# Patient Record
Sex: MALE | Race: BLACK OR AFRICAN AMERICAN
[De-identification: names, ages, dates, MRNs, and addresses within clinical notes are randomized per-mention and may not be internally consistent; named-entity substitution may affect disease eponyms.]

---

## 2017-04-24 ENCOUNTER — HOSPITAL ENCOUNTER (EMERGENCY)
Dept: HOSPITAL 62 - ER | Age: 82
Discharge: HOME | End: 2017-04-24
Payer: MEDICARE

## 2017-04-24 VITALS — DIASTOLIC BLOOD PRESSURE: 104 MMHG | SYSTOLIC BLOOD PRESSURE: 174 MMHG

## 2017-04-24 DIAGNOSIS — R03.0: ICD-10-CM

## 2017-04-24 DIAGNOSIS — J43.9: ICD-10-CM

## 2017-04-24 DIAGNOSIS — F41.9: ICD-10-CM

## 2017-04-24 DIAGNOSIS — Z86.711: ICD-10-CM

## 2017-04-24 DIAGNOSIS — Z96.642: ICD-10-CM

## 2017-04-24 DIAGNOSIS — I10: ICD-10-CM

## 2017-04-24 DIAGNOSIS — R07.9: Primary | ICD-10-CM

## 2017-04-24 DIAGNOSIS — Z87.891: ICD-10-CM

## 2017-04-24 LAB
ALBUMIN SERPL-MCNC: 4.6 G/DL (ref 3.5–5)
ALP SERPL-CCNC: 122 U/L (ref 38–126)
ALT SERPL-CCNC: 18 U/L (ref 21–72)
ANION GAP SERPL CALC-SCNC: 14 MMOL/L (ref 5–19)
AST SERPL-CCNC: 59 U/L (ref 17–59)
BASOPHILS # BLD AUTO: 0 10^3/UL (ref 0–0.2)
BASOPHILS NFR BLD AUTO: 0.3 % (ref 0–2)
BILIRUB DIRECT SERPL-MCNC: 0.8 MG/DL (ref 0–0.4)
BILIRUB SERPL-MCNC: 1.3 MG/DL (ref 0.2–1.3)
BUN SERPL-MCNC: 20 MG/DL (ref 7–20)
CALCIUM: 10 MG/DL (ref 8.4–10.2)
CHLORIDE SERPL-SCNC: 107 MMOL/L (ref 98–107)
CK MB SERPL-MCNC: 5.21 NG/ML (ref ?–4.55)
CK SERPL-CCNC: 320 U/L (ref 55–170)
CO2 SERPL-SCNC: 25 MMOL/L (ref 22–30)
CREAT SERPL-MCNC: 1.51 MG/DL (ref 0.52–1.25)
EOSINOPHIL # BLD AUTO: 0.1 10^3/UL (ref 0–0.6)
EOSINOPHIL NFR BLD AUTO: 1.2 % (ref 0–6)
ERYTHROCYTE [DISTWIDTH] IN BLOOD BY AUTOMATED COUNT: 15 % (ref 11.5–14)
GLUCOSE SERPL-MCNC: 103 MG/DL (ref 75–110)
HCT VFR BLD CALC: 43.9 % (ref 37.9–51)
HGB BLD-MCNC: 14.6 G/DL (ref 13.5–17)
HGB HCT DIFFERENCE: -0.1
LYMPHOCYTES # BLD AUTO: 1 10^3/UL (ref 0.5–4.7)
LYMPHOCYTES NFR BLD AUTO: 16.6 % (ref 13–45)
MCH RBC QN AUTO: 28.2 PG (ref 27–33.4)
MCHC RBC AUTO-ENTMCNC: 33.3 G/DL (ref 32–36)
MCV RBC AUTO: 85 FL (ref 80–97)
MONOCYTES # BLD AUTO: 0.4 10^3/UL (ref 0.1–1.4)
MONOCYTES NFR BLD AUTO: 7 % (ref 3–13)
NEUTROPHILS # BLD AUTO: 4.3 10^3/UL (ref 1.7–8.2)
NEUTS SEG NFR BLD AUTO: 74.9 % (ref 42–78)
POTASSIUM SERPL-SCNC: 4.3 MMOL/L (ref 3.6–5)
PROT SERPL-MCNC: 9.5 G/DL (ref 6.3–8.2)
RBC # BLD AUTO: 5.19 10^6/UL (ref 4.35–5.55)
SODIUM SERPL-SCNC: 146.3 MMOL/L (ref 137–145)
TROPONIN I SERPL-MCNC: < 0.012 NG/ML
WBC # BLD AUTO: 5.7 10^3/UL (ref 4–10.5)

## 2017-04-24 PROCEDURE — 99285 EMERGENCY DEPT VISIT HI MDM: CPT

## 2017-04-24 PROCEDURE — 84484 ASSAY OF TROPONIN QUANT: CPT

## 2017-04-24 PROCEDURE — 71010: CPT

## 2017-04-24 PROCEDURE — 82550 ASSAY OF CK (CPK): CPT

## 2017-04-24 PROCEDURE — 36415 COLL VENOUS BLD VENIPUNCTURE: CPT

## 2017-04-24 PROCEDURE — 71275 CT ANGIOGRAPHY CHEST: CPT

## 2017-04-24 PROCEDURE — 80053 COMPREHEN METABOLIC PANEL: CPT

## 2017-04-24 PROCEDURE — 85025 COMPLETE CBC W/AUTO DIFF WBC: CPT

## 2017-04-24 PROCEDURE — 93005 ELECTROCARDIOGRAM TRACING: CPT

## 2017-04-24 PROCEDURE — 85379 FIBRIN DEGRADATION QUANT: CPT

## 2017-04-24 PROCEDURE — 93010 ELECTROCARDIOGRAM REPORT: CPT

## 2017-04-24 PROCEDURE — 82553 CREATINE MB FRACTION: CPT

## 2017-04-24 NOTE — EKG REPORT
SEVERITY:- BORDERLINE ECG -

SINUS RHYTHM

BORDERLINE PROLONGED QT INTERVAL

:

Confirmed by: Caitlyn Sullivan 24-Apr-2017 21:39:11

## 2017-04-24 NOTE — ER DOCUMENT REPORT
ED General





- General


Chief Complaint: Chest Pain


Stated Complaint: CHEST PAIN


Mode of Arrival: Medic


Information source: Patient, Emergency Med Personnel


Notes: 


Patient presents to the emergency department via EMS for complaints of burning 

chest pain and high blood pressure.  Patient reports that around Easter his 

fingertips started going numb.  He also reported he had some chest pain felt 

more like a burn that went from his chest to his arm to his stomach.  He went 

to see his primary care provider Dr. Marian osborn and she put him on some pills. 

(naproxen per [facility] record) He reports today was his recheck.  While he was at 

the clinic today he told them he had a burning in his chest and they called 

911.  Patient reports history of high blood pressure, pulmonary embolism noted 

in the record.  Patient also reports he had a hip replacement, surgery on his 

feet and back surgery 2.  Patient denies symptoms at this time.  He denies 

chest burn. Denies SOB.  He denies fever vomiting diarrhea.  Patient has 

history of cigarette smoking but quit in 1994.  He also reports he  quit 

drinking alcohol a long time ago and has never done recreational drugs.


TRAVEL OUTSIDE OF THE [place] IN LAST 30 DAYS: No





- HPI


Onset: Other - Since Easter


Onset/Duration: Waxing and waning


Quality of pain: No pain, Burning


Associated symptoms: None


Exacerbated by: Denies


Relieved by: Denies


Similar symptoms previously: Yes


Recently seen / treated by doctor: Yes





- Related Data


Allergies/Adverse Reactions: 


 





No Known Allergies Allergy (Verified 04/24/17 13:02)


 











Past Medical History





- General


Information source: Patient





- Social History


Smoking Status: Former Smoker - quit in 94


Cigarette use (# per day): No


Frequency of alcohol use: None


Drug Abuse: None


Lives with: Alone


Family History: Reviewed & Not Pertinent


Patient has suicidal ideation: No


Patient has homicidal ideation: No





- Past Medical History


Cardiac Medical History: Reports: Hx Hypertension, Hx Pulmonary Embolism


   Denies: Hx Coronary Artery Disease, Hx Heart Attack


Pulmonary Medical History: 


   Denies: Hx Asthma, Hx Bronchitis, Hx COPD, Hx Pneumonia, Hx Tuberculosis


Neurological Medical History: Denies: Hx Cerebrovascular Accident, Hx Seizures


Endocrine Medical History: Denies: Hx Diabetes Mellitus Type 1, Hx Diabetes 

Mellitus Type 2, Hx Graves' Disease, Hx Hyperthyroidism, Hx Hypothyroidism


Renal/ Medical History: Denies: Hx Peritoneal Dialysis


GI Medical History: Denies: Hx Hepatitis


Musculoskeltal Medical History: Reports Hx Arthritis, Denies Hx Fibromyalgia, 

Denies Hx Multiple Sclerosis, Denies Hx Muscular Dystrophy, Denies Hx Muscle 

Spasm, Denies Hx Muscle Weakness, Denies Hx Musculoskeletal Deformity, Denies 

Hx Musculoskeletal Trauma


Skin Medical History: Denies Hx Cellulitis, Denies Hx Eczema, Denies Hx MRSA, 

Denies Hx Psoriasis


Psychiatric Medical History: Reports: Hx Anxiety


   Denies: Hx Attention Deficit Hyperactivity Disorder, Hx Bipolar Disorder, Hx 

Borderline Personality Disorder, Hx Depression, Hx Obsessive Compulsive Disorder

, Hx Personality Disorder, Hx Schizoaffective Disorder, Hx Schizophrenia


Traumatic Medical History: Denies: Hx Fractures, Hx Gunshot Wound, Hx Liver 

Laceration, Hx Pneumothorax, Hx Spine Fracture, Hx Spleen Laceration/Rupture, 

Hx Traumatic Brain Injury


Infectious Medical History: Denies: Hx C-Diff, Hx Hepatitis, Hx HIV, Hx MRSA, 

Hx VRE


Past Surgical History: Reports: Hx Appendectomy, Hx Orthopedic Surgery - foot; 

left hip arthroplasty.  Denies: Hx Pacemaker





- Immunizations


Immunizations up to date: Yes


Hx Diphtheria, Pertussis, Tetanus Vaccination: Yes





Review of Systems





- Review of Systems


Notes: 


Review HPI for review of systems., All other systems negative





Physical Exam





- Vital signs


Vitals: 


 











Temp Pulse Resp BP Pulse Ox


 


 95.4 F L  86   14   186/106 H  100 


 


 04/24/17 12:45  04/24/17 12:45  04/24/17 12:45  04/24/17 12:45  04/24/17 12:45














- Notes


Notes: 


PHYSICAL EXAMINATION: 


GENERAL: Well-appearing and in no acute distress nontoxic looking


HEAD: Atraumatic, normocephalic. 


EYES: Pupils round and reactive to light- unequal right smaller than left, 

extraocular movements intact, sclera anicteric, conjunctiva are normal. 


ENT: nares patent, oropharynx clear without exudates. Moist mucous membranes. 


NECK: Normal range of motion, supple without lymphadenopathy 


LUNGS: CTAB and equal. No wheezes rales or rhonchi. no SOB, Speaks in clear 

sentences 


HEART: Regular rate and rhythm without murmurs EKG SR


BACK: Denies pain


ABDOMEN: Soft, no tenderness. No guarding, no rebound 


EXTREMITIES: Normal range of motion, no pitting edema. No cyanosis. no weakness


NEUROLOGICAL: Cranial nerves grossly intact. Normal sensory/motor exams. 


PSYCH: Normal mood, normal affect. alert and oriented, no confusion


SKIN: Warm, Dry, normal turgor, no rashes or lesions noted








- HEENT


Head: Normocephalic


Eyes: Normal


-: right: Pupils uneven - right 2-1 mm, left 3-2





- Neurological


Neuro grossly intact: Yes


Cognition: Normal


Orientation: AAOx4


Maliha Coma Scale Eye Opening: Spontaneous


Linden Coma Scale Verbal: Oriented


Linden Coma Scale Motor: Obeys Commands


Maliha Coma Scale Total: 15


Speech: Normal


Cranial nerves: Normal


Motor strength normal: LUE, RUE


Additional motor exam normals: Equal 





Course





- Re-evaluation


Re-evalutation: 


04/24/17 14:50


consulted dr abercrombie who agree's with cta. pt updated on cta.





04/24/17 17:13


CTA notes emphysema. ,  CKMB 5.21 troponin neg. patient with extremely 

high blood pressure.  Discussed this with the patient and he reports he only 

took one blood pressure medication today.  He couldn't find the other 

medication. I contacted [facility] and pharmacy reports 

patient is out of his Norvasc 10 mg.  Norvasc ordered.


 


04/24/17 17:57


Second troponin negative, pt reports he feels great, he has denied cp his 

entire visit, reports he had a burn in his shoulder, chest and abdomen that 

comes and goes. Reports hes had it since Easter. Alvaro nausea vomiting 

diaphoresis shortness of breath.  Patient's blood pressure still elevated.  He 

was instructed on big value having his Norvasc medication now.  He was 

instructed on the importance of taking those medications.  Patient reports he 

lives alone but he has family in the area.  He mentions of [place] and lots of 

sisters.  He reports they drove him here.  Consulted dr mosley, labs, CTA, 

pt history, he agrees patient may be discharged





- Vital Signs


Vital signs: 


 











Temp Pulse Resp BP Pulse Ox


 


 98.2 F   86   15   174/104 H  100 


 


 04/24/17 18:09  04/24/17 12:45  04/24/17 18:09  04/24/17 18:09  04/24/17 18:09














- Laboratory


Result Diagrams: 


 04/24/17 13:35





 04/24/17 13:35


Laboratory results interpreted by me: 


 











  04/24/17 04/24/17 04/24/17





  13:35 13:35 13:35


 


RDW  15.0 H  


 


D-Dimer   


 


Sodium   146.3 H 


 


Creatinine   1.51 H 


 


Est GFR ( Amer)   54 L 


 


Est GFR (Non-Af Amer)   45 L 


 


Direct Bilirubin   0.8 H 


 


ALT   18 L 


 


Creatine Kinase   320 H 


 


CK-MB (CK-2)    5.21 H


 


Total Protein   9.5 H 














  04/24/17





  13:35


 


RDW 


 


D-Dimer  1.54 H


 


Sodium 


 


Creatinine 


 


Est GFR ( Amer) 


 


Est GFR (Non-Af Amer) 


 


Direct Bilirubin 


 


ALT 


 


Creatine Kinase 


 


CK-MB (CK-2) 


 


Total Protein 














- Diagnostic Test


Radiology reviewed: Image reviewed, Reports reviewed - ORDER #: [id number] RAD/

CHEST SINGLE VIEW  IMPRESSION: NO ACUTE RADIOGRAPHIC FINDING IN THE CHEST      

         Diagnostic report text    EXAM DESCRIPTION: CTA CHEST   COMPLETED DATE/

TIME: 4/24/2017 3:57 pm   REASON FOR STUDY: 17-chest pain, hx pe   COMPARISON: 

None.   TECHNIQUE: CT scan of the chest performed using helical scanning 

technique with dynamic  intravenous contrast injection. Images reviewed with 

lung, soft tissue and bone windows.  Reconstructed coronal and sagittal MPR 

images reviewed.  Additional 3 dimensional post-processing performed to develop 

Maximal Intensity Projection images ( MIP). All images stored on PACS.  All CT 

scanners at this facility use dose modulation, iterative reconstruction, and/or 

weight  based dosing when appropriate to reduce radiation dose to as low as 

reasonably achievable (ALARA).  [facility]: Dose Right [facility]: CareDose [facility]: Dose Right 

[facility]: Teradose 4D [facility]: [employer]   CONTRAST TYPE AND DOSE: 65 mL Isovue 

370- low osmolar.   RENAL FUNCTION: Creatinine 1.5   RADIATION DOSE: 42.56 mGy.

   LIMITATIONS: None.   FINDINGS: LUNGS AND PLEURA: There are moderate 

centrilobular emphysematous changes.  AORTA AND GREAT VESSELS: No aneurysm or 

dissection.  HEART: No pericardial effusion.  PULMONARY ARTERIES: No emboli 

visualized in the main pulmonary arteries or the segmental branches.  HILAR AND 

MEDIASTINAL STRUCTURES: No identified masses or abnormal nodes.  HARDWARE: None 

in the chest.  UPPER ABDOMEN: No significant findings. Limited exam.  THYROID 

AND OTHER SOFT TISSUES: No masses. No adenopathy.  BONES: No acute or 

significant finding.  3D MIPS: Confirm above findings.  OTHER: No other 

significant finding.   TECHNICAL DOCUMENTATION: JOB ID: [id number]  Quality ID # 

436: Final reports with documentation of one or more dose reduction techniques (

e.g.,  Automated exposure control, adjustment of the mA and/or kV according to 

patient size, use of  iterative reconstruction technique)   2011 [employer]- All Rights Reserved     ORDER #: [id number] CT/CTA CHEST  

IMPRESSION: 1. There is no evidence of pulmonary embolus.  2. There is moderate 

pulmonary emphysema





Discharge





- Discharge


Clinical Impression: 


 Burning chest pain, Elevated blood pressure reading





Pulmonary emphysema


Qualifiers:


 Emphysema type: unspecified Qualified Code(s): J43.9 - Emphysema, unspecified





Condition: Stable


Disposition: HOME, SELF-CARE


Instructions:  Aspirin (Cardiac) ([facility])


Additional Instructions: 


*You have been evaluated for high blood pressure, burn in chest, shoulder, 

abdomen.


*Take your  medication as prescribed-  your norvasc tomorrow from the 

[facility] pharmacy


*Follow up with  Marian Osborn within 3 days for recheck


*Return to ED for worsening condition, changes, needs


*Return to ED if not better in 24 hours





Forms:  Elevated Blood Pressure


Referrals: 


MARIAN OSBORN PA-C [Primary Care Provider] - Follow up as needed

## 2017-05-10 ENCOUNTER — HOSPITAL ENCOUNTER (OUTPATIENT)
Dept: HOSPITAL 62 - OD | Age: 82
End: 2017-05-10
Attending: PHYSICIAN ASSISTANT
Payer: MEDICARE

## 2017-05-10 DIAGNOSIS — M54.2: Primary | ICD-10-CM

## 2017-05-10 PROCEDURE — 72050 X-RAY EXAM NECK SPINE 4/5VWS: CPT

## 2017-05-27 ENCOUNTER — HOSPITAL ENCOUNTER (EMERGENCY)
Dept: HOSPITAL 62 - ER | Age: 82
Discharge: HOME | End: 2017-05-27
Payer: MEDICARE

## 2017-05-27 VITALS — SYSTOLIC BLOOD PRESSURE: 164 MMHG | DIASTOLIC BLOOD PRESSURE: 91 MMHG

## 2017-05-27 DIAGNOSIS — R53.1: ICD-10-CM

## 2017-05-27 DIAGNOSIS — I10: ICD-10-CM
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
## 2017-05-27 DIAGNOSIS — M54.2: ICD-10-CM

## 2017-05-27 DIAGNOSIS — R27.0: Primary | ICD-10-CM

## 2017-05-27 LAB
ALBUMIN SERPL-MCNC: 4.1 G/DL (ref 3.5–5)
ALP SERPL-CCNC: 88 U/L (ref 38–126)
ALT SERPL-CCNC: 21 U/L (ref 21–72)
ANION GAP SERPL CALC-SCNC: 10 MMOL/L (ref 5–19)
APPEARANCE UR: CLEAR
AST SERPL-CCNC: 36 U/L (ref 17–59)
BASOPHILS # BLD AUTO: 0 10^3/UL (ref 0–0.2)
BASOPHILS NFR BLD AUTO: 0.4 % (ref 0–2)
BILIRUB DIRECT SERPL-MCNC: 0.7 MG/DL (ref 0–0.4)
BILIRUB SERPL-MCNC: 1.1 MG/DL (ref 0.2–1.3)
BILIRUB UR QL STRIP: NEGATIVE
BUN SERPL-MCNC: 25 MG/DL (ref 7–20)
CALCIUM: 9.7 MG/DL (ref 8.4–10.2)
CHLORIDE SERPL-SCNC: 105 MMOL/L (ref 98–107)
CK MB SERPL-MCNC: 1.74 NG/ML (ref ?–4.55)
CK SERPL-CCNC: 116 U/L (ref 55–170)
CO2 SERPL-SCNC: 26 MMOL/L (ref 22–30)
CREAT SERPL-MCNC: 1.53 MG/DL (ref 0.52–1.25)
EOSINOPHIL # BLD AUTO: 0.1 10^3/UL (ref 0–0.6)
EOSINOPHIL NFR BLD AUTO: 1.9 % (ref 0–6)
ERYTHROCYTE [DISTWIDTH] IN BLOOD BY AUTOMATED COUNT: 14.7 % (ref 11.5–14)
GLUCOSE SERPL-MCNC: 95 MG/DL (ref 75–110)
GLUCOSE UR STRIP-MCNC: NEGATIVE MG/DL
HCT VFR BLD CALC: 42.1 % (ref 37.9–51)
HGB BLD-MCNC: 13.6 G/DL (ref 13.5–17)
HGB HCT DIFFERENCE: -1.3
KETONES UR STRIP-MCNC: NEGATIVE MG/DL
LYMPHOCYTES # BLD AUTO: 1.2 10^3/UL (ref 0.5–4.7)
LYMPHOCYTES NFR BLD AUTO: 20.6 % (ref 13–45)
MCH RBC QN AUTO: 27.9 PG (ref 27–33.4)
MCHC RBC AUTO-ENTMCNC: 32.3 G/DL (ref 32–36)
MCV RBC AUTO: 86 FL (ref 80–97)
MONOCYTES # BLD AUTO: 0.7 10^3/UL (ref 0.1–1.4)
MONOCYTES NFR BLD AUTO: 11.5 % (ref 3–13)
NEUTROPHILS # BLD AUTO: 3.9 10^3/UL (ref 1.7–8.2)
NEUTS SEG NFR BLD AUTO: 65.6 % (ref 42–78)
NITRITE UR QL STRIP: NEGATIVE
PH UR STRIP: 5 [PH] (ref 5–9)
POTASSIUM SERPL-SCNC: 3.9 MMOL/L (ref 3.6–5)
PROT SERPL-MCNC: 8.7 G/DL (ref 6.3–8.2)
PROT UR STRIP-MCNC: NEGATIVE MG/DL
RBC # BLD AUTO: 4.89 10^6/UL (ref 4.35–5.55)
SODIUM SERPL-SCNC: 141.3 MMOL/L (ref 137–145)
SP GR UR STRIP: 1.02
TROPONIN I SERPL-MCNC: < 0.012 NG/ML
UROBILINOGEN UR-MCNC: NEGATIVE MG/DL (ref ?–2)
WBC # BLD AUTO: 5.9 10^3/UL (ref 4–10.5)

## 2017-05-27 PROCEDURE — 82553 CREATINE MB FRACTION: CPT

## 2017-05-27 PROCEDURE — 99285 EMERGENCY DEPT VISIT HI MDM: CPT

## 2017-05-27 PROCEDURE — 71010: CPT

## 2017-05-27 PROCEDURE — 81001 URINALYSIS AUTO W/SCOPE: CPT

## 2017-05-27 PROCEDURE — 36415 COLL VENOUS BLD VENIPUNCTURE: CPT

## 2017-05-27 PROCEDURE — 82550 ASSAY OF CK (CPK): CPT

## 2017-05-27 PROCEDURE — 93005 ELECTROCARDIOGRAM TRACING: CPT

## 2017-05-27 PROCEDURE — 70450 CT HEAD/BRAIN W/O DYE: CPT

## 2017-05-27 PROCEDURE — 80053 COMPREHEN METABOLIC PANEL: CPT

## 2017-05-27 PROCEDURE — 93010 ELECTROCARDIOGRAM REPORT: CPT

## 2017-05-27 PROCEDURE — 72125 CT NECK SPINE W/O DYE: CPT

## 2017-05-27 PROCEDURE — 85025 COMPLETE CBC W/AUTO DIFF WBC: CPT

## 2017-05-27 PROCEDURE — 84484 ASSAY OF TROPONIN QUANT: CPT

## 2017-05-27 NOTE — RADIOLOGY REPORT (SQ)
EXAM DESCRIPTION:  CT LT LOWER EXTREMITY WITHOUT



COMPLETED DATE/TIME:  5/27/2017 6:25 am



REASON FOR STUDY:  left femur, trauma



COMPARISON:  Plain films dated 6/27/2017



TECHNIQUE:  Axial imaging performed through the left femur with reformatted coronal and sagittal imag
ing windowed for bone and soft tissues.

Images saved to PACS.

3D IMAGING: Were 3D images as MIP, SSD, or volume rendering performed at the work station? Yes

All CT scanners at this facility use dose modulation, iterative reconstruction, and/or weight based d
osing when appropriate to reduce radiation dose to as low as reasonably achievable (ALARA).

[facility]: Dose Right  [facility]: CareDose    [facility]: Dose Right    [facility]: Teradose 4D    [facility]: [employer]



LIMITATIONS:  Study is limited somewhat due to artifact related to the left hip prosthesis



RADIATION DOSE:  4.12 mGy.



FINDINGS:  SOFT TISSUES: No obvious swelling or foreign body.

BONES: No acute fracture.  No dislocation.

MINERALIZATION: Normal.

OTHER: Left hip prosthesis is identified well seated in the acetabulum and proximal femoral medullary
 canal.



IMPRESSION:  NO ACUTE OR SIGNIFICANT FINDING.



TECHNICAL DOCUMENTATION:  JOB ID:  [id number]

Quality ID # 436: Final reports with documentation of one or more dose reduction techniques (e.g., Au
tomated exposure control, adjustment of the mA and/or kV according to patient size, use of iterative 
reconstruction technique)

 2011 [employer]- All Rights Reserved

## 2017-05-27 NOTE — RADIOLOGY REPORT (SQ)
EXAM DESCRIPTION:  CHEST SINGLE VIEW



COMPLETED DATE/TIME:  5/27/2017 4:08 am



REASON FOR STUDY:  trauma, weakness



COMPARISON:  9/26/2015



EXAM PARAMETERS:  NUMBER OF VIEWS: One view.

TECHNIQUE: Single frontal radiographic view of the chest acquired.

RADIATION DOSE: NA

LIMITATIONS: None.



FINDINGS:  LUNGS AND PLEURA: No opacities, masses or pneumothorax. No pleural effusion.

MEDIASTINUM AND HILAR STRUCTURES: No masses.  Contour normal.

HEART AND VASCULAR STRUCTURES: Heart normal in size.  Normal vasculature.

BONES: No acute findings.

HARDWARE: None in the chest.

OTHER: No other significant finding.



IMPRESSION:  NO ACUTE RADIOGRAPHIC FINDING IN THE CHEST.



TECHNICAL DOCUMENTATION:  JOB ID:  [id number]

## 2017-05-27 NOTE — RADIOLOGY REPORT (SQ)
EXAM DESCRIPTION:  HIP LEFT AP/LATERAL



COMPLETED DATE/TIME:  5/27/2017 4:08 am



REASON FOR STUDY:  trauma



COMPARISON:  1/19/2015



NUMBER OF VIEWS:  Two views.



TECHNIQUE:  AP pelvis and additional frog-leg view of the left hip.



LIMITATIONS:  None.



FINDINGS:  MINERALIZATION: Normal.

LEFT HIP: Status post left total hip arthroplasty.  There is no hardware fracture, perihardware lucen
cy or migration evident.

RIGHT HIP: No fracture or dislocation.  No worrisome bone lesions.  Femoral acetabular degenerative c
hanges

PUBIS AND ISCHIUM: No fracture.

PELVIS: No fracture.

SACRUM: No fracture or dislocation. No worrisome bone lesions.

LOWER LUMBAR SPINE: No fracture or dislocation. No worrisome bone lesions.  No significant disc disea
se.

SOFT TISSUES: No findings.

OTHER: On the frog-leg projection only, there is a linear opacity along the medial femoral cortex ; t
his is of uncertain etiology or significance.



IMPRESSION:  Status post left total hip arthroplasty without evidence of hardware complication.  On t
he frog-leg projection only, there is of uncertain linear opacity.  Given history of trauma cannot ex
clude osseous injury.  Recommend correlation with physical exam and mechanism of injury.  Consider re
peat left femoral radiographs.



TECHNICAL DOCUMENTATION:  JOB ID:  [id number]

 2011 [employer]- All Rights Reserved

## 2017-05-27 NOTE — RADIOLOGY REPORT (SQ)
EXAM DESCRIPTION:  CT CERVICAL SPINE WITHOUT



COMPLETED DATE/TIME:  5/27/2017 7:06 am



REASON FOR STUDY:  neck pain



COMPARISON:  None.



TECHNIQUE:  Axial images acquired through the cervical spine without intravenous contrast.  Images re
viewed with lung, soft tissue and bone windows.  Reconstructed coronal and sagittal MPR images review
ed.  Images stored on PACS.

All CT scanners at this facility use dose modulation, iterative reconstruction, and/or weight based d
osing when appropriate to reduce radiation dose to as low as reasonably achievable (ALARA).

[facility]: Dose Right  [facility]: CareDose    [facility]: Dose Right    [facility]: Teradose 4D    [facility]: [employer]



RADIATION DOSE:  17.07 mGy.



LIMITATIONS:  None.



FINDINGS:  ALIGNMENT: Anatomic.

MINERALIZATION: Normal.

VERTEBRAL BODIES: No fractures or dislocation.

DISCS: Multilevel disc space narrowing with osteophytes.

FACETS, LATERAL MASSES, POSTERIOR ELEMENTS: Facet arthropathy.  No fractures.  No dislocation.  No ac
[place] findings.

HARDWARE: None in the spine.

VISUALIZED RIBS: No fractures.

LUNG APICES AND SOFT TISSUES: No significant or acute findings.

OTHER: No other significant finding.



IMPRESSION:  CHRONIC DEGENERATIVE CHANGES.  NO ACUTE FINDINGS.



TECHNICAL DOCUMENTATION:  JOB ID:  [id number]

Quality ID # 436: Final reports with documentation of one or more dose reduction techniques (e.g., Au
tomated exposure control, adjustment of the mA and/or kV according to patient size, use of iterative 
reconstruction technique)

 2011 [employer]- All Rights Reserved

## 2017-05-27 NOTE — RADIOLOGY REPORT (SQ)
EXAM DESCRIPTION:  CT HEAD WITHOUT



COMPLETED DATE/TIME:  5/27/2017 3:52 am



REASON FOR STUDY:  trauma, weakness



COMPARISON:  None.



TECHNIQUE:  Axial images acquired through the brain without intravenous contrast.  Images reviewed wi
th bone, brain and subdural windows.  Images stored on PACS.

All CT scanners at this facility use dose modulation, iterative reconstruction, and/or weight based d
osing when appropriate to reduce radiation dose to as low as reasonably achievable (ALARA).

[facility]: Dose Right  [facility]: CareDose    [facility]: Dose Right    [facility]: Teradose 4D    [facility]: [employer]



RADIATION DOSE:  64.61 mGy.



LIMITATIONS:  None.



FINDINGS:  VENTRICLES: Mildly prominent.

CEREBRUM: No masses.  No hemorrhage.  No midline shift.  Areas of low density in the white matter mos
t likely due to chronic micro-vascular ischemic change.  No evidence for acute infarction.

CEREBELLUM: No masses.  No hemorrhage.  No alteration of density.  No evidence for acute infarction.

EXTRAAXIAL SPACES: Mild age-related involutional change.  No fluid collections.  No masses.

ORBITS AND GLOBE: No intra- or extraconal masses.  Normal contour of globe without masses.

CALVARIUM: No fracture.

PARANASAL SINUSES: No fluid or mucosal thickening.

SOFT TISSUES: No mass or hematoma.  Incidental note is made of atherosclerotic vascular calcification
s within the cavernous segments of the internal carotid arteries.

OTHER: No other significant finding.



IMPRESSION:  MILD CHRONIC CHANGES OF ATROPHY AND MICROVASCULAR ISCHEMIA.  NO ACUTE PROCESS.



TECHNICAL DOCUMENTATION:  JOB ID:  [id number]

Quality ID # 436: Final reports with documentation of one or more dose reduction techniques (e.g., Au
tomated exposure control, adjustment of the mA and/or kV according to patient size, use of iterative 
reconstruction technique)

 2011 [employer]- All Rights Reserved

## 2017-05-27 NOTE — EKG REPORT
SEVERITY:- ABNORMAL ECG -

SINUS RHYTHM

NONSPECIFIC LATERAL ST-T CHANGES

:

Confirmed by: Collins Morales MD 27-May-2017 08:23:01

## 2017-06-04 ENCOUNTER — HOSPITAL ENCOUNTER (EMERGENCY)
Dept: HOSPITAL 62 - ER | Age: 82
Discharge: HOME | End: 2017-06-04
Payer: MEDICARE

## 2017-06-04 VITALS — DIASTOLIC BLOOD PRESSURE: 73 MMHG | SYSTOLIC BLOOD PRESSURE: 128 MMHG

## 2017-06-04 DIAGNOSIS — R20.0: ICD-10-CM

## 2017-06-04 DIAGNOSIS — Z86.711: ICD-10-CM

## 2017-06-04 DIAGNOSIS — I10: ICD-10-CM

## 2017-06-04 DIAGNOSIS — R53.1: Primary | ICD-10-CM

## 2017-06-04 DIAGNOSIS — Z87.891: ICD-10-CM

## 2017-06-04 LAB
ANION GAP SERPL CALC-SCNC: 10 MMOL/L (ref 5–19)
APPEARANCE UR: CLEAR
BASOPHILS # BLD AUTO: 0 10^3/UL (ref 0–0.2)
BASOPHILS NFR BLD AUTO: 0.6 % (ref 0–2)
BILIRUB UR QL STRIP: NEGATIVE
BUN SERPL-MCNC: 20 MG/DL (ref 7–20)
CALCIUM: 9.6 MG/DL (ref 8.4–10.2)
CHLORIDE SERPL-SCNC: 107 MMOL/L (ref 98–107)
CO2 SERPL-SCNC: 26 MMOL/L (ref 22–30)
CREAT SERPL-MCNC: 1.42 MG/DL (ref 0.52–1.25)
EOSINOPHIL # BLD AUTO: 0.1 10^3/UL (ref 0–0.6)
EOSINOPHIL NFR BLD AUTO: 1.4 % (ref 0–6)
ERYTHROCYTE [DISTWIDTH] IN BLOOD BY AUTOMATED COUNT: 14.4 % (ref 11.5–14)
GLUCOSE SERPL-MCNC: 97 MG/DL (ref 75–110)
GLUCOSE UR STRIP-MCNC: NEGATIVE MG/DL
HCT VFR BLD CALC: 38.8 % (ref 37.9–51)
HGB BLD-MCNC: 12.7 G/DL (ref 13.5–17)
HGB HCT DIFFERENCE: -0.7
KETONES UR STRIP-MCNC: NEGATIVE MG/DL
LIPASE SERPL-CCNC: 172.8 U/L (ref 23–300)
LYMPHOCYTES # BLD AUTO: 0.7 10^3/UL (ref 0.5–4.7)
LYMPHOCYTES NFR BLD AUTO: 10.5 % (ref 13–45)
MAGNESIUM SERPL-MCNC: 1.9 MG/DL (ref 1.6–2.3)
MCH RBC QN AUTO: 28.2 PG (ref 27–33.4)
MCHC RBC AUTO-ENTMCNC: 32.8 G/DL (ref 32–36)
MCV RBC AUTO: 86 FL (ref 80–97)
MONOCYTES # BLD AUTO: 0.4 10^3/UL (ref 0.1–1.4)
MONOCYTES NFR BLD AUTO: 6.3 % (ref 3–13)
NEUTROPHILS # BLD AUTO: 5.1 10^3/UL (ref 1.7–8.2)
NEUTS SEG NFR BLD AUTO: 81.2 % (ref 42–78)
NITRITE UR QL STRIP: NEGATIVE
PH UR STRIP: 6 [PH] (ref 5–9)
POTASSIUM SERPL-SCNC: 4 MMOL/L (ref 3.6–5)
PROT UR STRIP-MCNC: 30 MG/DL
RBC # BLD AUTO: 4.51 10^6/UL (ref 4.35–5.55)
SODIUM SERPL-SCNC: 143.2 MMOL/L (ref 137–145)
SP GR UR STRIP: 1.01
UROBILINOGEN UR-MCNC: NEGATIVE MG/DL (ref ?–2)
WBC # BLD AUTO: 6.3 10^3/UL (ref 4–10.5)

## 2017-06-04 PROCEDURE — 99285 EMERGENCY DEPT VISIT HI MDM: CPT

## 2017-06-04 PROCEDURE — 85025 COMPLETE CBC W/AUTO DIFF WBC: CPT

## 2017-06-04 PROCEDURE — 84484 ASSAY OF TROPONIN QUANT: CPT

## 2017-06-04 PROCEDURE — 83690 ASSAY OF LIPASE: CPT

## 2017-06-04 PROCEDURE — 81001 URINALYSIS AUTO W/SCOPE: CPT

## 2017-06-04 PROCEDURE — 80048 BASIC METABOLIC PNL TOTAL CA: CPT

## 2017-06-04 PROCEDURE — 70450 CT HEAD/BRAIN W/O DYE: CPT

## 2017-06-04 PROCEDURE — 93010 ELECTROCARDIOGRAM REPORT: CPT

## 2017-06-04 PROCEDURE — 71020: CPT

## 2017-06-04 PROCEDURE — 93005 ELECTROCARDIOGRAM TRACING: CPT

## 2017-06-04 PROCEDURE — 36415 COLL VENOUS BLD VENIPUNCTURE: CPT

## 2017-06-04 PROCEDURE — 83735 ASSAY OF MAGNESIUM: CPT

## 2017-06-04 NOTE — RADIOLOGY REPORT (SQ)
EXAM DESCRIPTION:  CHEST PA/LAT



COMPLETED DATE/TIME:  6/4/2017 8:52 am



REASON FOR STUDY:  WEAKNESS



COMPARISON:  CT angio chest 4/24/2017

AP chest 4/24/2017, 5/27/2017



EXAM PARAMETERS:  NUMBER OF VIEWS: two views

TECHNIQUE: Digital Frontal and Lateral radiographic views of the chest acquired.

RADIATION DOSE: NA

LIMITATIONS: none



FINDINGS:  LUNGS AND PLEURA: No opacities, masses or pneumothorax. No pleural effusion.

MEDIASTINUM AND HILAR STRUCTURES: No masses or contour abnormalities.

HEART AND VASCULAR STRUCTURES: Heart normal size.  No evidence for failure.

BONES: No acute findings.

HARDWARE: None in the chest.

OTHER: No other significant finding.



IMPRESSION:  NO SIGNIFICANT RADIOGRAPHIC FINDING IN THE CHEST.



TECHNICAL DOCUMENTATION:  JOB ID:  [id number]

 2011 [employer]- All Rights Reserved

## 2017-06-04 NOTE — ER DOCUMENT REPORT
ED General





- General


Chief Complaint: General Weakness


Stated Complaint: FALL/WEAKNESS


Time Seen by Provider: 06/04/17 07:43


TRAVEL OUTSIDE OF THE [place] IN LAST 30 DAYS: No





- HPI


Patient complains to provider of: Generalized weakness


Notes: 


Patient is coming in for generalized weakness.  Patient states is an ongoing 

for months and progressively getting worse.  Patient states diffuse numbness in 

hands and extremities is intermittent.  Patient denies fever chills nausea 

vomiting denies any chest pain or abdominal pain.  Patient has generalized 

weakness or unilateral weakness.  Patient has not followed up with his primary 

care physician





- Related Data


Allergies/Adverse Reactions: 


 





No Known Allergies Allergy (Verified 05/27/17 03:31)


 











Past Medical History





- Social History


Smoking Status: Former Smoker


Chew tobacco use (# tins/day): No


Frequency of alcohol use: None


Drug Abuse: None


Family History: Reviewed & Not Pertinent


Patient has suicidal ideation: No


Patient has homicidal ideation: No





- Past Medical History


Cardiac Medical History: Reports: Hx Hypertension, Hx Pulmonary Embolism


   Denies: Hx Coronary Artery Disease, Hx Heart Attack


Pulmonary Medical History: 


   Denies: Hx Asthma, Hx Bronchitis, Hx COPD, Hx Pneumonia, Hx Tuberculosis


Neurological Medical History: Denies: Hx Cerebrovascular Accident, Hx Seizures


Endocrine Medical History: Denies: Hx Diabetes Mellitus Type 1, Hx Diabetes 

Mellitus Type 2, Hx Graves' Disease, Hx Hyperthyroidism, Hx Hypothyroidism


Renal/ Medical History: Denies: Hx Peritoneal Dialysis


GI Medical History: Denies: Hx Hepatitis


Musculoskeltal Medical History: Reports Hx Arthritis, Denies Hx Fibromyalgia, 

Denies Hx Multiple Sclerosis, Denies Hx Muscular Dystrophy, Denies Hx Muscle 

Spasm, Denies Hx Muscle Weakness, Denies Hx Musculoskeletal Deformity, Denies 

Hx Musculoskeletal Trauma


Skin Medical History: Denies Hx Cellulitis, Denies Hx Eczema, Denies Hx MRSA, 

Denies Hx Psoriasis


Psychiatric Medical History: Reports: Hx Anxiety


   Denies: Hx Attention Deficit Hyperactivity Disorder, Hx Bipolar Disorder, Hx 

Borderline Personality Disorder, Hx Depression, Hx Obsessive Compulsive Disorder

, Hx Personality Disorder, Hx Schizoaffective Disorder, Hx Schizophrenia


Traumatic Medical History: Denies: Hx Fractures, Hx Gunshot Wound, Hx Liver 

Laceration, Hx Pneumothorax, Hx Spine Fracture, Hx Spleen Laceration/Rupture, 

Hx Traumatic Brain Injury


Infectious Medical History: Denies: Hx C-Diff, Hx Hepatitis, Hx HIV, Hx MRSA, 

Hx VRE


Past Surgical History: Reports: Hx Appendectomy, Hx Orthopedic Surgery - foot; 

left hip arthroplasty.  Denies: Hx Pacemaker





- Immunizations


Immunizations up to date: Yes


Hx Diphtheria, Pertussis, Tetanus Vaccination: Yes





Review of Systems





- Review of Systems


Constitutional: Weakness


EENT: No symptoms reported


Cardiovascular: No symptoms reported


Respiratory: No symptoms reported


Gastrointestinal: No symptoms reported


Genitourinary: No symptoms reported


Male Genitourinary: No symptoms reported


Musculoskeletal: No symptoms reported


Skin: No symptoms reported


Hematologic/Lymphatic: No symptoms reported


Neurological/Psychological: No symptoms reported


-: Yes All other systems reviewed and negative





Physical Exam





- Vital signs


Vitals: 


 











Temp Pulse Resp BP Pulse Ox


 


 97.4 F   89   20   148/78 H  98 


 


 06/04/17 08:01  06/04/17 08:01  06/04/17 08:01  06/04/17 08:01  06/04/17 08:01











Interpretation: Normal





- General


General appearance: Appears well, Alert





- HEENT


Head: Normocephalic, Atraumatic


Eyes: Normal


Pupils: PERRL





- Respiratory


Respiratory status: No respiratory distress


Chest status: Nontender


Breath sounds: Normal


Chest palpation: Normal





- Cardiovascular


Rhythm: Regular


Heart sounds: Normal auscultation


Murmur: No





- Abdominal


Inspection: Normal


Distension: No distension


Bowel sounds: Normal


Tenderness: Nontender


Organomegaly: No organomegaly





- Back


Back: Normal, Nontender





- Extremities


General upper extremity: Normal inspection, Nontender, Normal color, Normal ROM

, Normal temperature


General lower extremity: Normal inspection, Nontender, Normal color, Normal ROM

, Normal temperature, Normal weight bearing.  No: Madison's sign





- Neurological


Neuro grossly intact: Yes


Cognition: Normal


Orientation: AAOx4


[place] Coma Scale Eye Opening: Spontaneous


[place] Coma Scale Verbal: Oriented


[place] Coma Scale Motor: Obeys Commands


[place] Coma Scale Total: 15


Speech: Normal


Motor strength normal: LUE, RUE, LLE, RLE


Sensory: Normal





- Psychological


Associated symptoms: Normal affect, Normal mood





- Skin


Skin Temperature: Warm


Skin Moisture: Dry


Skin Color: Normal





Course





- Re-evaluation


Re-evalutation: 





06/04/17 14:53


Critical radiology and laboratory studies.  Patient was encouraged follow-up 

primary care physician.  Patient will have a consult for  to 

evaluate the patient to assess for needs





- Vital Signs


Vital signs: 


 











Temp Pulse Resp BP Pulse Ox


 


 97.7 F   83   16   128/73 H  100 


 


 06/04/17 10:46  06/04/17 10:46  06/04/17 10:46  06/04/17 10:46  06/04/17 10:46














- Laboratory


Result Diagrams: 


 06/04/17 09:45





 06/04/17 09:45


Laboratory results interpreted by me: 


 











  06/04/17 06/04/17 06/04/17





  09:45 09:45 10:03


 


Hgb  12.7 L  


 


RDW  14.4 H  


 


Seg Neutrophils %  81.2 H  


 


Lymphocytes %  10.5 L  


 


Creatinine   1.42 H 


 


Est GFR ( Amer)   58 L 


 


Est GFR (Non-Af Amer)   48 L 


 


Urine Protein    30 H














Discharge





- Discharge


Clinical Impression: 


 Generalized weakness





Condition: Good


Disposition: HOME, SELF-CARE


Instructions:  Weakness ([facility])


Additional Instructions: 


Your evaluation today reveals no critical pathology and there is no signs of 

stroke heart attack infection.  We will give her information to our social 

worker who will call you tomorrow to assess your living situation please make 

sure the contact information is up-to-date.


Referrals: 


MARIAN PORRAS PA-C [Primary Care Provider] - Follow up in 3-5 days

## 2017-06-04 NOTE — RADIOLOGY REPORT (SQ)
EXAM DESCRIPTION:  CT HEAD WITHOUT



COMPLETED DATE/TIME:  6/4/2017 8:40 am



REASON FOR STUDY:  WEAKNESS



COMPARISON:  CT brain 5/27/2017, 10/24/2012, 8/19/2012



TECHNIQUE:  Axial images acquired through the brain without intravenous contrast.  Images reviewed wi
th bone, brain and subdural windows.  Images stored on PACS.

All CT scanners at this facility use dose modulation, iterative reconstruction, and/or weight based d
osing when appropriate to reduce radiation dose to as low as reasonably achievable (ALARA).

[facility]: Dose Right  [facility]: CareDose    [facility]: Dose Right    [facility]: Teradose 4D    [facility]: Smart [employer]



RADIATION DOSE:  64.61 mGy.



LIMITATIONS:  None.



FINDINGS:  VENTRICLES: Normal size and contour.

CEREBRUM: No CT evidence of acute intracranial hemorrhage, mass effect or midline shift.  Chronic rica
earing lacunar infarct left thalamus.  Minimal spotty bifrontal and biparietal chronic white matter d
isease.

CEREBELLUM: Suspect a small lacunar infarct in the left angus on axial image 15.  Tiny lacunar infarct
s in the inferior right and left cerebellar hemispheres axial image 12.  No acute hemorrhage, mass ef
fect, or posterior fossa shift.

EXTRAAXIAL SPACES: No fluid collections.  No masses.

ORBITS AND GLOBE: No intra- or extraconal masses.  Normal contour of globe without masses.

CALVARIUM: No fracture.

PARANASAL SINUSES: No fluid or mucosal thickening.

SOFT TISSUES: No mass or hematoma.

OTHER: No other significant finding.



IMPRESSION:  No acute findings.  Small vessel disease as above.



TECHNICAL DOCUMENTATION:  JOB ID:  [id number]

Quality ID # 436: Final reports with documentation of one or more dose reduction techniques (e.g., Au
tomated exposure control, adjustment of the mA and/or kV according to patient size, use of iterative 
reconstruction technique)

 2011 [employer]- All Rights Reserved

## 2017-06-04 NOTE — EKG REPORT
SEVERITY:- BORDERLINE ECG -

SINUS RHYTHM

ATRIAL PREMATURE COMPLEX

LOW VOLTAGE IN FRONTAL LEADS

BORDERLINE T ABNORMALITIES, ANT-LAT LEADS

:

Confirmed by: Caitlyn Sullivan 04-Jun-2017 09:44:01

## 2017-06-07 ENCOUNTER — HOSPITAL ENCOUNTER (OUTPATIENT)
Dept: HOSPITAL 62 - RAD | Age: 82
End: 2017-06-07
Attending: PHYSICIAN ASSISTANT
Payer: MEDICARE

## 2017-06-07 DIAGNOSIS — M50.30: Primary | ICD-10-CM

## 2017-06-07 PROCEDURE — 72141 MRI NECK SPINE W/O DYE: CPT

## 2017-06-07 NOTE — RADIOLOGY REPORT (SQ)
EXAM DESCRIPTION:  MRI CERVICAL SPINE WITHOUT



COMPLETED DATE/TIME:  6/7/2017 10:44 am



REASON FOR STUDY:  DDD, CERVICAL (M50.30) M50.30  OTHER CERVICAL DISC DEGENERATION, UNSP CERVICAL REG
IO



COMPARISON:  None.



TECHNIQUE:  Sagittal and Axial imaging includes T1, T2, STIR and gradient echo sequences.



LIMITATIONS:  Excessive patient motion.



FINDINGS:  ALIGNMENT: Reversal of lordotic curve.

VERTEBRAE: Intact.

BONE MARROW: Normal. No marrow replacement or reactive changes.

DISCS: Desiccation multiple levels.

HARDWARE: None in the spine.

CORD AND BASE OF BRAIN: Increased signal in the lateral margins of the cord at the level of C4.

SOFT TISSUES: No soft tissue masses.

C1-C2: No significant spinal stenosis.

C2-C3: No significant spinal stenosis or exit foraminal stenosis.

C3-C4: Moderate spinal stenosis due to disc osteophyte complex.  Severe neural foraminal narrowing.

C4-C5: Mild spinal stenosis due to disc osteophyte complex.  Severe neural foraminal narrowing bilate
rally.

C5-C6: Similar findings as at C4-5.

C6-C7: Similar findings as at C4- 5 and C5- 6.

C7-T1: Similar findings as at C4- 5, C5- 6 and C6-7.

UPPER THORACIC: Incompletely imaged. No significant spinal stenosis or exit foraminal stenosis.

OTHER: No other significant finding.



IMPRESSION:  Technical limitations due to motion.  Chronic spinal stenosis with mild encephalomalacia
 in the cord at C4.



TECHNICAL DOCUMENTATION:  JOB ID:  [id number]

 2011 [employer]- All Rights Reserved

## 2017-06-27 ENCOUNTER — HOSPITAL ENCOUNTER (EMERGENCY)
Dept: HOSPITAL 62 - ER | Age: 82
LOS: 1 days | Discharge: HOME | End: 2017-06-28
Payer: MEDICARE

## 2017-06-27 DIAGNOSIS — R20.0: Primary | ICD-10-CM

## 2017-06-27 DIAGNOSIS — M54.10: ICD-10-CM

## 2017-06-27 DIAGNOSIS — Z86.711: ICD-10-CM

## 2017-06-27 DIAGNOSIS — I10: ICD-10-CM

## 2017-06-27 PROCEDURE — 99284 EMERGENCY DEPT VISIT MOD MDM: CPT

## 2017-06-27 PROCEDURE — 70450 CT HEAD/BRAIN W/O DYE: CPT

## 2017-06-28 VITALS — DIASTOLIC BLOOD PRESSURE: 83 MMHG | SYSTOLIC BLOOD PRESSURE: 140 MMHG

## 2017-06-28 NOTE — RADIOLOGY REPORT (SQ)
EXAM DESCRIPTION:  CT HEAD WITHOUT



COMPLETED DATE/TIME:  6/28/2017 12:25 am



REASON FOR STUDY:  arm weakness and numbness



COMPARISON:  6.4.17



TECHNIQUE:  Axial images acquired through the brain without intravenous contrast.  Images reviewed wi
th bone, brain and subdural windows.  Images stored on PACS.

All CT scanners at this facility use dose modulation, iterative reconstruction, and/or weight based d
osing when appropriate to reduce radiation dose to as low as reasonably achievable (ALARA).

[facility]: Dose Right  [facility]: CareDose    [facility]: Dose Right    [facility]: Teradose 4D    [facility]: Smart [employer]



RADIATION DOSE:  Up-to-date CT equipment and radiation dose reduction techniques were employed. CTDIv
ol: 64.6 mGy. DLP: 1163 mGy-cm. mGy.



LIMITATIONS:  None.



FINDINGS:  VENTRICLES: Normal size and contour.

CEREBRUM: No masses.  No hemorrhage.  No midline shift.  Normal gray/white matter differentiation.  N
o evidence for acute infarction.

CEREBELLUM: No masses.  No hemorrhage.  No alteration of density.  No evidence for acute infarction.

EXTRAAXIAL SPACES: No fluid collections.  No masses.

ORBITS AND GLOBE: No intra- or extraconal masses.  Normal contour of globe without masses.

CALVARIUM: No fracture.

PARANASAL SINUSES: No fluid or mucosal thickening.

SOFT TISSUES: No mass or hematoma.

OTHER: Moderate diffuse reversed lordotic curvature of the cervical spine, chronic.  Moderate multile
jhony mid and lower cervical disc desiccation.



IMPRESSION:  No acute intracranial findings.



TECHNICAL DOCUMENTATION:  JOB ID:  [id number]

Quality ID # 436: Final reports with documentation of one or more dose reduction techniques (e.g., Au
tomated exposure control, adjustment of the mA and/or kV according to patient size, use of iterative 
reconstruction technique)

 2011 [employer]- All Rights Reserved

## 2017-06-28 NOTE — ER DOCUMENT REPORT
ED General





- General


Chief Complaint: Numbness of Arm


Stated Complaint: NUMBNESS


Time Seen by Provider: 06/28/17 00:01


Notes: 


Patient is a 82-year-old male who presents with complaints of pain in his right 

arm and numbness into his right hand.  Since been ongoing for several months.  

Patient says it has gotten worse in the last several days.  He has seen his 

doctor.  He has been referred to a specialist that he sees on 5 July.  He also 

complains of some difficulty walking.  He does use a walker.  He says this is 

been ongoing for several months as well.  No recent falls or injuries.  No 

recent fevers or infections.  No other complaints at this time.  No other focal 

weakness or numbness.


TRAVEL OUTSIDE OF THE [place] IN LAST 30 DAYS: No





- Related Data


Allergies/Adverse Reactions: 


 





No Known Allergies Allergy (Verified 05/27/17 03:31)


 











Past Medical History





- Social History


Smoking Status: Never Smoker


Frequency of alcohol use: None


Drug Abuse: None


Family History: Reviewed & Not Pertinent





- Past Medical History


Cardiac Medical History: Reports: Hx Hypertension, Hx Pulmonary Embolism


   Denies: Hx Coronary Artery Disease, Hx Heart Attack


Pulmonary Medical History: 


   Denies: Hx Asthma, Hx Bronchitis, Hx COPD, Hx Pneumonia, Hx Tuberculosis


Neurological Medical History: Denies: Hx Cerebrovascular Accident, Hx Seizures


Endocrine Medical History: Denies: Hx Diabetes Mellitus Type 1, Hx Diabetes 

Mellitus Type 2, Hx Graves' Disease, Hx Hyperthyroidism, Hx Hypothyroidism


Renal/ Medical History: Denies: Hx Peritoneal Dialysis


GI Medical History: Denies: Hx Hepatitis


Musculoskeltal Medical History: Reports Hx Arthritis, Denies Hx Fibromyalgia, 

Denies Hx Multiple Sclerosis, Denies Hx Muscular Dystrophy, Denies Hx Muscle 

Spasm, Denies Hx Muscle Weakness, Denies Hx Musculoskeletal Deformity, Denies 

Hx Musculoskeletal Trauma


Skin Medical History: Denies Hx Cellulitis, Denies Hx Eczema, Denies Hx MRSA, 

Denies Hx Psoriasis


Psychiatric Medical History: Reports: Hx Anxiety


   Denies: Hx Attention Deficit Hyperactivity Disorder, Hx Bipolar Disorder, Hx 

Borderline Personality Disorder, Hx Depression, Hx Obsessive Compulsive Disorder

, Hx Personality Disorder, Hx Schizoaffective Disorder, Hx Schizophrenia


Traumatic Medical History: Denies: Hx Fractures, Hx Gunshot Wound, Hx Liver 

Laceration, Hx Pneumothorax, Hx Spine Fracture, Hx Spleen Laceration/Rupture, 

Hx Traumatic Brain Injury


Infectious Medical History: Denies: Hx C-Diff, Hx Hepatitis, Hx HIV, Hx MRSA, 

Hx VRE


Past Surgical History: Reports: Hx Appendectomy, Hx Orthopedic Surgery - foot; 

left hip arthroplasty.  Denies: Hx Pacemaker





- Immunizations


Immunizations up to date: Yes


Hx Diphtheria, Pertussis, Tetanus Vaccination: Yes





Review of Systems





- Review of Systems


Notes: 


My Normal Review Basic





REVIEW OF SYSTEMS:


CONSTITUTIONAL :  Denies fever,  chills, or sweats.  Denies recent illness.


EENT:   Denies eye, ear, throat, or mouth pain or symptoms.  Denies nasal or 

sinus congestion.


RESPIRATORY:  Denies cough, cold, or chest congestion.  Denies shortness of 

breath, difficulty breathing, or wheezing.


GASTROINTESTINAL:  Denies abdominal pain.  Denies nausea, vomiting, or 

diarrhea.  Denies constipation.  Last BM: 


MUSCULOSKELETAL:  Denies neck or back pain or joint pain or swelling.


SKIN:   Denies rash or skin lesions.


NEUROLOGICAL:  Denies altered mental status or loss of consciousness.  Denies 

headache. Weakness and numbness into right arm.  Denies problems with gait or 

speech.  Denies sensory or motor loss.


ALL OTHER SYSTEMS REVIEWED AND NEGATIVE.





Physical Exam





- Vital signs


Vitals: 


 











Temp Pulse Resp BP Pulse Ox


 


 98 F   83   19   141/82 H  100 


 


 06/27/17 23:57  06/27/17 23:57  06/27/17 23:57  06/27/17 23:57  06/27/17 23:57














- Notes


Notes: 


General Appearance: Well nourished, alert, cooperative, no acute distress, mild 

obvious discomfort.


Vitals: reviewed, See vital signs table.


Head: no swelling or tenderness to the head


Eyes: PERRL, EOMI, Conjuctiva clear


Mouth: No decreasd moisture


Neck: Supple, and palpation over the medial trapezius muscle on the right side 

of the neck.


Lungs: No wheezing, No rales, No rhonci, No accessory muscle use, good air 

exchange bilaterally.


Heart: Normal rate, Regular rythm, No murmur, no rub


Extremities: strength 5/5 in all extremities, good pulses in all extremities, 

no swelling or tenderness in the extremities, no edema.


Skin: warm, dry, appropriate color, no rash


Neuro: speech clear, oriented x 3, normal affect, responds appropriately to 

questions.  No nerves II through XII are intact.  Distal sensation intact.  

Some obvious weakness into the right hand.  He can feel me touch all fingers of 

his hand but says that they are tingling.  He has good strength and movement of 

the right shoulder but does have decreased  strength of the right hand 

itself.  Left upper extremity has normal strength.  Patient has very good 

plantar and dorsiflexion of both right and left foot.  Patient is able to 

completely lift his right and left legs off the bed without any difficulty.  

Patient is able to stand and balance himself.  Assistant slightly when he takes 

steps forward me that he is used to using a walker.  Patient is cautious when 

walking but this is to be expected as he typically uses a walker when going 

forward. He is able to get out of bed on his own and get back into the bed on 

his own.





Course





- Vital Signs


Vital signs: 


 











Temp Pulse Resp BP Pulse Ox


 


 98 F   84   18   140/82 H  100 


 


 06/27/17 23:57  06/28/17 00:00  06/28/17 00:00  06/28/17 00:00  06/28/17 00:00














- Transfer of Care


Notes: 





06/28/17 01:15


Patient symptoms seem consistent with his chronic cervical radiculopathy.  I 

did obtain CT scan of just to make sure there is no evidence of any remote 

strokes being that his symptoms have been ongoing for several months.  CT scan 

did not show any evidence of this.   He does walk with a walker.  He says that 

without the walker he does have a hard time walking.  I did encourage him to 

continue use his walker.  At bedside he is able to stand under his own power 

and able to walk forward with little assistance from me.  He denies any recent 

falls.  I encouraged him return to ER if he has worsening of symptoms or has 

any further concerns.  I informed him he must keep his appointment with the 

specialist next week to hopefully get more definitive management his likely 

cervical nerve impingement.





Dictation of this chart was performed using voice recognition software; 

therefore, there may be some unintended grammatical errors.





Discharge





- Discharge


Clinical Impression: 


 Paresthesia, Radiculopathy of arm





Condition: Good


Disposition: HOME, SELF-CARE


Additional Instructions: 


Please follow up with the specialist next week as scheduled. Please return to 

[facility] ER if you have new weakness or numbness affecting your other extremities or 

if you have further concerns. Continue to use your walker at home. Do not 

ambulate without your walker.

## 2017-07-12 ENCOUNTER — HOSPITAL ENCOUNTER (OUTPATIENT)
Dept: HOSPITAL 62 - RAD | Age: 82
End: 2017-07-12
Attending: ORTHOPAEDIC SURGERY
Payer: MEDICARE

## 2017-07-12 DIAGNOSIS — M50.30: Primary | ICD-10-CM

## 2017-07-12 PROCEDURE — 72141 MRI NECK SPINE W/O DYE: CPT

## 2017-07-12 NOTE — RADIOLOGY REPORT (SQ)
EXAM DESCRIPTION:  MRI CERVICAL SPINE WITHOUT



COMPLETED DATE/TIME:  7/12/2017 11:26 am



REASON FOR STUDY:  OTHER CERVICAL DISC DEGENERATION M50.30  OTHER CERVICAL DISC DEGENERATION, UNSP CE
RVICAL REGIO



COMPARISON:  Cervical spine plain films 5/10/2017

CT cervical spine 5/27/2017

MRI cervical spine 6/7/2017



TECHNIQUE:  Sagittal and Axial imaging includes T1, T2, STIR and gradient echo sequences.



LIMITATIONS:  None.



FINDINGS:  ALIGNMENT: Reversal of cervical curvature

VERTEBRAE: Intact.

BONE MARROW: Heterogeneous bone marrow with multilevel fatty degenerative endplate changes from C2 th
rough T1.

DISCS: Diffuse decreased T2 weighted intervertebral disc signal with multilevel disc space loss of he
ight throughout the cervical spine, sparing the C2-3 level

HARDWARE: None in the spine.

CORD AND BASE OF BRAIN: Old lacunar infarct inferior left cerebellar hemisphere.

Within the cervical cord at the level of C4, abnormal intrinsic cord signal is present in the right a
nd left spinal cord gray matter, best shown on axial series 7, image 64.  This is inferior to signifi
cant central stenosis at C3-4.  These changes are likely chronic myelomalacia.

SOFT TISSUES: No soft tissue masses.

C1-C2: No significant spinal stenosis.

C2-C3: Broad diffuse posterior mild disc bulging is present without central stenosis.  High-grade alexandra
ateral foraminal narrowing right greater than left from facet and uncovertebral hypertrophy.

C3-C4: Moderate diffuse posterior disc bulge and bony spurring, bilateral ligamentum flavum thickenin
g and ossification.  There is mild ventral cord flattening, effacement of the CSF around the cervical
 cord and moderate central canal stenosis, best shown on sagittal T2 image 7 and axial series 7, imag
e 55.  High-grade bilateral foraminal stenosis is present.  Just inferior to the C3-4 disc level, the
re is abnormal intrinsic cord signal likely myelomalacia.  This is similar compared to prior MRI 6/7/
2017.

C4-C5: Moderate diffuse posterior disc bulge and bony spurring is present.  Bilateral ligamentum flav
um thickening.  Mild central canal stenosis with effacement of the CSF around the cervical cord but n
o cord flattening.  No abnormal intrinsic cord signal.  High-grade bilateral foraminal stenosis.

C5-C6: Broad diffuse posterior disc bulge and bony spurring, ligamentum flavum thickening/ossificatio
n.  Mild central canal stenosis with effacement of the CSF around the cervical cord but no cord nicholas
ening or abnormal intrinsic cord signal.  High-grade bilateral foraminal narrowing

C6-C7: Minimal central canal stenosis results from broad diffuse posterior disc bulge and bony spurri
ng and mild ligamentum flavum thickening.  Partial effacement of the CSF around the cervical cord wit
hout cord flattening or abnormal intrinsic cord signal.  High-grade bilateral foraminal narrowing.

C7-T1: Mild posterior disc bulging is present.  No central canal stenosis.  High-grade bilateral fora
kana narrowing.

UPPER THORACIC: Moderate bilateral foraminal narrowing at T1 to from facet hypertrophy.  Mild bilater
al foraminal narrowing at T2-3 from facet hypertrophy.

OTHER: No other significant finding.



IMPRESSION:  Significant central canal stenosis at C3-4, C4-5, and C5-6.

Significant multilevel foraminal narrowing.

Abnormal intrinsic cord signal just inferior to the C3-4 disc level, worrisome for myelomalacia



TECHNICAL DOCUMENTATION:  JOB ID:  [id number]

 2011 [employer]- All Rights Reserved

## 2017-09-27 ENCOUNTER — HOSPITAL ENCOUNTER (EMERGENCY)
Dept: HOSPITAL 62 - ER | Age: 82
LOS: 1 days | Discharge: NURSING, REHAB OR LONG-TERM CARE | End: 2017-09-28
Payer: MEDICARE

## 2017-09-27 DIAGNOSIS — M62.838: ICD-10-CM

## 2017-09-27 DIAGNOSIS — J18.1: Primary | ICD-10-CM

## 2017-09-27 DIAGNOSIS — Z87.440: ICD-10-CM

## 2017-09-27 DIAGNOSIS — R07.9: ICD-10-CM

## 2017-09-27 DIAGNOSIS — I26.99: ICD-10-CM

## 2017-09-27 DIAGNOSIS — Z79.01: ICD-10-CM

## 2017-09-27 DIAGNOSIS — R00.0: ICD-10-CM

## 2017-09-27 DIAGNOSIS — R20.8: ICD-10-CM

## 2017-09-27 DIAGNOSIS — Z98.890: ICD-10-CM

## 2017-09-27 DIAGNOSIS — I10: ICD-10-CM

## 2017-09-27 LAB
ALBUMIN SERPL-MCNC: 3.3 G/DL (ref 3.5–5)
ALP SERPL-CCNC: 177 U/L (ref 38–126)
ALT SERPL-CCNC: 83 U/L (ref 21–72)
ANION GAP SERPL CALC-SCNC: 9 MMOL/L (ref 5–19)
APTT BLD: 54.6 SEC (ref 23.5–35.8)
AST SERPL-CCNC: 80 U/L (ref 17–59)
BASOPHILS # BLD AUTO: 0 10^3/UL (ref 0–0.2)
BASOPHILS NFR BLD AUTO: 0.5 % (ref 0–2)
BILIRUB DIRECT SERPL-MCNC: 0.5 MG/DL (ref 0–0.4)
BILIRUB SERPL-MCNC: 0.5 MG/DL (ref 0.2–1.3)
BUN SERPL-MCNC: 20 MG/DL (ref 7–20)
CALCIUM: 9.2 MG/DL (ref 8.4–10.2)
CHLORIDE SERPL-SCNC: 100 MMOL/L (ref 98–107)
CK SERPL-CCNC: 435 U/L (ref 55–170)
CO2 SERPL-SCNC: 24 MMOL/L (ref 22–30)
CREAT SERPL-MCNC: 1 MG/DL (ref 0.52–1.25)
EOSINOPHIL # BLD AUTO: 0.1 10^3/UL (ref 0–0.6)
EOSINOPHIL NFR BLD AUTO: 1.3 % (ref 0–6)
ERYTHROCYTE [DISTWIDTH] IN BLOOD BY AUTOMATED COUNT: 14 % (ref 11.5–14)
GLUCOSE SERPL-MCNC: 101 MG/DL (ref 75–110)
HCT VFR BLD CALC: 30.5 % (ref 37.9–51)
HGB BLD-MCNC: 10.4 G/DL (ref 13.5–17)
HGB HCT DIFFERENCE: 0.7
LYMPHOCYTES # BLD AUTO: 0.6 10^3/UL (ref 0.5–4.7)
LYMPHOCYTES NFR BLD AUTO: 6.2 % (ref 13–45)
MCH RBC QN AUTO: 27.9 PG (ref 27–33.4)
MCHC RBC AUTO-ENTMCNC: 34.1 G/DL (ref 32–36)
MCV RBC AUTO: 82 FL (ref 80–97)
MONOCYTES # BLD AUTO: 0.9 10^3/UL (ref 0.1–1.4)
MONOCYTES NFR BLD AUTO: 8.8 % (ref 3–13)
NEUTROPHILS # BLD AUTO: 8.2 10^3/UL (ref 1.7–8.2)
NEUTS SEG NFR BLD AUTO: 83.2 % (ref 42–78)
POTASSIUM SERPL-SCNC: 4.2 MMOL/L (ref 3.6–5)
PROT SERPL-MCNC: 7 G/DL (ref 6.3–8.2)
PROTHROMBIN TIME: 21 SEC (ref 11.4–15.4)
RBC # BLD AUTO: 3.73 10^6/UL (ref 4.35–5.55)
SODIUM SERPL-SCNC: 132.9 MMOL/L (ref 137–145)
WBC # BLD AUTO: 9.9 10^3/UL (ref 4–10.5)

## 2017-09-27 PROCEDURE — 85025 COMPLETE CBC W/AUTO DIFF WBC: CPT

## 2017-09-27 PROCEDURE — 93010 ELECTROCARDIOGRAM REPORT: CPT

## 2017-09-27 PROCEDURE — 84484 ASSAY OF TROPONIN QUANT: CPT

## 2017-09-27 PROCEDURE — 85610 PROTHROMBIN TIME: CPT

## 2017-09-27 PROCEDURE — 36415 COLL VENOUS BLD VENIPUNCTURE: CPT

## 2017-09-27 PROCEDURE — 82550 ASSAY OF CK (CPK): CPT

## 2017-09-27 PROCEDURE — 71010: CPT

## 2017-09-27 PROCEDURE — 93005 ELECTROCARDIOGRAM TRACING: CPT

## 2017-09-27 PROCEDURE — 85730 THROMBOPLASTIN TIME PARTIAL: CPT

## 2017-09-27 PROCEDURE — 80053 COMPREHEN METABOLIC PANEL: CPT

## 2017-09-27 PROCEDURE — 99285 EMERGENCY DEPT VISIT HI MDM: CPT

## 2017-09-27 NOTE — RADIOLOGY REPORT (SQ)
EXAM DESCRIPTION:  CHEST SINGLE VIEW



COMPLETED DATE/TIME:  9/27/2017 7:11 pm



REASON FOR STUDY:  bed 15 cp



COMPARISON:  6/4/2017



EXAM PARAMETERS:  NUMBER OF VIEWS: One view.

TECHNIQUE: Single frontal radiographic view of the chest acquired.

RADIATION DOSE: NA

LIMITATIONS: None.



FINDINGS:  LUNGS AND PLEURA: Minimal parenchymal opacity at the left base.  Right lung is clear.

MEDIASTINUM AND HILAR STRUCTURES: No masses.  Contour normal.

HEART AND VASCULAR STRUCTURES: Heart normal in size.  Normal vasculature.

BONES: No acute findings.

HARDWARE: None in the chest.

OTHER: No other significant finding.



IMPRESSION:  Minimal parenchymal opacity at the left lung base.



TECHNICAL DOCUMENTATION:  JOB ID:  [id number]

## 2017-09-27 NOTE — ER DOCUMENT REPORT
ED General





- General


Chief Complaint: Chest Pain


Stated Complaint: BURNING ALL OVER


Time Seen by Provider: 09/27/17 18:47


Notes: 


The patient is an 82-year-old male, past medical history B/L PEs (on Xarelto), 

recent anterior cervical spine surgery, chronic back pain and muscle spasms, 

presents from [facility] after he was having diffuse burning sensation and 

leg cramps.  He has had this in the past, but it worsened after he was 

receiving physical and occupational therapy earlier today.  He also mentioned 

that he had burning over his left chest.  He did not receive his Robaxin or 

oxycodone today.  He was just discharged from [facility] after a spinal 

surgery, UTI and treatment for bilateral PEs.  Patient denies shortness of 

breath, leg swelling, fevers, nausea, vomiting, cough, headache, rash or new 

back pain.


TRAVEL OUTSIDE OF THE [place] IN LAST 30 DAYS: No





- Related Data


Allergies/Adverse Reactions: 


 





No Known Allergies Allergy (Verified 05/27/17 03:31)


 











Past Medical History





- General


Information source: Patient





- Social History


Smoking Status: Unknown if Ever Smoked


Family History: Reviewed & Not Pertinent


Patient has suicidal ideation: No


Patient has homicidal ideation: No





- Past Medical History


Cardiac Medical History: Reports: Hx Hypertension, Hx Pulmonary Embolism


   Denies: Hx Coronary Artery Disease, Hx Heart Attack


Pulmonary Medical History: 


   Denies: Hx Asthma, Hx Bronchitis, Hx COPD, Hx Pneumonia, Hx Tuberculosis


Neurological Medical History: Denies: Hx Cerebrovascular Accident, Hx Seizures


Endocrine Medical History: Denies: Hx Diabetes Mellitus Type 1, Hx Diabetes 

Mellitus Type 2, Hx Graves' Disease, Hx Hyperthyroidism, Hx Hypothyroidism


Renal/ Medical History: Denies: Hx Peritoneal Dialysis


GI Medical History: Denies: Hx Hepatitis


Musculoskeltal Medical History: Reports Hx Arthritis, Denies Hx Fibromyalgia, 

Denies Hx Multiple Sclerosis, Denies Hx Muscular Dystrophy, Denies Hx Muscle 

Spasm, Denies Hx Muscle Weakness, Denies Hx Musculoskeletal Deformity, Denies 

Hx Musculoskeletal Trauma


Skin Medical History: Denies Hx Cellulitis, Denies Hx Eczema, Denies Hx MRSA, 

Denies Hx Psoriasis


Psychiatric Medical History: Reports: Hx Anxiety


   Denies: Hx Attention Deficit Hyperactivity Disorder, Hx Bipolar Disorder, Hx 

Borderline Personality Disorder, Hx Depression, Hx Obsessive Compulsive Disorder

, Hx Personality Disorder, Hx Schizoaffective Disorder, Hx Schizophrenia


Traumatic Medical History: Denies: Hx Fractures, Hx Gunshot Wound, Hx Liver 

Laceration, Hx Pneumothorax, Hx Spine Fracture, Hx Spleen Laceration/Rupture, 

Hx Traumatic Brain Injury


Infectious Medical History: Denies: Hx C-Diff, Hx Hepatitis, Hx HIV, Hx MRSA, 

Hx VRE


Past Surgical History: Reports: Hx Appendectomy, Hx Orthopedic Surgery - foot; 

left hip arthroplasty.  Denies: Hx Pacemaker





- Immunizations


Immunizations up to date: Yes


Hx Diphtheria, Pertussis, Tetanus Vaccination: Yes





Review of Systems





- Review of Systems


Notes: 


REVIEW OF SYSTEMS:


CONSTITUTIONAL: -fevers, -chills


EENT: -eye pain, -difficulty swallowing, -nasal congestion


CARDIOVASCULAR: +chest pain, -syncope.


RESPIRATORY: -cough, -SOB


GASTROINTESTINAL:  -abdominal pain, -nausea, -vomiting, -diarrhea


GENITOURINARY: -dysuria, -hematuria


MUSCULOSKELETAL: -back pain, -neck pain


SKIN: -rash or skin lesions.


HEMATOLOGIC: -easy bruising or bleeding.


LYMPHATIC: -swollen, enlarged glands.


NEUROLOGICAL: -altered mental status or loss of consciousness, -headache, +

diffuse burning sensation


PSYCHIATRIC: -anxiety, -depression.


ALL OTHER SYSTEMS REVIEWED AND NEGATIVE.





Physical Exam





- Vital signs


Vitals: 


 











Temp Pulse Resp BP Pulse Ox


 


 98.4 F   102 H  15   159/93 H  100 


 


 09/27/17 18:48  09/27/17 18:48  09/27/17 18:48  09/27/17 18:48  09/27/17 18:48














- Notes


Notes: 


PHYSICAL EXAMINATION:





GENERAL: Well-appearing, well-nourished and in no acute distress.





HEAD: Atraumatic, normocephalic.





EYES: Pupils equal round and reactive to light, extraocular movements intact, 

sclera anicteric, conjunctiva are normal.





ENT: nares patent, oropharynx clear without exudates.  Moist mucous membranes.





NECK: Normal range of motion, supple without lymphadenopathy





LUNGS: Breath sounds clear to auscultation bilaterally and equal.  No wheezes 

rales or rhonchi.





HEART: Tachycardia. Regular rhythm





ABDOMEN: Soft, nontender, normoactive bowel sounds.  No guarding, no rebound.  

No masses appreciated.





EXTREMITIES: Normal range of motion, no pitting or edema.  No cyanosis.





NEUROLOGICAL: Cranial nerves grossly intact.  Normal speech, normal gait.  

Normal sensory and motor exams.





PSYCH: Normal mood, normal affect.





SKIN: Warm, Dry, normal turgor, no rashes or lesions noted.





Course





- Re-evaluation


Re-evalutation: 


Patient appears well.  His EKG and blood work did not show any acute 

abnormalities.  His chest x-ray shows a possible left lower lobe opacity.  He 

is mildly tachycardic, but he has known PEs and is taking his Xarelto as 

instructed.  He is in no respiratory distress and his blood pressure is normal.

  Patient has chronic diffuse burning sensation and it resolved after his home 

dose of oxycodone.  His muscle spasms that are also chronic in nature resolved 

after his home dose of Robaxin.  Patient's symptoms are atypical for ACS or 

aortic dissection at this time.  He is safe for outpatient follow-up with his 

primary care physician for further evaluation and treatment.





- Vital Signs


Vital signs: 


 











Temp Pulse Resp BP Pulse Ox


 


 98.4 F   102 H  15   159/93 H  100 


 


 09/27/17 18:48  09/27/17 18:48  09/27/17 18:48  09/27/17 18:48  09/27/17 19:00














- Laboratory


Result Diagrams: 


 09/27/17 20:15





 09/27/17 20:15


Laboratory results interpreted by me: 


 











  09/27/17 09/27/17 09/27/17





  20:15 20:15 20:15


 


RBC  3.73 L  


 


Hgb  10.4 L  


 


Hct  30.5 L  


 


Plt Count  599 H  


 


Seg Neutrophils %  83.2 H  


 


Lymphocytes %  6.2 L  


 


PT    21.0 H


 


APTT    54.6 H


 


Sodium   132.9 L 


 


Direct Bilirubin   0.5 H 


 


AST   80 H 


 


ALT   83 H 


 


Alkaline Phosphatase   177 H 


 


Creatine Kinase   435 H 


 


Albumin   3.3 L 














- Diagnostic Test


Radiology reviewed: Image reviewed, Reports reviewed


Radiology results interpreted by me: 


CXR: Minimal parenchymal opacity at the left lung base.





- EKG Interpretation by Me


EKG shows normal: Sinus rhythm, Axis, Intervals, QRS Complexes, ST-T Waves


Rate: Normal





Discharge





- Discharge


Clinical Impression: 


 Muscle cramps





Chest pain


Qualifiers:


 Chest pain type: unspecified Qualified Code(s): R07.9 - Chest pain, unspecified





Pneumonia


Qualifiers:


 Pneumonia type: due to unspecified organism Laterality: left Lung location: 

lower lobe of lung Qualified Code(s): J18.1 - Lobar pneumonia, unspecified 

organism





Condition: Stable


Disposition: HOME, SELF-CARE


Additional Instructions: 


CHEST PAIN OF UNCLEAR CAUSE:





     The exact cause of your chest pain isn't clear.  Fortunately, there is no 

evidence of a dangerous medical condition.  Further testing may be required to 

find the source of the pain.


     Most often, we find that this pain is coming from the chest wall -- the 

muscles or rib joints in the chest.  But chest pain can come from the lung and 

lung lining, the esophagus, the heart valves or heart lining, and even the 

stomach or gallbladder.


     Rest.  Eat lightly until the pain is gone.  We may prescribe medicine for 

pain and inflammation.


     You should call the physician immediately if the pain radiates to the 

shoulder, jaw or arms; if you start to run a fever or develop a cough; or if 

you develop shortness of breath, or other new or alarming symptoms.








NORMAL EXAM AND WORKUP:


     At this time, your examination and workup show no significant abnormality.

  No significant abnormal physical findings were noted.  All laboratory, EKG, 

and imaging (x-ray, CT scans, ultrasound) studies that were ordered show no 

significant abnormality.


     Although your examination and all studies that were ordered showed no 

significant abnormal finding, there are no examinations and no studies that are 

100% accurate.  There is always the possibility that some abnormality could 

exist and not be detected with physical examination or within the limits and 

capabilities of laboratory and other studies.


     You should return or follow up as you were instructed on your visit today 

for further evaluation if your symptoms do not resolve.








CHEST WALL PAIN:


     Your chest pain may be coming from the chest wall. This is often caused by 

straining the muscles or joints in the chest during physical activity, direct 

trauma, coughing, or vigorous vomiting.  Persons with arthritis are especially 

prone to this type of pain, due to inflammation of the cartilage joints near 

the breast bone. Occasionally, no cause can be found.


     Rest from strenuous physical activity.  This kind of chest pain is usually 

made worse by movement of the chest.  Depending on the symptoms, we may 

prescribe medicine for pain, muscle relaxation, and antiinflammatory effects.


     If the pain is new, and seems to be due to muscle strain, cold packs can 

help.  Otherwise, apply gentle warmth to the painful area for 15 minutes every 

hour or two.


     You should call contact the doctor immediately if things change. Further 

evaluation is needed if you develop a fever or cough, if the nature of the pain 

changes, or if you become short of breath.








FOLLOW-UP CARE:


If you have been referred to a physician for follow-up care, call the physician

s office for an appointment as you were instructed or within the next two days.

  If you experience worsening or a significant change in your symptoms, notify 

the physician immediately or return to the Emergency Department at any time for 

re-evaluation.





Myalagia (Muscle Pain)





     Myalgia is pain in the muscles. We use the word myalgia to describe muscle 

pain where there's no history of injury, no known muscle disease, and the 

muscles are normal to examination. Myalgias can be a symptom of an acute illness

, such as influenza, hepatitis, or any viral illness, especially with fever.  

Sometimes the muscle pain comes before any other symptoms. Myalgia can also be 

an early symptom of inflammatory muscle disease, such as lupus.


     If myalgia is accompanied by an acute illness that explains the muscle pain

, then no further testing needs to be done. When there's no clear reason for 

the pain, tests may be done to see if there's an inflammatory or other disease 

of the muscles.


     The usual treatment for myalgias is anti-inflammatory medication, such as 

ibuprofen. Muscle aches may be soothed with a heating pad or hot compress.


     If muscles remain painful for more than a few days, you'll need testing 

and followup. Return if a muscle becomes swollen, red, or severely painful.


Prescriptions: 


Levofloxacin [Levaquin 750 mg Tablet] 750 mg PO DAILY #10 tablet


Forms:  Elevated Blood Pressure


Referrals: 


FABIANA GARDNER MD [ACTIVE STAFF] - Follow up as needed

## 2017-09-27 NOTE — EKG REPORT
SEVERITY:- ABNORMAL ECG -

SINUS TACHYCARDIA

MULTIPLE ATRIAL PREMATURE COMPLEXES

PROBABLE LEFT ATRIAL ABNORMALITY

:

Confirmed by: Jessica Guajardo MD 27-Sep-2017 21:20:12

## 2017-09-28 VITALS — DIASTOLIC BLOOD PRESSURE: 89 MMHG | SYSTOLIC BLOOD PRESSURE: 155 MMHG

## 2017-10-02 ENCOUNTER — HOSPITAL ENCOUNTER (EMERGENCY)
Dept: HOSPITAL 62 - ER | Age: 82
LOS: 1 days | Discharge: HOME | End: 2017-10-03
Payer: MEDICARE

## 2017-10-02 DIAGNOSIS — I10: ICD-10-CM

## 2017-10-02 DIAGNOSIS — Z86.711: ICD-10-CM

## 2017-10-02 DIAGNOSIS — M54.12: Primary | ICD-10-CM

## 2017-10-02 DIAGNOSIS — R07.89: ICD-10-CM

## 2017-10-02 DIAGNOSIS — L98.429: ICD-10-CM

## 2017-10-02 LAB
ALBUMIN SERPL-MCNC: 3.6 G/DL (ref 3.5–5)
ALP SERPL-CCNC: 163 U/L (ref 38–126)
ALT SERPL-CCNC: 66 U/L (ref 21–72)
ANION GAP SERPL CALC-SCNC: 11 MMOL/L (ref 5–19)
AST SERPL-CCNC: 48 U/L (ref 17–59)
BASOPHILS # BLD AUTO: 0 10^3/UL (ref 0–0.2)
BASOPHILS NFR BLD AUTO: 0.3 % (ref 0–2)
BILIRUB DIRECT SERPL-MCNC: 0.6 MG/DL (ref 0–0.4)
BILIRUB SERPL-MCNC: 0.6 MG/DL (ref 0.2–1.3)
BUN SERPL-MCNC: 25 MG/DL (ref 7–20)
CALCIUM: 9.8 MG/DL (ref 8.4–10.2)
CHLORIDE SERPL-SCNC: 100 MMOL/L (ref 98–107)
CK MB SERPL-MCNC: 2.1 NG/ML (ref ?–4.55)
CK SERPL-CCNC: 224 U/L (ref 55–170)
CO2 SERPL-SCNC: 22 MMOL/L (ref 22–30)
CREAT SERPL-MCNC: 1.06 MG/DL (ref 0.52–1.25)
EOSINOPHIL # BLD AUTO: 0.1 10^3/UL (ref 0–0.6)
EOSINOPHIL NFR BLD AUTO: 0.5 % (ref 0–6)
ERYTHROCYTE [DISTWIDTH] IN BLOOD BY AUTOMATED COUNT: 14.4 % (ref 11.5–14)
GLUCOSE SERPL-MCNC: 110 MG/DL (ref 75–110)
HCT VFR BLD CALC: 31.6 % (ref 37.9–51)
HGB BLD-MCNC: 11.1 G/DL (ref 13.5–17)
HGB HCT DIFFERENCE: 1.7
LYMPHOCYTES # BLD AUTO: 1.1 10^3/UL (ref 0.5–4.7)
LYMPHOCYTES NFR BLD AUTO: 10.5 % (ref 13–45)
MCH RBC QN AUTO: 28.7 PG (ref 27–33.4)
MCHC RBC AUTO-ENTMCNC: 35.1 G/DL (ref 32–36)
MCV RBC AUTO: 82 FL (ref 80–97)
MONOCYTES # BLD AUTO: 0.7 10^3/UL (ref 0.1–1.4)
MONOCYTES NFR BLD AUTO: 6.9 % (ref 3–13)
NEUTROPHILS # BLD AUTO: 8.4 10^3/UL (ref 1.7–8.2)
NEUTS SEG NFR BLD AUTO: 81.8 % (ref 42–78)
POTASSIUM SERPL-SCNC: 4.1 MMOL/L (ref 3.6–5)
PROT SERPL-MCNC: 7.7 G/DL (ref 6.3–8.2)
RBC # BLD AUTO: 3.86 10^6/UL (ref 4.35–5.55)
SODIUM SERPL-SCNC: 133.1 MMOL/L (ref 137–145)
TROPONIN I SERPL-MCNC: < 0.012 NG/ML
WBC # BLD AUTO: 10.3 10^3/UL (ref 4–10.5)

## 2017-10-02 PROCEDURE — 80053 COMPREHEN METABOLIC PANEL: CPT

## 2017-10-02 PROCEDURE — 96374 THER/PROPH/DIAG INJ IV PUSH: CPT

## 2017-10-02 PROCEDURE — 84484 ASSAY OF TROPONIN QUANT: CPT

## 2017-10-02 PROCEDURE — 85025 COMPLETE CBC W/AUTO DIFF WBC: CPT

## 2017-10-02 PROCEDURE — 82553 CREATINE MB FRACTION: CPT

## 2017-10-02 PROCEDURE — 36415 COLL VENOUS BLD VENIPUNCTURE: CPT

## 2017-10-02 PROCEDURE — 93005 ELECTROCARDIOGRAM TRACING: CPT

## 2017-10-02 PROCEDURE — 82550 ASSAY OF CK (CPK): CPT

## 2017-10-02 PROCEDURE — 99284 EMERGENCY DEPT VISIT MOD MDM: CPT

## 2017-10-02 PROCEDURE — 71010: CPT

## 2017-10-02 PROCEDURE — 93010 ELECTROCARDIOGRAM REPORT: CPT

## 2017-10-02 NOTE — RADIOLOGY REPORT (SQ)
EXAM DESCRIPTION:  CHEST SINGLE VIEW



COMPLETED DATE/TIME:  10/2/2017 10:42 pm



REASON FOR STUDY:  chest pain



COMPARISON:  9/27/2017



EXAM PARAMETERS:  NUMBER OF VIEWS: One view.

TECHNIQUE: Single frontal radiographic view of the chest acquired.

RADIATION DOSE: NA

LIMITATIONS: None.



FINDINGS:  LUNGS AND PLEURA: Mild medial bibasilar subsegmental atelectasis.  No pneumothorax. No ple
ural effusion.

MEDIASTINUM AND HILAR STRUCTURES: Stable.

HEART AND VASCULAR STRUCTURES: Stable.

BONES: No acute findings.

HARDWARE: None in the chest.

OTHER: No other significant finding.



IMPRESSION:  Mild medial bibasilar subsegmental atelectasis.



TECHNICAL DOCUMENTATION:  JOB ID:  [id number]

## 2017-10-03 VITALS — DIASTOLIC BLOOD PRESSURE: 80 MMHG | SYSTOLIC BLOOD PRESSURE: 139 MMHG

## 2017-10-03 NOTE — EKG REPORT
SEVERITY:- ABNORMAL ECG -

SINUS RHYTHM

PROLONGED QT INTERVAL

:

Confirmed by: Jessica Guajardo MD 03-Oct-2017 04:29:49

## 2017-10-06 ENCOUNTER — HOSPITAL ENCOUNTER (EMERGENCY)
Dept: HOSPITAL 62 - ER | Age: 82
Discharge: HOME | End: 2017-10-06
Payer: MEDICARE

## 2017-10-06 VITALS — SYSTOLIC BLOOD PRESSURE: 138 MMHG | DIASTOLIC BLOOD PRESSURE: 87 MMHG

## 2017-10-06 DIAGNOSIS — Z79.01: ICD-10-CM

## 2017-10-06 DIAGNOSIS — Z79.899: ICD-10-CM

## 2017-10-06 DIAGNOSIS — Z87.891: ICD-10-CM

## 2017-10-06 DIAGNOSIS — R00.0: ICD-10-CM

## 2017-10-06 DIAGNOSIS — R07.89: Primary | ICD-10-CM

## 2017-10-06 LAB
ANION GAP SERPL CALC-SCNC: 10 MMOL/L (ref 5–19)
BASOPHILS # BLD AUTO: 0.1 10^3/UL (ref 0–0.2)
BASOPHILS NFR BLD AUTO: 0.6 % (ref 0–2)
BUN SERPL-MCNC: 24 MG/DL (ref 7–20)
CALCIUM: 9.1 MG/DL (ref 8.4–10.2)
CHLORIDE SERPL-SCNC: 103 MMOL/L (ref 98–107)
CO2 SERPL-SCNC: 22 MMOL/L (ref 22–30)
CREAT SERPL-MCNC: 0.96 MG/DL (ref 0.52–1.25)
EOSINOPHIL # BLD AUTO: 0 10^3/UL (ref 0–0.6)
EOSINOPHIL NFR BLD AUTO: 0.5 % (ref 0–6)
ERYTHROCYTE [DISTWIDTH] IN BLOOD BY AUTOMATED COUNT: 14.4 % (ref 11.5–14)
GLUCOSE SERPL-MCNC: 100 MG/DL (ref 75–110)
HCT VFR BLD CALC: 29.6 % (ref 37.9–51)
HGB BLD-MCNC: 10.2 G/DL (ref 13.5–17)
HGB HCT DIFFERENCE: 1
LYMPHOCYTES # BLD AUTO: 1 10^3/UL (ref 0.5–4.7)
LYMPHOCYTES NFR BLD AUTO: 11.9 % (ref 13–45)
MCH RBC QN AUTO: 28.5 PG (ref 27–33.4)
MCHC RBC AUTO-ENTMCNC: 34.4 G/DL (ref 32–36)
MCV RBC AUTO: 83 FL (ref 80–97)
MONOCYTES # BLD AUTO: 0.7 10^3/UL (ref 0.1–1.4)
MONOCYTES NFR BLD AUTO: 8.3 % (ref 3–13)
NEUTROPHILS # BLD AUTO: 6.8 10^3/UL (ref 1.7–8.2)
NEUTS SEG NFR BLD AUTO: 78.7 % (ref 42–78)
POTASSIUM SERPL-SCNC: 3.9 MMOL/L (ref 3.6–5)
RBC # BLD AUTO: 3.58 10^6/UL (ref 4.35–5.55)
SODIUM SERPL-SCNC: 134.5 MMOL/L (ref 137–145)
WBC # BLD AUTO: 8.7 10^3/UL (ref 4–10.5)

## 2017-10-06 PROCEDURE — 85379 FIBRIN DEGRADATION QUANT: CPT

## 2017-10-06 PROCEDURE — 99285 EMERGENCY DEPT VISIT HI MDM: CPT

## 2017-10-06 PROCEDURE — 93010 ELECTROCARDIOGRAM REPORT: CPT

## 2017-10-06 PROCEDURE — 84484 ASSAY OF TROPONIN QUANT: CPT

## 2017-10-06 PROCEDURE — 36415 COLL VENOUS BLD VENIPUNCTURE: CPT

## 2017-10-06 PROCEDURE — 71010: CPT

## 2017-10-06 PROCEDURE — 96374 THER/PROPH/DIAG INJ IV PUSH: CPT

## 2017-10-06 PROCEDURE — 71275 CT ANGIOGRAPHY CHEST: CPT

## 2017-10-06 PROCEDURE — 96361 HYDRATE IV INFUSION ADD-ON: CPT

## 2017-10-06 PROCEDURE — 80048 BASIC METABOLIC PNL TOTAL CA: CPT

## 2017-10-06 PROCEDURE — 85025 COMPLETE CBC W/AUTO DIFF WBC: CPT

## 2017-10-06 PROCEDURE — 93005 ELECTROCARDIOGRAM TRACING: CPT

## 2017-10-06 NOTE — ER DOCUMENT REPORT
ED General





- General


Chief Complaint: Chest Wall Pain


Stated Complaint: CHEST WALL PAIN


Time Seen by Provider: 10/06/17 00:33


Notes: 





Patient is an 82-year-old male who presents with ongoing left sided chest wall 

pain that has been intermittent for the past 5-6 days.  Patient was seen in the 

emergency department several days ago for the same complaint and had a 

reassuring evaluation at that time.  Patient reports that re-presents today 

stating of the pain is not yet resolved.  Describes as a sharp, stabbing, 

intermittent pain over the left anterior chest wall.  Movement worsens the 

pain.  He has not tried anything to improve the pain.  Denies any associated 

diaphoresis, shortness of breath or vomiting.  He does have a history of DVT 

and pulmonary embolus.  He is currently anticoagulant on rivaroxaban.


TRAVEL OUTSIDE OF THE [place] IN LAST 30 DAYS: No





- Related Data


Allergies/Adverse Reactions: 


 





No Known Allergies Allergy (Verified 10/02/17 23:30)


 











Past Medical History





- General


Information source: Patient





- Social History


Smoking Status: Former Smoker


Frequency of alcohol use: None


Drug Abuse: None


Lives with: Nursing Home


Family History: Reviewed & Not Pertinent


Patient has suicidal ideation: No


Patient has homicidal ideation: No





- Past Medical History


Cardiac Medical History: Reports: Hx Hypertension, Hx Pulmonary Embolism


   Denies: Hx Coronary Artery Disease, Hx Heart Attack


Pulmonary Medical History: 


   Denies: Hx Asthma, Hx Bronchitis, Hx COPD, Hx Pneumonia, Hx Tuberculosis


Neurological Medical History: Denies: Hx Cerebrovascular Accident, Hx Seizures


Endocrine Medical History: Denies: Hx Diabetes Mellitus Type 1, Hx Diabetes 

Mellitus Type 2, Hx Graves' Disease, Hx Hyperthyroidism, Hx Hypothyroidism


Renal/ Medical History: Denies: Hx Peritoneal Dialysis


GI Medical History: Denies: Hx Hepatitis


Musculoskeltal Medical History: Reports Hx Arthritis, Denies Hx Fibromyalgia, 

Denies Hx Multiple Sclerosis, Denies Hx Muscular Dystrophy, Denies Hx Muscle 

Spasm, Denies Hx Muscle Weakness, Denies Hx Musculoskeletal Deformity, Denies 

Hx Musculoskeletal Trauma


Skin Medical History: Denies Hx Cellulitis, Denies Hx Eczema, Denies Hx MRSA, 

Denies Hx Psoriasis


Psychiatric Medical History: Reports: Hx Anxiety


   Denies: Hx Attention Deficit Hyperactivity Disorder, Hx Bipolar Disorder, Hx 

Borderline Personality Disorder, Hx Depression, Hx Obsessive Compulsive Disorder

, Hx Personality Disorder, Hx Schizoaffective Disorder, Hx Schizophrenia


Traumatic Medical History: Denies: Hx Fractures, Hx Gunshot Wound, Hx Liver 

Laceration, Hx Pneumothorax, Hx Spine Fracture, Hx Spleen Laceration/Rupture, 

Hx Traumatic Brain Injury


Infectious Medical History: Denies: Hx C-Diff, Hx Hepatitis, Hx HIV, Hx MRSA, 

Hx VRE


Past Surgical History: Reports: Hx Appendectomy, Hx Orthopedic Surgery - foot; 

left hip arthroplasty.  Denies: Hx Pacemaker





- Immunizations


Immunizations up to date: Yes


Hx Diphtheria, Pertussis, Tetanus Vaccination: Yes





Review of Systems





- Review of Systems


Notes: 





Constitutional: Negative for fever.


HENT: Negative for sore throat.


Eyes: Negative for visual changes.


Cardiovascular: Positive for chest pain.


Respiratory: Negative for shortness of breath.


Gastrointestinal: Negative for abdominal pain, vomiting or diarrhea.


Genitourinary: Negative for dysuria.


Musculoskeletal: Negative for back pain.


Skin: Negative for rash.


Neurological: Negative for headaches, weakness or numbness.





10 point ROS negative except as marked above and in HPI.





Physical Exam





- Vital signs


Vitals: 


 











Temp Pulse Resp BP Pulse Ox


 


 98.5 F   111 H  22 H  161/91 H  100 


 


 10/06/17 00:59  10/06/17 00:59  10/06/17 00:59  10/06/17 00:59  10/06/17 00:59











Interpretation: Hypertensive, Tachycardic


Notes: 





PHYSICAL EXAMINATION:





GENERAL: Well-appearing, well-nourished and in no acute distress.





HEAD: Atraumatic, normocephalic.





EYES: Pupils equal round and reactive to light, extraocular movements intact, 

sclera anicteric, conjunctiva are normal.





ENT: nares patent, oropharynx clear without exudates.  Moist mucous membranes.





NECK: Normal range of motion, supple without lymphadenopathy





LUNGS: Breath sounds clear to auscultation bilaterally and equal.  No wheezes 

rales or rhonchi.





HEART: Regular tachycardia without murmurs





ABDOMEN: Soft, nontender, normoactive bowel sounds.  No guarding, no rebound.  

No masses appreciated.





EXTREMITIES: Normal range of motion, no pitting or edema.  No cyanosis.





Chest wall: Pain on palpation of the left anterior central chest wall just 

lateral to the sternum





NEUROLOGICAL: No focal neurological deficits. Moves all extremities 

spontaneously and on command.





PSYCH: Normal mood, normal affect.





SKIN: Warm, Dry, normal turgor, well-healed incisional line along the left neck 

just above the level of the clavicle.





Course





- Re-evaluation


Re-evalutation: 





10/06/17 03:16


Patient presents with ongoing left-sided chest wall pain reproducible on 

examination I do suspect that this is musculoskeletal in origin.  He was seen 

in the emergency department 2 days ago for the same presentation had a 

reassuring evaluation at that time.  However, today patient does arrive 

tachycardic and he does have a history of both DVT and pulmonary emboli.  He is 

currently intubated on rivaroxaban.  A d-dimer was sent using an age-adjusted 

cutoff of 0.81 is a positive marker and is unfortunately elevated at 1.8.  

Suspect that this is likely from his recent surgery given his tachycardia and 

ongoing chest pain will proceed with CT of the chest to further evaluate for an 

acute pulmonary embolus.  EKG is without any acute ischemic changes.  A 

troponin is normal and given that he has been having this pain for the past 

several days I do not believe additional serial markers are indicated.  If CTA 

is clear will plan for discharge home





- Vital Signs


Vital signs: 


 











Temp Pulse Resp BP Pulse Ox


 


 98.5 F   111 H  22 H  161/91 H  100 


 


 10/06/17 00:59  10/06/17 00:59  10/06/17 00:59  10/06/17 00:59  10/06/17 00:59














- Laboratory


Result Diagrams: 


 10/06/17 01:49





 10/06/17 01:49


Laboratory results interpreted by me: 


 











  10/06/17 10/06/17 10/06/17





  01:49 01:49 01:49


 


RBC  3.58 L  


 


Hgb  10.2 L  


 


Hct  29.6 L  


 


RDW  14.4 H  


 


Plt Count  474 H  


 


Seg Neutrophils %  78.7 H  


 


Lymphocytes %  11.9 L  


 


D-Dimer   1.80 H 


 


Sodium    134.5 L


 


BUN    24 H














- Diagnostic Test


Radiology reviewed: Image reviewed, Reports reviewed


Radiology results interpreted by me: 





10/06/17 03:17


Chest x-ray: No acute infiltrate or pneumothorax





- EKG Interpretation by Me


Additional EKG results interpreted by me: 





10/06/17 03:17


Sinus tachycardia.  Rate 114.  No ST elevations or depressions.  QTC is 480.





Discharge





- Discharge


Referrals: 


MARIAN PORRAS PA-C [Primary Care Provider] - Follow up as needed

## 2017-10-06 NOTE — EKG REPORT
SEVERITY:- BORDERLINE ECG -

SINUS TACHYCARDIA

BORDERLINE PROLONGED QT INTERVAL

:

Confirmed by: Jessica Guajardo MD 06-Oct-2017 11:16:57

## 2017-10-06 NOTE — RADIOLOGY REPORT (SQ)
EXAM: CT ANGIOGRAM OF THE CHEST USING INTRAVENOUS ADMINISTRATION

OF NONIONIC IODINATED CONTRAST MATERIAL.



CLINICAL INDICATION: Atypical chest pain. History of deep venous

thrombosis.



COMPARISON:  CTA of the chest performed April 24, 2017.



TECHNIQUE: Using helical technique, thin section axial images

were performed through the chest after the uncomplicated

intravenous administration of 100 mL Isovue-370.  Axially

acquired data was then transferred to a dedicated CT workstation

to facilitate parasagittal, coronal and volumetric 3-D

reconstructions.  Representative volumetric 3-D reconstructions

for this examination were permanently stored on the PACS system.

Creatinine equals 0.96.



FINDINGS: Focal flat filling defect in the left lower lobe

pulmonary artery which may represent chronic pulmonary embolism.

A subtle mural based filling defect in a right lower lobe

segmental artery also may represent chronic pulmonary embolism.

No other filling defects in the pulmonary arteries. No central

pulmonary embolism or saddle embolism.



Bilateral emphysematous damage. Bibasilar dependent atelectasis.

No pleural effusions, empyema or pneumothorax.



Cardiac size and contour appears normal. No pericardial effusion.

Mild atherosclerotic calcification in the left main coronary

artery. Bones are normal.



IMPRESSION:



1. 2 small mural based filling defects as described above

suspicious for sequela of chronic pulmonary embolism which appear

new since April 24, 2017 comparison chest CTA. Pulmonary arteries

are otherwise normal.

2. Bibasilar dependent atelectasis.

## 2017-10-06 NOTE — RADIOLOGY REPORT (SQ)
EXAM DESCRIPTION: 



CHEST SINGLE VIEW



CLINICAL HISTORY: 



82 years, Male, chest pain



COMPARISON:

Chest radiograph of October 2, 2017.



NUMBER OF VIEWS:

1



TECHNIQUE:

Routine portable AP chest radiograph technique.



LIMITATIONS:

None.



FINDINGS:



Cardiac size and pulmonary vasculature are normal. Right basilar

8 mm nodule possibly representing nipple shadow. Lungs appear

otherwise clear. No pleural effusions or pneumothorax. Bones

appear intact on this single view.



IMPRESSION:

1. No acute cardiopulmonary disease.

2. Possible 8mm nipple shadow at the right lung base not seen on

the comparison chest radiograph.

 



 2011 [employer]- All Rights Reserved

## 2017-10-06 NOTE — ER DOCUMENT REPORT
Doctor's Note


Notes: 





10/06/17 05:06


Patient resting comfortably, reports his chest pain is much better, pain is 

reproducible on palpation of the left anterior chest wall, CT scan does have 

some abnormal findings which appear to be related to chronic pulmonary embolism

, I did discuss these findings over the phone with Dr. Hdz who was the 

initial treating physician, he reports that patient is on anticoagulating 

medication, and is otherwise stable and he advised to continue with discharge, 

patient is still mildly tachycardic so I ordered a small fluid bolus, we will 

continue to monitor patient with anticipation of discharge back to the nursing 

home


10/06/17 05:50


Patient continues to rest comfortably, he does seem to have improved heart rate 

after small IV fluid bolus, is ranged from , but he has had no further 

complaints and is in no acute distress, I did review his previous charts and he 

generally has a heart rate in the mid 90s-100 range, his heart rate is 

significantly improved from his time of arrival, therefore patient will be 

discharged back to the nursing home with instructions for follow-up

## 2017-10-07 ENCOUNTER — HOSPITAL ENCOUNTER (EMERGENCY)
Dept: HOSPITAL 62 - ER | Age: 82
Discharge: HOME | End: 2017-10-07
Payer: MEDICARE

## 2017-10-07 VITALS — DIASTOLIC BLOOD PRESSURE: 79 MMHG | SYSTOLIC BLOOD PRESSURE: 142 MMHG

## 2017-10-07 DIAGNOSIS — R07.89: Primary | ICD-10-CM

## 2017-10-07 DIAGNOSIS — Z79.899: ICD-10-CM

## 2017-10-07 LAB
ANION GAP SERPL CALC-SCNC: 12 MMOL/L (ref 5–19)
APTT BLD: 45.3 SEC (ref 23.5–35.8)
BASOPHILS # BLD AUTO: 0.1 10^3/UL (ref 0–0.2)
BASOPHILS NFR BLD AUTO: 0.9 % (ref 0–2)
BUN SERPL-MCNC: 23 MG/DL (ref 7–20)
CALCIUM: 9.8 MG/DL (ref 8.4–10.2)
CHLORIDE SERPL-SCNC: 105 MMOL/L (ref 98–107)
CO2 SERPL-SCNC: 19 MMOL/L (ref 22–30)
CREAT SERPL-MCNC: 0.86 MG/DL (ref 0.52–1.25)
EOSINOPHIL # BLD AUTO: 0 10^3/UL (ref 0–0.6)
EOSINOPHIL NFR BLD AUTO: 0.3 % (ref 0–6)
ERYTHROCYTE [DISTWIDTH] IN BLOOD BY AUTOMATED COUNT: 14.8 % (ref 11.5–14)
GLUCOSE SERPL-MCNC: 96 MG/DL (ref 75–110)
HCT VFR BLD CALC: 31.9 % (ref 37.9–51)
HGB BLD-MCNC: 10.9 G/DL (ref 13.5–17)
HGB HCT DIFFERENCE: 0.8
LYMPHOCYTES # BLD AUTO: 0.7 10^3/UL (ref 0.5–4.7)
LYMPHOCYTES NFR BLD AUTO: 7.4 % (ref 13–45)
MCH RBC QN AUTO: 28.3 PG (ref 27–33.4)
MCHC RBC AUTO-ENTMCNC: 34.1 G/DL (ref 32–36)
MCV RBC AUTO: 83 FL (ref 80–97)
MONOCYTES # BLD AUTO: 0.5 10^3/UL (ref 0.1–1.4)
MONOCYTES NFR BLD AUTO: 4.7 % (ref 3–13)
NEUTROPHILS # BLD AUTO: 8.7 10^3/UL (ref 1.7–8.2)
NEUTS SEG NFR BLD AUTO: 86.7 % (ref 42–78)
POTASSIUM SERPL-SCNC: 4.3 MMOL/L (ref 3.6–5)
PROTHROMBIN TIME: 17.8 SEC (ref 11.4–15.4)
RBC # BLD AUTO: 3.85 10^6/UL (ref 4.35–5.55)
SODIUM SERPL-SCNC: 136.2 MMOL/L (ref 137–145)
WBC # BLD AUTO: 10.1 10^3/UL (ref 4–10.5)

## 2017-10-07 PROCEDURE — 84484 ASSAY OF TROPONIN QUANT: CPT

## 2017-10-07 PROCEDURE — 80048 BASIC METABOLIC PNL TOTAL CA: CPT

## 2017-10-07 PROCEDURE — 85025 COMPLETE CBC W/AUTO DIFF WBC: CPT

## 2017-10-07 PROCEDURE — 85730 THROMBOPLASTIN TIME PARTIAL: CPT

## 2017-10-07 PROCEDURE — 93010 ELECTROCARDIOGRAM REPORT: CPT

## 2017-10-07 PROCEDURE — 99285 EMERGENCY DEPT VISIT HI MDM: CPT

## 2017-10-07 PROCEDURE — 93005 ELECTROCARDIOGRAM TRACING: CPT

## 2017-10-07 PROCEDURE — 85610 PROTHROMBIN TIME: CPT

## 2017-10-07 PROCEDURE — 36415 COLL VENOUS BLD VENIPUNCTURE: CPT

## 2017-10-07 NOTE — ER DOCUMENT REPORT
ED General





- General


Chief Complaint: Chest Pain


Stated Complaint: CHEST PAIN


Time Seen by Provider: 10/07/17 09:14


TRAVEL OUTSIDE OF THE [place] IN LAST 30 DAYS: No





- HPI


Patient complains to provider of: Left chest wall pain


Notes: 





Patient is coming in for left chest wall pain.  Patient has been seen for 

similar pain twice in the ER ready.  Patient states his pain the same as he has 

been seen earlier before.  Patient recently had surgery to his neck unclear 

what type of surgery he had patient states pain since that time.  Patient has a 

known DVT at last visit had a CT scan showing a small PE however patient is 

currently on Xarelto.  Patient denies any shortness of breath denies any nausea 

vomiting diaphoresis.  Patient states pain does continue with his previous 

visit.  Patient is in no obvious distress upon my evaluation.  Patient is 

asking for pain medication upon my entrance to the examination room.  States no 

relieving factors of his pain painful to touch.





- Related Data


Allergies/Adverse Reactions: 


 





No Known Allergies Allergy (Verified 10/07/17 09:13)


 








Home Medications: 


 Current Home Medications





Acetaminophen [Tylenol Extra Strength] 1,000 mg PO TID 10/07/17 [History]


Amlodipine Besylate [Norvasc 10 mg Tablet] 10 mg PO DAILY 10/07/17 [History]


Cyproheptadine HCl [Periactin 4 Mg Tablet] 4 mg PO DAILY 10/07/17 [History]


Levothyroxine Sodium [Synthroid 0.025 mg Tablet] 25 mcg PO DAILY 10/07/17 [

History]


Losartan Potassium [Cozaar 50 mg Tablet] 50 mg PO DAILY 10/07/17 [History]


Methocarbamol [Robaxin 750 mg Tablet] 1,500 mg PO Q8H 10/07/17 [History]


Metoprolol Tartrate [Lopressor 25 mg Tablet] 25 mg PO DAILY 10/07/17 [History]


Ondansetron [Zofran Odt] 4 mg PO Q6H PRN 10/07/17 [History]


Oxycodone HCl [Oxy-Ir 5 mg Tablet] 10 mg PO Q4HP PRN 10/07/17 [History]


Polyethylene Glycol 3350 [Miralax Powder 17 gm/Packet] 1 packet PO DAILY 10/07/

17 [History]


Rivaroxaban [Xarelto] 20 mg PO DAILY 10/07/17 [History]


Tetrahydrozoline HCl [Visine 0.05% Oph Soln 15 Ml] 1 drop OU Q4H PRN 10/07/17 [

History]











Past Medical History





- Social History


Smoking Status: Unknown if Ever Smoked


Family History: Reviewed & Not Pertinent





- Past Medical History


Cardiac Medical History: Reports: Hx Hypertension, Hx Pulmonary Embolism


   Denies: Hx Coronary Artery Disease, Hx Heart Attack


Pulmonary Medical History: 


   Denies: Hx Asthma, Hx Bronchitis, Hx COPD, Hx Pneumonia, Hx Tuberculosis


Neurological Medical History: Denies: Hx Cerebrovascular Accident, Hx Seizures


Endocrine Medical History: Denies: Hx Diabetes Mellitus Type 1, Hx Diabetes 

Mellitus Type 2, Hx Graves' Disease, Hx Hyperthyroidism, Hx Hypothyroidism


Renal/ Medical History: Denies: Hx Peritoneal Dialysis


GI Medical History: Denies: Hx Hepatitis


Musculoskeltal Medical History: Reports Hx Arthritis, Denies Hx Fibromyalgia, 

Denies Hx Multiple Sclerosis, Denies Hx Muscular Dystrophy, Denies Hx Muscle 

Spasm, Denies Hx Muscle Weakness, Denies Hx Musculoskeletal Deformity, Denies 

Hx Musculoskeletal Trauma


Skin Medical History: Denies Hx Cellulitis, Denies Hx Eczema, Denies Hx MRSA, 

Denies Hx Psoriasis


Psychiatric Medical History: Reports: Hx Anxiety


   Denies: Hx Attention Deficit Hyperactivity Disorder, Hx Bipolar Disorder, Hx 

Borderline Personality Disorder, Hx Depression, Hx Obsessive Compulsive Disorder

, Hx Personality Disorder, Hx Schizoaffective Disorder, Hx Schizophrenia


Traumatic Medical History: Denies: Hx Fractures, Hx Gunshot Wound, Hx Liver 

Laceration, Hx Pneumothorax, Hx Spine Fracture, Hx Spleen Laceration/Rupture, 

Hx Traumatic Brain Injury


Infectious Medical History: Denies: Hx C-Diff, Hx Hepatitis, Hx HIV, Hx MRSA, 

Hx VRE


Past Surgical History: Reports: Hx Appendectomy, Hx Orthopedic Surgery - foot; 

left hip arthroplasty.  Denies: Hx Pacemaker





- Immunizations


Immunizations up to date: Yes


Hx Diphtheria, Pertussis, Tetanus Vaccination: Yes





Review of Systems





- Review of Systems


Constitutional: No symptoms reported


EENT: No symptoms reported


Cardiovascular: Chest pain


Respiratory: No symptoms reported


Gastrointestinal: No symptoms reported


Genitourinary: No symptoms reported


Male Genitourinary: No symptoms reported


Musculoskeletal: No symptoms reported


Skin: No symptoms reported


Hematologic/Lymphatic: No symptoms reported


Neurological/Psychological: No symptoms reported





Physical Exam





- Vital signs


Vitals: 


 











Pulse Ox


 


 100 


 


 10/07/17 09:02











Interpretation: Normal





- General


General appearance: Appears well, Alert





- HEENT


Head: Normocephalic, Atraumatic


Eyes: Normal


Pupils: PERRL


Notes: 





Surgical scar to the left lateral neck well-healed no signs of infection or 

bleeding.





- Respiratory


Respiratory status: No respiratory distress


Chest status: Tender - Tenderness to palpation of the left side of the chest 

reproduces patient's pain.


Breath sounds: Normal


Chest palpation: Normal





- Cardiovascular


Rhythm: Regular


Heart sounds: Normal auscultation


Murmur: No





- Abdominal


Inspection: Normal


Distension: No distension


Bowel sounds: Normal


Tenderness: Nontender


Organomegaly: No organomegaly





- Back


Back: Normal, Nontender





- Extremities


General upper extremity: Normal inspection, Nontender, Normal color, Normal ROM

, Normal temperature


General lower extremity: Normal inspection, Nontender, Normal color, Normal ROM

, Normal temperature, Normal weight bearing.  No: Madison's sign





- Neurological


Neuro grossly intact: Yes


Cognition: Normal


Orientation: AAOx4


Maliha Coma Scale Eye Opening: Spontaneous


[place] Coma Scale Verbal: Oriented


Maliha Coma Scale Motor: Obeys Commands


Maliha Coma Scale Total: 15


Speech: Normal


Motor strength normal: LUE, RUE, LLE, RLE


Sensory: Normal





- Psychological


Associated symptoms: Normal affect, Normal mood





- Skin


Skin Temperature: Warm


Skin Moisture: Dry


Skin Color: Normal





Course





- Re-evaluation


Re-evalutation: 





10/07/17 14:41


The patient has atypical chest pain as the patient's chest pain is not 

suggestive of  cardiac ischemia, aortic dissection, or other serious etiology. 

Given the extremely low risk of these diagnoses further testing and evaluation 

for these possibilities does not appear to be indicated at this time. The 

patient has been instructed to return if the symptoms worsen or change in any 

way.  Patient has known pulmonary embolism however do not think this is causing 

the patient's pain.  Patient is currently being treated.  Patient continues to 

ask for narcotic pain medication such as morphine.  Expect the patient that 

according to his nursing home notes he is currently on pain regimen with 

oxycodone explained to the patient that he would need to follow-up with his 

primary care physician or physician at the nursing home for further narcotic 

pain control recommended nonnarcotic pain control such as lidocaine patches 

patient was provided a patch here however states it did nothing.  No signs of 

any critical pathology and will be discharged home





- Vital Signs


Vital signs: 


 











Temp Pulse Resp BP Pulse Ox


 


 97.3 F      19   142/79 H  99 


 


 10/07/17 13:13     10/07/17 14:00  10/07/17 14:00  10/07/17 14:00














- Laboratory


Result Diagrams: 


 10/07/17 11:30





 10/07/17 12:47


Laboratory results interpreted by me: 


 











  10/07/17 10/07/17 10/07/17





  11:30 11:30 12:47


 


RBC  3.85 L  


 


Hgb  10.9 L  


 


Hct  31.9 L  


 


RDW  14.8 H  


 


Plt Count  557 H  


 


Seg Neutrophils %  86.7 H  


 


Lymphocytes %  7.4 L  


 


Absolute Neutrophils  8.7 H  


 


PT   17.8 H 


 


APTT   45.3 H 


 


Sodium    136.2 L


 


Carbon Dioxide    19 L


 


BUN    23 H














Discharge





- Discharge


Clinical Impression: 


 Chest wall pain





Condition: Good


Disposition: HOME, SELF-CARE


Instructions:  Chest Wall Pain ([facility])


Additional Instructions: 


Your laboratory studies today show no change in your laboratory values from 

your previous visits that are concerning.  There is no signs of any heart 

damage your EKG remains the same.  Her chest CT from her last visit does show 

small blood clot in your lung however this is being treated currently with your 

administration of Xarelto a blood thinning medication.  At this time I do not 

have any critical pathology for your pain except for postsurgical pain.  I will 

highly recommend discussing her pain control management with your physician at 

the nursing home and your operating physician.  Continue to take pain 

medication as prescribed he may also try alternative ways of controlling her 

pain such as Tylenol or lidocaine patches


Referrals: 


MARIAN PORRAS PA-C [Primary Care Provider] - Follow up as needed

## 2017-10-09 NOTE — EKG REPORT
SEVERITY:- OTHERWISE NORMAL ECG -

SINUS RHYTHM

LOW VOLTAGE IN FRONTAL LEADS

:

Confirmed by: Jessica Guajardo MD 09-Oct-2017 06:26:26

## 2017-10-10 ENCOUNTER — HOSPITAL ENCOUNTER (EMERGENCY)
Dept: HOSPITAL 62 - ER | Age: 82
Discharge: HOME | End: 2017-10-10
Payer: MEDICARE

## 2017-10-10 DIAGNOSIS — R07.89: Primary | ICD-10-CM

## 2017-10-10 DIAGNOSIS — Z86.711: ICD-10-CM

## 2017-10-10 DIAGNOSIS — Z66: ICD-10-CM

## 2017-10-10 DIAGNOSIS — I10: ICD-10-CM

## 2017-10-10 DIAGNOSIS — Z98.890: ICD-10-CM

## 2017-10-10 DIAGNOSIS — Z79.01: ICD-10-CM

## 2017-10-10 DIAGNOSIS — M79.604: ICD-10-CM

## 2017-10-10 LAB
ANION GAP SERPL CALC-SCNC: 10 MMOL/L (ref 5–19)
BASOPHILS # BLD AUTO: 0.1 10^3/UL (ref 0–0.2)
BASOPHILS NFR BLD AUTO: 0.8 % (ref 0–2)
BUN SERPL-MCNC: 19 MG/DL (ref 7–20)
CALCIUM: 9.5 MG/DL (ref 8.4–10.2)
CHLORIDE SERPL-SCNC: 101 MMOL/L (ref 98–107)
CK SERPL-CCNC: 47 U/L (ref 55–170)
CO2 SERPL-SCNC: 23 MMOL/L (ref 22–30)
CREAT SERPL-MCNC: 0.91 MG/DL (ref 0.52–1.25)
EOSINOPHIL # BLD AUTO: 0 10^3/UL (ref 0–0.6)
EOSINOPHIL NFR BLD AUTO: 0.3 % (ref 0–6)
ERYTHROCYTE [DISTWIDTH] IN BLOOD BY AUTOMATED COUNT: 15 % (ref 11.5–14)
GLUCOSE SERPL-MCNC: 104 MG/DL (ref 75–110)
HCT VFR BLD CALC: 34.9 % (ref 37.9–51)
HGB BLD-MCNC: 11.6 G/DL (ref 13.5–17)
HGB HCT DIFFERENCE: -0.1
LYMPHOCYTES # BLD AUTO: 1.3 10^3/UL (ref 0.5–4.7)
LYMPHOCYTES NFR BLD AUTO: 18 % (ref 13–45)
MCH RBC QN AUTO: 27.6 PG (ref 27–33.4)
MCHC RBC AUTO-ENTMCNC: 33.4 G/DL (ref 32–36)
MCV RBC AUTO: 83 FL (ref 80–97)
MONOCYTES # BLD AUTO: 0.6 10^3/UL (ref 0.1–1.4)
MONOCYTES NFR BLD AUTO: 8.9 % (ref 3–13)
NEUTROPHILS # BLD AUTO: 5.1 10^3/UL (ref 1.7–8.2)
NEUTS SEG NFR BLD AUTO: 72 % (ref 42–78)
POTASSIUM SERPL-SCNC: 4.3 MMOL/L (ref 3.6–5)
RBC # BLD AUTO: 4.22 10^6/UL (ref 4.35–5.55)
SODIUM SERPL-SCNC: 133.9 MMOL/L (ref 137–145)
WBC # BLD AUTO: 7 10^3/UL (ref 4–10.5)

## 2017-10-10 PROCEDURE — 84484 ASSAY OF TROPONIN QUANT: CPT

## 2017-10-10 PROCEDURE — 82550 ASSAY OF CK (CPK): CPT

## 2017-10-10 PROCEDURE — 36415 COLL VENOUS BLD VENIPUNCTURE: CPT

## 2017-10-10 PROCEDURE — 93005 ELECTROCARDIOGRAM TRACING: CPT

## 2017-10-10 PROCEDURE — 96374 THER/PROPH/DIAG INJ IV PUSH: CPT

## 2017-10-10 PROCEDURE — 85025 COMPLETE CBC W/AUTO DIFF WBC: CPT

## 2017-10-10 PROCEDURE — 93010 ELECTROCARDIOGRAM REPORT: CPT

## 2017-10-10 PROCEDURE — 71010: CPT

## 2017-10-10 PROCEDURE — 99285 EMERGENCY DEPT VISIT HI MDM: CPT

## 2017-10-10 PROCEDURE — 80048 BASIC METABOLIC PNL TOTAL CA: CPT

## 2017-10-10 NOTE — RADIOLOGY REPORT (SQ)
EXAM DESCRIPTION:  CHEST SINGLE VIEW



COMPLETED DATE/TIME:  10/10/2017 7:35 pm



REASON FOR STUDY:  chest pain



COMPARISON:  10/6/2017



EXAM PARAMETERS:  NUMBER OF VIEWS: One view.

TECHNIQUE: Single frontal radiographic view of the chest acquired.

RADIATION DOSE: NA

LIMITATIONS: None.



FINDINGS:  LUNGS AND PLEURA: No acute opacities, masses or pneumothorax. No pleural effusion.

MEDIASTINUM AND HILAR STRUCTURES: Stable.

HEART AND VASCULAR STRUCTURES: Stable.

BONES: No acute findings.

HARDWARE: None in the chest.

OTHER: No other significant finding.



IMPRESSION:  NO ACUTE RADIOGRAPHIC FINDING IN THE CHEST.



TECHNICAL DOCUMENTATION:  JOB ID:  [id number]

## 2017-10-10 NOTE — ER DOCUMENT REPORT
ED General





- General


Chief Complaint: Pain All Over


Stated Complaint: BODY PAIN


Time Seen by Provider: 10/10/17 19:07


Notes: 


Patient is an 82-year-old male who comes emergency department for chief 

complaint of pain over the left side of his chest and towards his left 

shoulder.  He had cervical surgery about a month ago, he has a follow-up 

tomorrow morning with his surgical hist, he states he does take pain medication 

at the nursing home but he got a sharp cramp in his right leg and then his pain 

was worse in his left chest so he came in.  Cramp in his right leg is resolved.

  He denies vomiting, shortness of breath, fever, cough.  He has a history of 

chronic PE and is currently on anticoagulation.  Patient has advanced 

directives of DO NOT RESUSCITATE.





TRAVEL OUTSIDE OF THE [place] IN LAST 30 DAYS: No





- Related Data


Allergies/Adverse Reactions: 


 





No Known Allergies Allergy (Verified 10/07/17 09:13)


 











Past Medical History





- General


Information source: Patient





- Social History


Smoking Status: Never Smoker


Chew tobacco use (# tins/day): No


Frequency of alcohol use: None


Drug Abuse: None


Lives with: Family


Family History: Reviewed & Not Pertinent


Patient has suicidal ideation: No


Patient has homicidal ideation: No





- Past Medical History


Cardiac Medical History: Reports: Hx Hypertension, Hx Pulmonary Embolism


   Denies: Hx Coronary Artery Disease, Hx Heart Attack


Pulmonary Medical History: 


   Denies: Hx Asthma, Hx Bronchitis, Hx COPD, Hx Pneumonia, Hx Tuberculosis


Neurological Medical History: Denies: Hx Cerebrovascular Accident, Hx Seizures


Endocrine Medical History: Denies: Hx Diabetes Mellitus Type 1, Hx Diabetes 

Mellitus Type 2, Hx Graves' Disease, Hx Hyperthyroidism, Hx Hypothyroidism


Renal/ Medical History: Denies: Hx Peritoneal Dialysis


GI Medical History: Denies: Hx Hepatitis


Musculoskeltal Medical History: Reports Hx Arthritis, Denies Hx Fibromyalgia, 

Denies Hx Multiple Sclerosis, Denies Hx Muscular Dystrophy, Denies Hx Muscle 

Spasm, Denies Hx Muscle Weakness, Denies Hx Musculoskeletal Deformity, Denies 

Hx Musculoskeletal Trauma


Skin Medical History: Denies Hx Cellulitis, Denies Hx Eczema, Denies Hx MRSA, 

Denies Hx Psoriasis


Psychiatric Medical History: Reports: Hx Anxiety


   Denies: Hx Attention Deficit Hyperactivity Disorder, Hx Bipolar Disorder, Hx 

Borderline Personality Disorder, Hx Depression, Hx Obsessive Compulsive Disorder

, Hx Personality Disorder, Hx Schizoaffective Disorder, Hx Schizophrenia


Traumatic Medical History: Denies: Hx Fractures, Hx Gunshot Wound, Hx Liver 

Laceration, Hx Pneumothorax, Hx Spine Fracture, Hx Spleen Laceration/Rupture, 

Hx Traumatic Brain Injury


Infectious Medical History: Denies: Hx C-Diff, Hx Hepatitis, Hx HIV, Hx MRSA, 

Hx VRE


Past Surgical History: Reports: Hx Appendectomy, Hx Orthopedic Surgery - foot; 

left hip arthroplasty.  Denies: Hx Pacemaker





- Immunizations


Immunizations up to date: Yes


Hx Diphtheria, Pertussis, Tetanus Vaccination: Yes





Review of Systems





- Review of Systems


Constitutional: No symptoms reported


EENT: No symptoms reported


Cardiovascular: See HPI


Respiratory: See HPI


Gastrointestinal: No symptoms reported


Genitourinary: No symptoms reported


Male Genitourinary: No symptoms reported


Musculoskeletal: See HPI


Skin: No symptoms reported


Hematologic/Lymphatic: No symptoms reported


Neurological/Psychological: No symptoms reported





Physical Exam





- Vital signs


Vitals: 


 











Resp


 


 16 


 


 10/10/17 20:12











Interpretation: Normal





- General


General appearance: Appears well, Alert


In distress: None





- HEENT


Head: Normocephalic, Atraumatic


Eyes: Normal


Pupils: PERRL





- Respiratory


Respiratory status: No respiratory distress


Chest status: Tender - Generalized tenderness over the left anterior chest wall

, no ecchymosis, swelling, erythema, induration, or other abnormality noted


Breath sounds: Normal


Chest palpation: Normal





- Cardiovascular


Rhythm: Regular.  No: Tachycardia


Heart sounds: Normal auscultation, S1 appreciated, S2 appreciated


Murmur: No





- Abdominal


Inspection: Normal


Distension: No distension


Bowel sounds: Normal


Tenderness: Nontender.  No: Tender, Guarding


Organomegaly: No organomegaly





- Back


Back: Normal, Nontender.  No: Tender, CVA tenderness





- Extremities


General upper extremity: Other - Patient complains of some pain in his left 

upper chest when he moves his left arm, normal strength, normal sensation, 

range of motion is still intact





- Neurological


Neuro grossly intact: Yes


Cognition: Normal


Orientation: AAOx4


Maliha Coma Scale Eye Opening: Spontaneous


[place] Coma Scale Verbal: Oriented


[place] Coma Scale Motor: Obeys Commands


Maliha Coma Scale Total: 15


Speech: Normal


Motor strength normal: LUE, RUE, LLE, RLE


Sensory: Normal





- Psychological


Associated symptoms: Normal affect, Normal mood





- Skin


Skin Temperature: Warm


Skin Moisture: Dry


Skin Color: Normal


Skin irregularity: other - Healed wound with Steri-Strips in place over the 

left anterior neck





Course





- Re-evaluation


Re-evalutation: 


EKG with artifact, no significant change from prior, chest x-ray unremarkable, 

labs including troponin unremarkable.  Symptoms ongoing since surgery, patient 

does not appear to be uncomfortable, vital signs unremarkable.  Patient has 

follow-up with the surgeon tomorrow.  No signs of infection or other concerning 

antibiotic.  Discussed with patient, he states he is ready to go home and he 

will follow-up with his surgeon tomorrow.  Discussed return precautions, 

patient states understanding and agreement.





- Vital Signs


Vital signs: 


 











Temp Pulse Resp BP Pulse Ox


 


 97.5 F   89   20   122/64   99 


 


 10/11/17 00:27  10/11/17 00:27  10/11/17 00:27  10/11/17 00:27  10/11/17 00:27














- Laboratory


Result Diagrams: 


 10/10/17 20:31





 10/10/17 21:15


Laboratory results interpreted by me: 


 











  10/10/17 10/10/17





  20:31 21:15


 


RBC  4.22 L 


 


Hgb  11.6 L 


 


Hct  34.9 L 


 


RDW  15.0 H 


 


Sodium   133.9 L


 


Creatine Kinase   47 L














Discharge





- Discharge


Clinical Impression: 


 Chest wall pain





Condition: Stable


Disposition: HOME, SELF-CARE


Additional Instructions: 


Your examination is consistent with pain after your surgery, please see your 

surgeon tomorrow as planned for follow-up.  


Continue your current medications and discuss with your primary care provider a 

possible increase for additional comfort.


Return to the emergency department for any concerning or worsening symptoms 

including bleeding from the site, spreading redness, fever, vomiting, coughing 

up blood, or any other concerning symptoms.


Referrals: 


MARIAN PORRAS PA-C [Primary Care Provider] - Follow up as needed

## 2017-10-11 VITALS — SYSTOLIC BLOOD PRESSURE: 122 MMHG | DIASTOLIC BLOOD PRESSURE: 64 MMHG

## 2017-10-16 ENCOUNTER — HOSPITAL ENCOUNTER (EMERGENCY)
Dept: HOSPITAL 62 - ER | Age: 82
Discharge: HOME | End: 2017-10-16
Payer: MEDICARE

## 2017-10-16 VITALS — SYSTOLIC BLOOD PRESSURE: 143 MMHG | DIASTOLIC BLOOD PRESSURE: 76 MMHG

## 2017-10-16 DIAGNOSIS — I10: ICD-10-CM

## 2017-10-16 DIAGNOSIS — Z99.81: ICD-10-CM

## 2017-10-16 DIAGNOSIS — Z98.890: ICD-10-CM

## 2017-10-16 DIAGNOSIS — R07.89: ICD-10-CM

## 2017-10-16 DIAGNOSIS — J98.11: ICD-10-CM

## 2017-10-16 DIAGNOSIS — G89.29: Primary | ICD-10-CM

## 2017-10-16 DIAGNOSIS — Z86.711: ICD-10-CM

## 2017-10-16 DIAGNOSIS — M79.1: ICD-10-CM

## 2017-10-16 LAB
ALBUMIN SERPL-MCNC: 3.8 G/DL (ref 3.5–5)
ALP SERPL-CCNC: 130 U/L (ref 38–126)
ALT SERPL-CCNC: 34 U/L (ref 21–72)
ANION GAP SERPL CALC-SCNC: 11 MMOL/L (ref 5–19)
AST SERPL-CCNC: 26 U/L (ref 17–59)
BASOPHILS # BLD AUTO: 0 10^3/UL (ref 0–0.2)
BASOPHILS NFR BLD AUTO: 0.3 % (ref 0–2)
BILIRUB DIRECT SERPL-MCNC: 0.5 MG/DL (ref 0–0.4)
BILIRUB SERPL-MCNC: 0.6 MG/DL (ref 0.2–1.3)
BUN SERPL-MCNC: 13 MG/DL (ref 7–20)
CALCIUM: 9.6 MG/DL (ref 8.4–10.2)
CHLORIDE SERPL-SCNC: 100 MMOL/L (ref 98–107)
CK MB SERPL-MCNC: 1.78 NG/ML (ref ?–4.55)
CK SERPL-CCNC: 119 U/L (ref 55–170)
CO2 SERPL-SCNC: 27 MMOL/L (ref 22–30)
CREAT SERPL-MCNC: 0.9 MG/DL (ref 0.52–1.25)
EOSINOPHIL # BLD AUTO: 0 10^3/UL (ref 0–0.6)
EOSINOPHIL NFR BLD AUTO: 0.3 % (ref 0–6)
ERYTHROCYTE [DISTWIDTH] IN BLOOD BY AUTOMATED COUNT: 15.3 % (ref 11.5–14)
GLUCOSE SERPL-MCNC: 105 MG/DL (ref 75–110)
HCT VFR BLD CALC: 32.4 % (ref 37.9–51)
HGB BLD-MCNC: 10.9 G/DL (ref 13.5–17)
HGB HCT DIFFERENCE: 0.3
LYMPHOCYTES # BLD AUTO: 0.6 10^3/UL (ref 0.5–4.7)
LYMPHOCYTES NFR BLD AUTO: 6.6 % (ref 13–45)
MCH RBC QN AUTO: 28.2 PG (ref 27–33.4)
MCHC RBC AUTO-ENTMCNC: 33.7 G/DL (ref 32–36)
MCV RBC AUTO: 84 FL (ref 80–97)
MONOCYTES # BLD AUTO: 0.5 10^3/UL (ref 0.1–1.4)
MONOCYTES NFR BLD AUTO: 5.6 % (ref 3–13)
NEUTROPHILS # BLD AUTO: 7.4 10^3/UL (ref 1.7–8.2)
NEUTS SEG NFR BLD AUTO: 87.2 % (ref 42–78)
POTASSIUM SERPL-SCNC: 4.2 MMOL/L (ref 3.6–5)
PROT SERPL-MCNC: 7.6 G/DL (ref 6.3–8.2)
RBC # BLD AUTO: 3.87 10^6/UL (ref 4.35–5.55)
SODIUM SERPL-SCNC: 137.9 MMOL/L (ref 137–145)
TROPONIN I SERPL-MCNC: < 0.012 NG/ML
WBC # BLD AUTO: 8.5 10^3/UL (ref 4–10.5)

## 2017-10-16 PROCEDURE — 93005 ELECTROCARDIOGRAM TRACING: CPT

## 2017-10-16 PROCEDURE — 71010: CPT

## 2017-10-16 PROCEDURE — 80053 COMPREHEN METABOLIC PANEL: CPT

## 2017-10-16 PROCEDURE — 93010 ELECTROCARDIOGRAM REPORT: CPT

## 2017-10-16 PROCEDURE — 82553 CREATINE MB FRACTION: CPT

## 2017-10-16 PROCEDURE — 84484 ASSAY OF TROPONIN QUANT: CPT

## 2017-10-16 PROCEDURE — 36415 COLL VENOUS BLD VENIPUNCTURE: CPT

## 2017-10-16 PROCEDURE — 82550 ASSAY OF CK (CPK): CPT

## 2017-10-16 PROCEDURE — 99285 EMERGENCY DEPT VISIT HI MDM: CPT

## 2017-10-16 PROCEDURE — 85025 COMPLETE CBC W/AUTO DIFF WBC: CPT

## 2017-10-16 NOTE — EKG REPORT
SEVERITY:- ABNORMAL ECG -

SINUS TACHYCARDIA

BORDERLINE PROLONGED QT INTERVAL

:

Confirmed by: Caitlyn Sullivan 16-Oct-2017 08:10:10

## 2017-10-16 NOTE — ER DOCUMENT REPORT
ED General





- General


Chief Complaint: Chest Pressure


Stated Complaint: CHEST PAIN


Time Seen by Provider: 10/16/17 06:21


TRAVEL OUTSIDE OF THE [place] IN LAST 30 DAYS: No





- HPI


Patient complains to provider of: Diffuse pain


Notes: 





Patient coming in for evaluation of diffuse pain.  Patient states he is hurting 

all over especially in his left chest.  Patient states is been ongoing since 

his previous visit.  Patient states no new pain.  Patient states pain left 

chest is reproducible to palpation.  Denies any specific abdominal pain denies 

any specific extremity pain patient states he hurts all over.  Patient has been 

seen for this complaint multiple times before.  According to nursing notes 

nursing home notes patient was recently placed on hospice however recently 

discharged from hospice is that the patient was not requiring for hospice care.

  Patient states prior to last visit he has followed up with pain management.  

According to EMS notes patient received 30 mg of oxycodone just prior to 

arrival.  Upon my evaluation patient is initially sleeping easily arousable.  

Patient is on 4 L of oxygen.  Denies any fevers chills denies any cough denies 

any shortness of breath.  Denies nausea vomiting





- Related Data


Allergies/Adverse Reactions: 


 





No Known Allergies Allergy (Verified 10/07/17 09:13)


 











Past Medical History





- Social History


Smoking Status: Unknown if Ever Smoked


Family History: Reviewed & Not Pertinent





- Past Medical History


Cardiac Medical History: Reports: Hx Hypertension, Hx Pulmonary Embolism


   Denies: Hx Coronary Artery Disease, Hx Heart Attack


Pulmonary Medical History: 


   Denies: Hx Asthma, Hx Bronchitis, Hx COPD, Hx Pneumonia, Hx Tuberculosis


Neurological Medical History: Denies: Hx Cerebrovascular Accident, Hx Seizures


Endocrine Medical History: Denies: Hx Diabetes Mellitus Type 1, Hx Diabetes 

Mellitus Type 2, Hx Graves' Disease, Hx Hyperthyroidism, Hx Hypothyroidism


Renal/ Medical History: Denies: Hx Peritoneal Dialysis


GI Medical History: Denies: Hx Hepatitis


Musculoskeltal Medical History: Reports Hx Arthritis, Denies Hx Fibromyalgia, 

Denies Hx Multiple Sclerosis, Denies Hx Muscular Dystrophy, Denies Hx Muscle 

Spasm, Denies Hx Muscle Weakness, Denies Hx Musculoskeletal Deformity, Denies 

Hx Musculoskeletal Trauma


Skin Medical History: Denies Hx Cellulitis, Denies Hx Eczema, Denies Hx MRSA, 

Denies Hx Psoriasis


Psychiatric Medical History: Reports: Hx Anxiety


   Denies: Hx Attention Deficit Hyperactivity Disorder, Hx Bipolar Disorder, Hx 

Borderline Personality Disorder, Hx Depression, Hx Obsessive Compulsive Disorder

, Hx Personality Disorder, Hx Schizoaffective Disorder, Hx Schizophrenia


Traumatic Medical History: Denies: Hx Fractures, Hx Gunshot Wound, Hx Liver 

Laceration, Hx Pneumothorax, Hx Spine Fracture, Hx Spleen Laceration/Rupture, 

Hx Traumatic Brain Injury


Infectious Medical History: Denies: Hx C-Diff, Hx Hepatitis, Hx HIV, Hx MRSA, 

Hx VRE


Past Surgical History: Reports: Hx Appendectomy, Hx Orthopedic Surgery - foot; 

left hip arthroplasty.  Denies: Hx Pacemaker





- Immunizations


Immunizations up to date: Yes


Hx Diphtheria, Pertussis, Tetanus Vaccination: Yes





Review of Systems





- Review of Systems


Constitutional: No symptoms reported


EENT: No symptoms reported


Cardiovascular: Chest pain


Respiratory: No symptoms reported


Gastrointestinal: No symptoms reported


Genitourinary: No symptoms reported


Male Genitourinary: No symptoms reported


Musculoskeletal: Muscle pain


Skin: No symptoms reported


Hematologic/Lymphatic: No symptoms reported


Neurological/Psychological: No symptoms reported


-: Yes All other systems reviewed and negative





Physical Exam





- Vital signs


Vitals: 


 











BP Pulse Ox


 


 132/81 H  100 


 


 10/16/17 05:49  10/16/17 05:49











Interpretation: Normal





- General


General appearance: Alert





- HEENT


Head: Normocephalic, Atraumatic


Eyes: Normal


Pupils: PERRL


Sinus: Normal


Nasal: Normal


Mouth/Lips: Normal


Mucous membranes: Dry


Neck: Normal


Notes: 





Healing surgical scar to the left lateral neck





- Respiratory


Respiratory status: No respiratory distress


Chest status: Tender - Tenderness to palpation of the left chest at the sternal 

border.  Patient states this reproduces his pain.


Breath sounds: Normal


Chest palpation: Normal





- Cardiovascular


Rhythm: Regular


Heart sounds: Normal auscultation


Murmur: No





- Abdominal


Inspection: Normal


Distension: No distension


Bowel sounds: Normal


Tenderness: Nontender


Organomegaly: No organomegaly





- Back


Back: Normal, Nontender





- Extremities


General upper extremity: Normal inspection, Nontender, Normal color, Normal ROM

, Normal temperature


General lower extremity: Normal inspection, Nontender, Normal color, Normal ROM

, Normal temperature, Normal weight bearing.  No: Madison's sign





- Neurological


Neuro grossly intact: Yes


Cognition: Normal


Orientation: AAOx4


[place] Coma Scale Eye Opening: Spontaneous


[place] Coma Scale Verbal: Oriented


[place] Coma Scale Motor: Obeys Commands


[place] Coma Scale Total: 15


Speech: Normal


Motor strength normal: LUE, RUE, LLE, RLE


Sensory: Normal





- Psychological


Associated symptoms: Normal affect, Normal mood





- Skin


Skin Temperature: Warm


Skin Moisture: Dry


Skin Color: Normal





Course





- Re-evaluation


Re-evalutation: 





10/16/17 07:31


Patient coming in for muscle pain chronic pain exacerbation.  No acute changes 

in patient's EKG.  Will evaluate patient's basic laboratory studies


10/16/17 14:39


The patient has atypical chest pain as the patient's chest pain is not 

suggestive of, cardiac ischemia, aortic dissection, or other serious etiology. 

Given the extremely low risk of these diagnoses further testing and evaluation 

for these possibilities does not appear to be indicated at this time. The 

patient has been instructed to return if the symptoms worsen or change in any 

way.  At this time E values not reveal any significant pathology.  Chest x-ray 

shows bilateral opacifications does not coincide with pneumonia more likely 

probably atelectasis patient is on elevated doses of narcotics upon multiple 

evaluations patient is sleeping.  Recommend the patient undergo a sinus 

spirometry to prevent pneumonia formation.  Did discuss this with patient will 

send the patient back to nursing home 





- Vital Signs


Vital signs: 


 











Temp Pulse Resp BP Pulse Ox


 


       15   143/76 H  100 


 


       10/16/17 10:01  10/16/17 10:01  10/16/17 10:01














- Laboratory


Result Diagrams: 


 10/16/17 06:57





 10/16/17 06:57


Laboratory results interpreted by me: 


 











  10/16/17 10/16/17





  06:57 06:57


 


RBC  3.87 L 


 


Hgb  10.9 L 


 


Hct  32.4 L 


 


RDW  15.3 H 


 


Seg Neutrophils %  87.2 H 


 


Lymphocytes %  6.6 L 


 


Direct Bilirubin   0.5 H


 


Alkaline Phosphatase   130 H














Discharge





- Discharge


Clinical Impression: 


 Diffuse pain, Chest wall pain, Atelectasis of both lungs





Condition: Good


Disposition: HOME, SELF-CARE


Instructions:  Chest Wall Pain ([facility])


Additional Instructions: 


At this time your laboratory studies not show any evident changes pathology.  

Please measured she follow-up with your primary care physician.  Your chest x-

ray does show atelectasis caused by not taking in deep breaths.  I recommend 

using the incentive spirometry that we will give you here in the ER taking 10 

deep breaths every hour to prevent pneumonia.


Referrals: 


MARIAN PORRAS PA-C [Primary Care Provider] - Follow up as needed

## 2017-10-16 NOTE — RADIOLOGY REPORT (SQ)
EXAM DESCRIPTION:  CHEST SINGLE VIEW



COMPLETED DATE/TIME:  10/16/2017 6:23 am



REASON FOR STUDY:  cp



COMPARISON:  10/10/2017



EXAM PARAMETERS:  NUMBER OF VIEWS: One view.

TECHNIQUE: Single frontal radiographic view of the chest acquired.

RADIATION DOSE: NA

LIMITATIONS: None.



FINDINGS:  LUNGS AND PLEURA: Minimal bibasilar parenchymal opacities.  No pneumothorax.

MEDIASTINUM AND HILAR STRUCTURES: No masses.  Contour normal.

HEART AND VASCULAR STRUCTURES: Heart normal in size.  Normal vasculature.

BONES: No acute findings.

HARDWARE: None in the chest.

OTHER: No other significant finding.



IMPRESSION:  Minimal bibasilar parenchymal opacities.



TECHNICAL DOCUMENTATION:  JOB ID:  [id number]

## 2017-10-18 ENCOUNTER — HOSPITAL ENCOUNTER (EMERGENCY)
Dept: HOSPITAL 62 - ER | Age: 82
LOS: 1 days | Discharge: HOME | End: 2017-10-19
Payer: MEDICARE

## 2017-10-18 DIAGNOSIS — R07.9: Primary | ICD-10-CM

## 2017-10-18 DIAGNOSIS — I10: ICD-10-CM

## 2017-10-18 DIAGNOSIS — R07.89: ICD-10-CM

## 2017-10-18 LAB
ALBUMIN SERPL-MCNC: 3.7 G/DL (ref 3.5–5)
ALP SERPL-CCNC: 127 U/L (ref 38–126)
ALT SERPL-CCNC: 34 U/L (ref 21–72)
ANION GAP SERPL CALC-SCNC: 11 MMOL/L (ref 5–19)
AST SERPL-CCNC: 25 U/L (ref 17–59)
BASOPHILS # BLD AUTO: 0 10^3/UL (ref 0–0.2)
BASOPHILS NFR BLD AUTO: 0.4 % (ref 0–2)
BILIRUB DIRECT SERPL-MCNC: 0.5 MG/DL (ref 0–0.4)
BILIRUB SERPL-MCNC: 0.5 MG/DL (ref 0.2–1.3)
BUN SERPL-MCNC: 16 MG/DL (ref 7–20)
CALCIUM: 9.6 MG/DL (ref 8.4–10.2)
CHLORIDE SERPL-SCNC: 95 MMOL/L (ref 98–107)
CK SERPL-CCNC: 80 U/L (ref 55–170)
CO2 SERPL-SCNC: 27 MMOL/L (ref 22–30)
CREAT SERPL-MCNC: 0.87 MG/DL (ref 0.52–1.25)
EOSINOPHIL # BLD AUTO: 0.1 10^3/UL (ref 0–0.6)
EOSINOPHIL NFR BLD AUTO: 0.8 % (ref 0–6)
ERYTHROCYTE [DISTWIDTH] IN BLOOD BY AUTOMATED COUNT: 15.3 % (ref 11.5–14)
GLUCOSE SERPL-MCNC: 94 MG/DL (ref 75–110)
HCT VFR BLD CALC: 32.1 % (ref 37.9–51)
HGB BLD-MCNC: 10.9 G/DL (ref 13.5–17)
HGB HCT DIFFERENCE: 0.6
LYMPHOCYTES # BLD AUTO: 0.6 10^3/UL (ref 0.5–4.7)
LYMPHOCYTES NFR BLD AUTO: 6.9 % (ref 13–45)
MCH RBC QN AUTO: 28.3 PG (ref 27–33.4)
MCHC RBC AUTO-ENTMCNC: 34 G/DL (ref 32–36)
MCV RBC AUTO: 83 FL (ref 80–97)
MONOCYTES # BLD AUTO: 0.8 10^3/UL (ref 0.1–1.4)
MONOCYTES NFR BLD AUTO: 9.9 % (ref 3–13)
NEUTROPHILS # BLD AUTO: 6.6 10^3/UL (ref 1.7–8.2)
NEUTS SEG NFR BLD AUTO: 82 % (ref 42–78)
POTASSIUM SERPL-SCNC: 4 MMOL/L (ref 3.6–5)
PROT SERPL-MCNC: 7.4 G/DL (ref 6.3–8.2)
PROTHROMBIN TIME: 14.3 SEC (ref 11.4–15.4)
RBC # BLD AUTO: 3.85 10^6/UL (ref 4.35–5.55)
SODIUM SERPL-SCNC: 133.4 MMOL/L (ref 137–145)
WBC # BLD AUTO: 8.1 10^3/UL (ref 4–10.5)

## 2017-10-18 PROCEDURE — 85610 PROTHROMBIN TIME: CPT

## 2017-10-18 PROCEDURE — 93010 ELECTROCARDIOGRAM REPORT: CPT

## 2017-10-18 PROCEDURE — 80053 COMPREHEN METABOLIC PANEL: CPT

## 2017-10-18 PROCEDURE — 82550 ASSAY OF CK (CPK): CPT

## 2017-10-18 PROCEDURE — 93005 ELECTROCARDIOGRAM TRACING: CPT

## 2017-10-18 PROCEDURE — 85025 COMPLETE CBC W/AUTO DIFF WBC: CPT

## 2017-10-18 PROCEDURE — 82553 CREATINE MB FRACTION: CPT

## 2017-10-18 PROCEDURE — 99285 EMERGENCY DEPT VISIT HI MDM: CPT

## 2017-10-18 PROCEDURE — 84484 ASSAY OF TROPONIN QUANT: CPT

## 2017-10-18 PROCEDURE — 71010: CPT

## 2017-10-18 PROCEDURE — 36415 COLL VENOUS BLD VENIPUNCTURE: CPT

## 2017-10-18 NOTE — ER DOCUMENT REPORT
ED Cardiac





- General


Chief Complaint: Chest Pain


Stated Complaint: CHEST PAIN


Time Seen by Provider: 10/18/17 23:33


Notes: 


The patient is an 82-year-old male, past medical history hypertension, chronic 

chest wall pain (sees pain management and on oxycodone), presents with his 

usual diffuse anterior chest wall pain.  He was seen in the ER multiple times 

for this issue and said that his pain has not changed since his prior visits.  

He denies shortness of breath, leg swelling, nausea, vomiting, fevers, rash, 

radiation of pain, numbness, tingling, abdominal pain, diarrhea or constipation.


TRAVEL OUTSIDE OF THE [place] IN LAST 30 DAYS: No





- Related Data


Allergies/Adverse Reactions: 


 





No Known Allergies Allergy (Verified 10/07/17 09:13)


 











Past Medical History





- General


Information source: Patient





- Social History


Smoking Status: Unknown if Ever Smoked


Family History: Reviewed & Not Pertinent





- Past Medical History


Cardiac Medical History: Reports: Hx Hypertension, Hx Pulmonary Embolism


   Denies: Hx Coronary Artery Disease, Hx Heart Attack


Pulmonary Medical History: 


   Denies: Hx Asthma, Hx Bronchitis, Hx COPD, Hx Pneumonia, Hx Tuberculosis


Neurological Medical History: Denies: Hx Cerebrovascular Accident, Hx Seizures


Endocrine Medical History: Denies: Hx Diabetes Mellitus Type 1, Hx Diabetes 

Mellitus Type 2, Hx Graves' Disease, Hx Hyperthyroidism, Hx Hypothyroidism


Renal/ Medical History: Denies: Hx Peritoneal Dialysis


GI Medical History: Denies: Hx Hepatitis


Musculoskeltal Medical History: Reports Hx Arthritis, Denies Hx Fibromyalgia, 

Denies Hx Multiple Sclerosis, Denies Hx Muscular Dystrophy, Denies Hx Muscle 

Spasm, Denies Hx Muscle Weakness, Denies Hx Musculoskeletal Deformity, Denies 

Hx Musculoskeletal Trauma


Skin Medical History: Denies Hx Cellulitis, Denies Hx Eczema, Denies Hx MRSA, 

Denies Hx Psoriasis


Psychiatric Medical History: Reports: Hx Anxiety


   Denies: Hx Attention Deficit Hyperactivity Disorder, Hx Bipolar Disorder, Hx 

Borderline Personality Disorder, Hx Depression, Hx Obsessive Compulsive Disorder

, Hx Personality Disorder, Hx Schizoaffective Disorder, Hx Schizophrenia


Traumatic Medical History: Denies: Hx Fractures, Hx Gunshot Wound, Hx Liver 

Laceration, Hx Pneumothorax, Hx Spine Fracture, Hx Spleen Laceration/Rupture, 

Hx Traumatic Brain Injury


Infectious Medical History: Denies: Hx C-Diff, Hx Hepatitis, Hx HIV, Hx MRSA, 

Hx VRE


Past Surgical History: Reports: Hx Appendectomy, Hx Orthopedic Surgery - foot; 

left hip arthroplasty.  Denies: Hx Pacemaker





- Immunizations


Immunizations up to date: Yes


Hx Diphtheria, Pertussis, Tetanus Vaccination: Yes





Review of Systems





- Review of Systems


Notes: 


REVIEW OF SYSTEMS:


CONSTITUTIONAL: -fevers, -chills


EENT: -eye pain, -difficulty swallowing, -nasal congestion


CARDIOVASCULAR: +chest pain, -syncope.


RESPIRATORY: -cough, -SOB


GASTROINTESTINAL:  -abdominal pain, - nausea, -vomiting, -diarrhea


GENITOURINARY: -dysuria, -hematuria


MUSCULOSKELETAL: -back pain, -neck pain


SKIN: -rash or skin lesions.


HEMATOLOGIC: -easy bruising or bleeding.


LYMPHATIC: -swollen, enlarged glands.


NEUROLOGICAL: -altered mental status or loss of consciousness, -headache, -

neurologic symptoms


PSYCHIATRIC: -anxiety, -depression.


ALL OTHER SYSTEMS REVIEWED AND NEGATIVE.





Physical Exam





- Vital signs


Vitals: 


 











Temp


 


 98.7 F 


 


 10/18/17 22:57














- Notes


Notes: 


PHYSICAL EXAMINATION:





GENERAL: Well-appearing, well-nourished and in no acute distress.





HEAD: Atraumatic, normocephalic.





EYES: Pupils equal round and reactive to light, extraocular movements intact, 

sclera anicteric, conjunctiva are normal.





ENT: nares patent, oropharynx clear without exudates.  Moist mucous membranes.





NECK: Normal range of motion, supple without lymphadenopathy





LUNGS: Breath sounds clear to auscultation bilaterally and equal.  No wheezes 

rales or rhonchi.





HEART: Regular rate and rhythm without murmurs. Palpation of anterior chest 

wall reproduces his chest wall pain.





ABDOMEN: Soft, nontender, normoactive bowel sounds.  No guarding, no rebound.  

No masses appreciated.





EXTREMITIES: Normal range of motion, no pitting or edema.  No cyanosis.





NEUROLOGICAL: Cranial nerves grossly intact. Normal sensory and motor exams.





SKIN: Warm, Dry, normal turgor, no rashes or lesions noted.





Course





- Re-evaluation


Re-evalutation: 


Patient's chest pain is ongoing for the past several months.  He sees pain 

management for this and takes OxyContin for the chest wall pain.  2 sets of 

troponins and 2 EKGs do not show any acute changes.  Chest x-ray shows improved 

aeration of his lungs compared to his prior x-ray.  Rest of labs are 

unremarkable.  Do not suspect ACS, aortic dissection or PE at this time. Will 

discharge patient back to Nursing Home with f/u at his PMD tomorrow.





- Vital Signs


Vital signs: 


 











Temp Pulse Resp BP Pulse Ox


 


 98.7 F      15   124/75   100 


 


 10/18/17 22:57     10/18/17 23:29  10/18/17 23:29  10/18/17 23:30














- Laboratory


Result Diagrams: 


 10/18/17 23:21





 10/18/17 23:21


Laboratory results interpreted by me: 


 











  10/18/17 10/18/17





  23:21 23:21


 


RBC  3.85 L 


 


Hgb  10.9 L 


 


Hct  32.1 L 


 


RDW  15.3 H 


 


Seg Neutrophils %  82.0 H 


 


Lymphocytes %  6.9 L 


 


Sodium   133.4 L


 


Chloride   95 L


 


Direct Bilirubin   0.5 H


 


Alkaline Phosphatase   127 H














- Diagnostic Test


Radiology reviewed: Image reviewed, Reports reviewed


Radiology results interpreted by me: 


CXR: NAD





- EKG Interpretation by Me


EKG shows normal: Sinus rhythm, Axis, Intervals, QRS Complexes, ST-T Waves


Rate: Normal


When compared to previous EKG there are: No significant change





Discharge





- Discharge


Clinical Impression: 


Chest pain


Qualifiers:


 Chest pain type: unspecified Qualified Code(s): R07.9 - Chest pain, unspecified





Condition: Stable


Disposition: HOME, SELF-CARE


Additional Instructions: 


Your blood work and EKGs do not show any evidence of a heart attack tonight.  

You must follow-up with her primary care physician for further evaluation and 

treatment.





CHEST PAIN OF UNCLEAR CAUSE:





     The exact cause of your chest pain isn't clear.  Fortunately, there is no 

evidence of a dangerous medical condition.  Further testing may be required to 

find the source of the pain.


     Most often, we find that this pain is coming from the chest wall -- the 

muscles or rib joints in the chest.  But chest pain can come from the lung and 

lung lining, the esophagus, the heart valves or heart lining, and even the 

stomach or gallbladder.


     Rest.  Eat lightly until the pain is gone.  We may prescribe medicine for 

pain and inflammation.


     You should call the physician immediately if the pain radiates to the 

shoulder, jaw or arms; if you start to run a fever or develop a cough; or if 

you develop shortness of breath, or other new or alarming symptoms.








NORMAL EXAM AND WORKUP:


     At this time, your examination and workup show no significant abnormality.

  No significant abnormal physical findings were noted.  All laboratory, EKG, 

and imaging (x-ray, CT scans, ultrasound) studies that were ordered show no 

significant abnormality.


     Although your examination and all studies that were ordered showed no 

significant abnormal finding, there are no examinations and no studies that are 

100% accurate.  There is always the possibility that some abnormality could 

exist and not be detected with physical examination or within the limits and 

capabilities of laboratory and other studies.


     You should return or follow up as you were instructed on your visit today 

for further evaluation if your symptoms do not resolve.








CHEST WALL PAIN:


     Your chest pain may be coming from the chest wall. This is often caused by 

straining the muscles or joints in the chest during physical activity, direct 

trauma, coughing, or vigorous vomiting.  Persons with arthritis are especially 

prone to this type of pain, due to inflammation of the cartilage joints near 

the breast bone. Occasionally, no cause can be found.


     Rest from strenuous physical activity.  This kind of chest pain is usually 

made worse by movement of the chest.  Depending on the symptoms, we may 

prescribe medicine for pain, muscle relaxation, and antiinflammatory effects.


     If the pain is new, and seems to be due to muscle strain, cold packs can 

help.  Otherwise, apply gentle warmth to the painful area for 15 minutes every 

hour or two.


     You should call contact the doctor immediately if things change. Further 

evaluation is needed if you develop a fever or cough, if the nature of the pain 

changes, or if you become short of breath.








ANGINA EPISODE:


     Your physician has diagnosed the pain you experienced as an episode of 

angina.  Angina occurs when a portion of the heart muscle temporarily lacks 

oxygen.  It does not cause any permanent heart damage, but serves as a warning.


     Hospitalization is not necessary now.  Evaluation of your cardiac condition

, and medical therapy for angina will be necessary.  It's important you be sure 

to keep all appointments and take medication exactly as prescribed.


     Angina is usually treated with a type of "nitrate" medication. This is 

available as ointment, pills, or sublingual (under the tongue) tablets.  

Depending on your clinical situation, other medications may be added to help 

control angina.  These may include beta blockers or calcium blockers.


     If episodes of angina are occurring with increased frequency, or if chest 

pain lasts longer than 15 minutes or does not respond to nitroglycerin, you 

must seek emergency medical care immediately.








ACID REFLUX DISEASE (GERD):


     Gastro-Esophageal Reflux Disease (GERD) is caused by stomach acid 

refluxing back up into the esophagus. The valve at the end of the esophagus may 

be weak. This is common in persons with a hiatal hernia. GERD symptoms can 

include indigestion, chest pain, heartburn, or food "sticking." Certain foods, 

alcohol, and aspirin can make GERD worse.


     Treatment depends on the severity. Usually, antacids or acid-suppressing 

medicines are used. When the esophagus is acutely inflamed, the physician will 

often prescribe membrane-protective drugs such as Carafate.  Some patients 

benefit from medication such as Reglan that tightens the valve at the top of 

the stomach.


     Avoid those foods that bring on your symptoms. For many people, these 

foods are coffee, chocolate, onions, garlic, and carbonated drinks. Don't use 

alcohol, aspirin, caffeine, or tobacco. Don't eat late at night -- within 4 

hours of bedtime. Don't over-eat. If necessary, elevate the head of your bed 

about 4 inches so that stomach acid will not roll up into your esophagus.


     Call the doctor if you develop severe chest pain, inability to swallow 

fluids, fever, or worsening symptoms.





FOLLOW-UP CARE:


If you have been referred to a physician for follow-up care, call the physician

s office for an appointment as you were instructed or within the next two days.

  If you experience worsening or a significant change in your symptoms, notify 

the physician immediately or return to the Emergency Department at any time for 

re-evaluation.


Referrals: 


MARIAN PORRAS PA-C [Primary Care Provider] - Follow up as needed


FLETCHER FISHER MD [ACTIVE STAFF] - Follow up tomorrow

## 2017-10-18 NOTE — RADIOLOGY REPORT (SQ)
EXAM DESCRIPTION:  CHEST SINGLE VIEW



COMPLETED DATE/TIME:  10/18/2017 11:11 pm



REASON FOR STUDY:  cp



COMPARISON:  10/16/2017



EXAM PARAMETERS:  NUMBER OF VIEWS: One view.

TECHNIQUE: Single frontal radiographic view of the chest acquired.

RADIATION DOSE: NA

LIMITATIONS: None.



FINDINGS:  LUNGS AND PLEURA: Improved aeration in both lung bases.  Minimal residual linear markings.
  No acute opacities, masses or pneumothorax. No pleural effusion.

MEDIASTINUM AND HILAR STRUCTURES: Stable.

HEART AND VASCULAR STRUCTURES: Heart normal in size.  Normal vasculature.

BONES: No acute findings.

HARDWARE: None in the chest.

OTHER: No other significant finding.



IMPRESSION:  Improved aeration in both lung bases.  Minimal residual linear markings.



TECHNICAL DOCUMENTATION:  JOB ID:  [id number]

## 2017-10-19 VITALS — SYSTOLIC BLOOD PRESSURE: 134 MMHG | DIASTOLIC BLOOD PRESSURE: 70 MMHG

## 2017-10-19 LAB
CK MB SERPL-MCNC: 1.51 NG/ML (ref ?–4.55)
TROPONIN I SERPL-MCNC: < 0.012 NG/ML

## 2017-10-19 NOTE — EKG REPORT
SEVERITY:- ABNORMAL ECG -

ATRIAL FLUTTER/FIBRILLATION, A-RATE 234

VENTRICULAR PREMATURE COMPLEX

:

Confirmed by: Jessica Guajardo MD 19-Oct-2017 20:23:58

## 2017-10-20 ENCOUNTER — HOSPITAL ENCOUNTER (EMERGENCY)
Dept: HOSPITAL 62 - ER | Age: 82
Discharge: HOME | End: 2017-10-20
Payer: MEDICARE

## 2017-10-20 VITALS — DIASTOLIC BLOOD PRESSURE: 84 MMHG | SYSTOLIC BLOOD PRESSURE: 155 MMHG

## 2017-10-20 DIAGNOSIS — R07.9: ICD-10-CM

## 2017-10-20 DIAGNOSIS — G89.29: Primary | ICD-10-CM

## 2017-10-20 DIAGNOSIS — M79.1: ICD-10-CM

## 2017-10-20 DIAGNOSIS — E78.00: ICD-10-CM

## 2017-10-20 DIAGNOSIS — Z86.711: ICD-10-CM

## 2017-10-20 PROCEDURE — 99284 EMERGENCY DEPT VISIT MOD MDM: CPT

## 2017-10-20 NOTE — ER DOCUMENT REPORT
ED General





- General


Chief Complaint: Pain All Over


Stated Complaint: BURNING SENSATION ALL OVER


Time Seen by Provider: 10/20/17 16:32


Notes: 


The patient is an 82-year-old male, past medical history chronic chest pain, 

anxiety, chronic neuropathy, presents with his usual burning all over 

sensation.  He has had multiple ER visits for this evaluation.  He saw his 

primary care physician earlier today and Xanax was added to his medication list 

to help out with his anxiety.  According to EMS, his nursing home, [facility], did not have any Xanax at this time and patient requested transfer to 

the ER for this medication.  He denies shortness of breath, nausea, vomiting, 

rash, fevers, back pain, cough, abdominal pain or headache.


TRAVEL OUTSIDE OF THE [place] IN LAST 30 DAYS: No





- Related Data


Allergies/Adverse Reactions: 


 





No Known Allergies Allergy (Verified 10/07/17 09:13)


 











Past Medical History





- General


Information source: Patient





- Social History


Smoking Status: Unknown if Ever Smoked


Family History: Reviewed & Not Pertinent





- Past Medical History


Cardiac Medical History: Reports: Hx Hypertension, Hx Pulmonary Embolism


   Denies: Hx Coronary Artery Disease, Hx Heart Attack


Pulmonary Medical History: 


   Denies: Hx Asthma, Hx Bronchitis, Hx COPD, Hx Pneumonia, Hx Tuberculosis


Neurological Medical History: Denies: Hx Cerebrovascular Accident, Hx Seizures


Endocrine Medical History: Denies: Hx Diabetes Mellitus Type 1, Hx Diabetes 

Mellitus Type 2, Hx Graves' Disease, Hx Hyperthyroidism, Hx Hypothyroidism


Renal/ Medical History: Denies: Hx Peritoneal Dialysis


GI Medical History: Denies: Hx Hepatitis


Musculoskeltal Medical History: Reports Hx Arthritis, Denies Hx Fibromyalgia, 

Denies Hx Multiple Sclerosis, Denies Hx Muscular Dystrophy, Denies Hx Muscle 

Spasm, Denies Hx Muscle Weakness, Denies Hx Musculoskeletal Deformity, Denies 

Hx Musculoskeletal Trauma


Skin Medical History: Denies Hx Cellulitis, Denies Hx Eczema, Denies Hx MRSA, 

Denies Hx Psoriasis


Psychiatric Medical History: Reports: Hx Anxiety


   Denies: Hx Attention Deficit Hyperactivity Disorder, Hx Bipolar Disorder, Hx 

Borderline Personality Disorder, Hx Depression, Hx Obsessive Compulsive Disorder

, Hx Personality Disorder, Hx Schizoaffective Disorder, Hx Schizophrenia


Traumatic Medical History: Denies: Hx Fractures, Hx Gunshot Wound, Hx Liver 

Laceration, Hx Pneumothorax, Hx Spine Fracture, Hx Spleen Laceration/Rupture, 

Hx Traumatic Brain Injury


Infectious Medical History: Denies: Hx C-Diff, Hx Hepatitis, Hx HIV, Hx MRSA, 

Hx VRE


Past Surgical History: Reports: Hx Appendectomy, Hx Orthopedic Surgery - foot; 

left hip arthroplasty.  Denies: Hx Pacemaker





- Immunizations


Immunizations up to date: Yes


Hx Diphtheria, Pertussis, Tetanus Vaccination: Yes





Review of Systems





- Review of Systems


Notes: 


REVIEW OF SYSTEMS:


CONSTITUTIONAL: -fevers, -chills


EENT: -eye pain, -difficulty swallowing, -nasal congestion


CARDIOVASCULAR:-chest pain, -syncope.


RESPIRATORY: -cough, -SOB


GASTROINTESTINAL:  -abdominal pain, - nausea, -vomiting, -diarrhea


GENITOURINARY: -dysuria, -hematuria


MUSCULOSKELETAL: -back pain, -neck pain


SKIN: -rash or skin lesions.


HEMATOLOGIC: -easy bruising or bleeding.


LYMPHATIC: -swollen, enlarged glands.


NEUROLOGICAL: -altered mental status or loss of consciousness, -headache, +

burning sensation


PSYCHIATRIC: -anxiety, -depression.


ALL OTHER SYSTEMS REVIEWED AND NEGATIVE.





Physical Exam





- Vital signs


Vitals: 


 











Temp Pulse Resp BP Pulse Ox


 


 97.3 F   105 H  14   142/76 H  100 


 


 10/20/17 16:37  10/20/17 16:37  10/20/17 16:37  10/20/17 16:37  10/20/17 16:37














- Notes


Notes: 


PHYSICAL EXAMINATION:





GENERAL: Well-appearing, well-nourished and in no acute distress.





HEAD: Atraumatic, normocephalic.





EYES: Pupils equal round and reactive to light, extraocular movements intact, 

sclera anicteric, conjunctiva are normal.





ENT: nares patent, oropharynx clear without exudates.  Moist mucous membranes.





NECK: Normal range of motion, supple without lymphadenopathy





LUNGS: Breath sounds clear to auscultation bilaterally and equal.  No wheezes 

rales or rhonchi.





HEART: Regular rate and rhythm without murmurs





ABDOMEN: Soft, nontender, normoactive bowel sounds.  No guarding, no rebound.  

No masses appreciated.





EXTREMITIES: Normal range of motion, no pitting or edema.  No cyanosis.





NEUROLOGICAL: Cranial nerves grossly intact.  Normal speech, normal gait.  

Normal sensory and motor exams.





PSYCH: Normal mood, normal affect.





SKIN: Warm, Dry, normal turgor, no rashes or lesions noted.





Course





- Re-evaluation


Re-evalutation: 


Patient appears well.  He has had multiple evaluations for this chronic burning 

sensation and his primary care physician recently increased his oxycodone and 

added Xanax.  He is mildly tachycardic, but he has known PEs and is taking his 

Xarelto as instructed.  He is in no respiratory distress and his blood pressure 

is normal.  Patient has chronic diffuse burning sensation and it resolved after 

his home dose of oxycodone.  Patient's symptoms are atypical for ACS or aortic 

dissection at this time.  He is safe for outpatient follow-up with his primary 

care physician for further evaluation and treatment.





- Vital Signs


Vital signs: 


 











Temp Pulse Resp BP Pulse Ox


 


 97.8 F   100   14   155/84 H  100 


 


 10/20/17 18:52  10/20/17 18:52  10/20/17 17:16  10/20/17 18:52  10/20/17 18:52














Discharge





- Discharge


Clinical Impression: 


Chronic pain


Qualifiers:


 Chronic pain type: other chronic pain Qualified Code(s): G89.29 - Other 

chronic pain





Condition: Stable


Disposition: HOME, SELF-CARE


Additional Instructions: 


Neuropathy





     Your symptoms are due to neuropathy.  Neuropathy is nerve damage. There 

are many causes, including diabetes, immune disease, alcohol, blood vessel 

disease, and vitamin deficiency.  The usual symptoms are pain and numbness.


     Neuropathy can occur anywhere, but it's most likely in the "longest" 

nerves.  That's why the feet are most often affected. Sometimes the nerve 

damage can heal.  But if the symptoms have lasted more than a few months, the 

damage is permanent.


     To avoid further damage, treat your underlying health problems carefully.  

If you have diabetes, keep the blood sugar as normal as possible.  Avoid 

alcohol.  Treat high blood pressure and high cholesterol.


     Treating chronic pain can be a problem.  Obviously, you don't want to 

become addicted to pain medicine.  Work closely with your doctor on pain 

management.  Your options include antiinflammatory medicine, anti seizure 

medicine, antidepressants, and pain clinic management.


     Contact the doctor if there is a significant change.





Forms:  Elevated Blood Pressure

## 2017-11-03 ENCOUNTER — HOSPITAL ENCOUNTER (INPATIENT)
Dept: HOSPITAL 62 - ER | Age: 82
LOS: 25 days | Discharge: NURSING, REHAB OR LONG-TERM CARE | DRG: 871 | End: 2017-11-28
Attending: HOSPITALIST | Admitting: HOSPITALIST
Payer: MEDICARE

## 2017-11-03 DIAGNOSIS — E86.0: ICD-10-CM

## 2017-11-03 DIAGNOSIS — N30.01: ICD-10-CM

## 2017-11-03 DIAGNOSIS — Z66: ICD-10-CM

## 2017-11-03 DIAGNOSIS — I21.A1: ICD-10-CM

## 2017-11-03 DIAGNOSIS — N17.9: ICD-10-CM

## 2017-11-03 DIAGNOSIS — N18.3: ICD-10-CM

## 2017-11-03 DIAGNOSIS — I25.10: ICD-10-CM

## 2017-11-03 DIAGNOSIS — M19.90: ICD-10-CM

## 2017-11-03 DIAGNOSIS — Z86.718: ICD-10-CM

## 2017-11-03 DIAGNOSIS — D63.1: ICD-10-CM

## 2017-11-03 DIAGNOSIS — B96.20: ICD-10-CM

## 2017-11-03 DIAGNOSIS — E44.0: ICD-10-CM

## 2017-11-03 DIAGNOSIS — M25.512: ICD-10-CM

## 2017-11-03 DIAGNOSIS — G89.29: ICD-10-CM

## 2017-11-03 DIAGNOSIS — Z86.711: ICD-10-CM

## 2017-11-03 DIAGNOSIS — A41.9: Primary | ICD-10-CM

## 2017-11-03 DIAGNOSIS — D50.9: ICD-10-CM

## 2017-11-03 DIAGNOSIS — N40.0: ICD-10-CM

## 2017-11-03 DIAGNOSIS — Z79.899: ICD-10-CM

## 2017-11-03 DIAGNOSIS — E83.42: ICD-10-CM

## 2017-11-03 DIAGNOSIS — Z79.02: ICD-10-CM

## 2017-11-03 DIAGNOSIS — E87.1: ICD-10-CM

## 2017-11-03 DIAGNOSIS — Z87.891: ICD-10-CM

## 2017-11-03 DIAGNOSIS — D64.9: ICD-10-CM

## 2017-11-03 LAB
ALBUMIN SERPL-MCNC: 3.9 G/DL (ref 3.5–5)
ALP SERPL-CCNC: 141 U/L (ref 38–126)
ALT SERPL-CCNC: 37 U/L (ref 21–72)
ANION GAP SERPL CALC-SCNC: 14 MMOL/L (ref 5–19)
APPEARANCE UR: (no result)
AST SERPL-CCNC: 38 U/L (ref 17–59)
BARBITURATES UR QL SCN: NEGATIVE
BASOPHILS # BLD AUTO: 0.1 10^3/UL (ref 0–0.2)
BASOPHILS NFR BLD AUTO: 0.6 % (ref 0–2)
BILIRUB DIRECT SERPL-MCNC: 0.4 MG/DL (ref 0–0.4)
BILIRUB SERPL-MCNC: 0.6 MG/DL (ref 0.2–1.3)
BILIRUB UR QL STRIP: NEGATIVE
BUN SERPL-MCNC: 12 MG/DL (ref 7–20)
CALCIUM: 9.4 MG/DL (ref 8.4–10.2)
CHLORIDE SERPL-SCNC: 97 MMOL/L (ref 98–107)
CK MB SERPL-MCNC: 2.37 NG/ML (ref ?–4.55)
CK SERPL-CCNC: 56 U/L (ref 55–170)
CO2 SERPL-SCNC: 27 MMOL/L (ref 22–30)
CREAT SERPL-MCNC: 0.8 MG/DL (ref 0.52–1.25)
EOSINOPHIL # BLD AUTO: 0 10^3/UL (ref 0–0.6)
EOSINOPHIL NFR BLD AUTO: 0.5 % (ref 0–6)
ERYTHROCYTE [DISTWIDTH] IN BLOOD BY AUTOMATED COUNT: 16 % (ref 11.5–14)
GLUCOSE SERPL-MCNC: 103 MG/DL (ref 75–110)
GLUCOSE UR STRIP-MCNC: NEGATIVE MG/DL
HCT VFR BLD CALC: 32.8 % (ref 37.9–51)
HGB BLD-MCNC: 11.1 G/DL (ref 13.5–17)
HGB HCT DIFFERENCE: 0.5
KETONES UR STRIP-MCNC: NEGATIVE MG/DL
LYMPHOCYTES # BLD AUTO: 0.8 10^3/UL (ref 0.5–4.7)
LYMPHOCYTES NFR BLD AUTO: 7.7 % (ref 13–45)
MCH RBC QN AUTO: 28.2 PG (ref 27–33.4)
MCHC RBC AUTO-ENTMCNC: 33.8 G/DL (ref 32–36)
MCV RBC AUTO: 83 FL (ref 80–97)
METHADONE UR QL SCN: NEGATIVE
MONOCYTES # BLD AUTO: 0.6 10^3/UL (ref 0.1–1.4)
MONOCYTES NFR BLD AUTO: 6.1 % (ref 3–13)
NEUTROPHILS # BLD AUTO: 8.6 10^3/UL (ref 1.7–8.2)
NEUTS SEG NFR BLD AUTO: 85.1 % (ref 42–78)
NITRITE UR QL STRIP: POSITIVE
PCP UR QL SCN: NEGATIVE
PH UR STRIP: 7 [PH] (ref 5–9)
POTASSIUM SERPL-SCNC: 4.9 MMOL/L (ref 3.6–5)
PROT SERPL-MCNC: 7.8 G/DL (ref 6.3–8.2)
PROT UR STRIP-MCNC: NEGATIVE MG/DL
PROTHROMBIN TIME: 14.4 SEC (ref 11.4–15.4)
RBC # BLD AUTO: 3.93 10^6/UL (ref 4.35–5.55)
SODIUM SERPL-SCNC: 137.8 MMOL/L (ref 137–145)
SP GR UR STRIP: 1.01
TROPONIN I SERPL-MCNC: 0.19 NG/ML
URINE OPIATES LOW: (no result)
UROBILINOGEN UR-MCNC: NEGATIVE MG/DL (ref ?–2)
WBC # BLD AUTO: 10.1 10^3/UL (ref 4–10.5)

## 2017-11-03 PROCEDURE — 87186 SC STD MICRODIL/AGAR DIL: CPT

## 2017-11-03 PROCEDURE — 86920 COMPATIBILITY TEST SPIN: CPT

## 2017-11-03 PROCEDURE — 80202 ASSAY OF VANCOMYCIN: CPT

## 2017-11-03 PROCEDURE — 93306 TTE W/DOPPLER COMPLETE: CPT

## 2017-11-03 PROCEDURE — 84443 ASSAY THYROID STIM HORMONE: CPT

## 2017-11-03 PROCEDURE — 87077 CULTURE AEROBIC IDENTIFY: CPT

## 2017-11-03 PROCEDURE — 80053 COMPREHEN METABOLIC PANEL: CPT

## 2017-11-03 PROCEDURE — 71010: CPT

## 2017-11-03 PROCEDURE — 96365 THER/PROPH/DIAG IV INF INIT: CPT

## 2017-11-03 PROCEDURE — 83550 IRON BINDING TEST: CPT

## 2017-11-03 PROCEDURE — 82550 ASSAY OF CK (CPK): CPT

## 2017-11-03 PROCEDURE — G0378 HOSPITAL OBSERVATION PER HR: HCPCS

## 2017-11-03 PROCEDURE — 87088 URINE BACTERIA CULTURE: CPT

## 2017-11-03 PROCEDURE — 93005 ELECTROCARDIOGRAM TRACING: CPT

## 2017-11-03 PROCEDURE — 96361 HYDRATE IV INFUSION ADD-ON: CPT

## 2017-11-03 PROCEDURE — 71275 CT ANGIOGRAPHY CHEST: CPT

## 2017-11-03 PROCEDURE — 82565 ASSAY OF CREATININE: CPT

## 2017-11-03 PROCEDURE — 84484 ASSAY OF TROPONIN QUANT: CPT

## 2017-11-03 PROCEDURE — 93010 ELECTROCARDIOGRAM REPORT: CPT

## 2017-11-03 PROCEDURE — 86850 RBC ANTIBODY SCREEN: CPT

## 2017-11-03 PROCEDURE — 36415 COLL VENOUS BLD VENIPUNCTURE: CPT

## 2017-11-03 PROCEDURE — 86901 BLOOD TYPING SEROLOGIC RH(D): CPT

## 2017-11-03 PROCEDURE — 81001 URINALYSIS AUTO W/SCOPE: CPT

## 2017-11-03 PROCEDURE — 82271 OCCULT BLOOD OTHER SOURCES: CPT

## 2017-11-03 PROCEDURE — 96375 TX/PRO/DX INJ NEW DRUG ADDON: CPT

## 2017-11-03 PROCEDURE — 82728 ASSAY OF FERRITIN: CPT

## 2017-11-03 PROCEDURE — P9016 RBC LEUKOCYTES REDUCED: HCPCS

## 2017-11-03 PROCEDURE — 82746 ASSAY OF FOLIC ACID SERUM: CPT

## 2017-11-03 PROCEDURE — 36430 TRANSFUSION BLD/BLD COMPNT: CPT

## 2017-11-03 PROCEDURE — 80048 BASIC METABOLIC PNL TOTAL CA: CPT

## 2017-11-03 PROCEDURE — 85610 PROTHROMBIN TIME: CPT

## 2017-11-03 PROCEDURE — 82553 CREATINE MB FRACTION: CPT

## 2017-11-03 PROCEDURE — 86900 BLOOD TYPING SEROLOGIC ABO: CPT

## 2017-11-03 PROCEDURE — 85045 AUTOMATED RETICULOCYTE COUNT: CPT

## 2017-11-03 PROCEDURE — 83735 ASSAY OF MAGNESIUM: CPT

## 2017-11-03 PROCEDURE — 87086 URINE CULTURE/COLONY COUNT: CPT

## 2017-11-03 PROCEDURE — 80307 DRUG TEST PRSMV CHEM ANLYZR: CPT

## 2017-11-03 PROCEDURE — 87040 BLOOD CULTURE FOR BACTERIA: CPT

## 2017-11-03 PROCEDURE — 99291 CRITICAL CARE FIRST HOUR: CPT

## 2017-11-03 PROCEDURE — 83540 ASSAY OF IRON: CPT

## 2017-11-03 PROCEDURE — 84466 ASSAY OF TRANSFERRIN: CPT

## 2017-11-03 PROCEDURE — 82607 VITAMIN B-12: CPT

## 2017-11-03 PROCEDURE — 85025 COMPLETE CBC W/AUTO DIFF WBC: CPT

## 2017-11-03 RX ADMIN — SODIUM CHLORIDE, SODIUM LACTATE, POTASSIUM CHLORIDE, AND CALCIUM CHLORIDE PRN ML: .6; .31; .03; .02 INJECTION, SOLUTION INTRAVENOUS at 18:44

## 2017-11-03 RX ADMIN — OXYCODONE HYDROCHLORIDE PRN MG: 5 TABLET ORAL at 18:35

## 2017-11-03 RX ADMIN — METOPROLOL SUCCINATE SCH MG: 50 TABLET, EXTENDED RELEASE ORAL at 18:19

## 2017-11-03 RX ADMIN — ESCITALOPRAM OXALATE SCH MG: 10 TABLET, FILM COATED ORAL at 21:26

## 2017-11-03 RX ADMIN — ALPRAZOLAM SCH MG: 0.5 TABLET ORAL at 21:26

## 2017-11-03 NOTE — ER DOCUMENT REPORT
ED General





- General


Chief Complaint: Shoulder Pain


Stated Complaint: LEFT SHOULDER PAIN


Time Seen by Provider: 11/03/17 08:35


TRAVEL OUTSIDE OF THE [place] IN LAST 30 DAYS: No





- HPI


Patient complains to provider of: Left shoulder pain


Notes: 





Patient is very well-known to the ER coming and multiple times over the last 

month for left shoulder pain chest wall pain.  Patient coming in today for 

similar complaint states no change in his pain.  Upon my evaluation patient is 

resting comfortably.  Patient is from local nursing home and is a DNR.  Vital 

signs on patient does shows significant tachycardia.  This is new according to 

nursing home notes patient is recently taken off the Xanax.  Patient is on 

chronic opioid therapy for his chest wall pain.  Otherwise patient also 

complains of difficulty in urinating.  Denies any fevers or chills.





- Related Data


Allergies/Adverse Reactions: 


 





No Known Allergies Allergy (Verified 10/07/17 09:13)


 








Home Medications: 


 Current Home Medications





Acetaminophen [Tylenol Extra Strength 500 mg Tablet] 1,000 mg PO Q8 11/03/17 [

History]


Alprazolam [Xanax 0.5 mg Tablet] 0.5 mg PO Q8 11/03/17 [History]


Amlodipine Besylate [Norvasc 10 mg Tablet] 10 mg PO DAILY 11/03/17 [History]


Cyproheptadine HCl [Periactin 4 Mg Tablet] 4 mg PO DAILY 11/03/17 [History]


Escitalopram Oxalate [Lexapro 10 mg Tablet] 10 mg PO QHS 11/03/17 [History]


Levothyroxine Sodium [Synthroid] 25 mcg PO DAILY 11/03/17 [History]


Losartan Potassium [Cozaar 50 mg Tablet] 50 mg PO DAILY 11/03/17 [History]


Metoprolol Succinate [Toprol Xl 50 mg Tab.sr] 50 mg PO DAILY@1700 11/03/17 [

History]


Metoprolol Tartrate [Lopressor 25 mg Tablet] 25 mg PO DAILY@1700 11/03/17 [

History]


Ondansetron [Zofran Odt] 4 mg PO Q6HP PRN 11/03/17 [History]


Oxycodone HCl [Oxy-Ir 5 mg Tablet] 5 mg PO Q6HP PRN 11/03/17 [History]


Oxycodone HCl [Oxycontin] 30 mg PO Q12 11/03/17 [History]


Polyethylene Glycol 3350 [Miralax Powder 17 gm/Packet] 1 packet PO DAILY 11/03/ 17 [History]


Rivaroxaban [Xarelto 10 mg Tablet] 20 mg PO DAILY 11/03/17 [History]


Tetrahydrozoline HCl [Visine 0.05% Oph Soln 15 Ml] 1 drop OU Q4HP PRN 11/03/17 [

History]











Past Medical History





- Social History


Smoking Status: Former Smoker


Chew tobacco use (# tins/day): No


Frequency of alcohol use: None


Drug Abuse: None


Family History: Reviewed & Not Pertinent





- Past Medical History


Cardiac Medical History: Reports: Hx Hypertension, Hx Pulmonary Embolism


   Denies: Hx Coronary Artery Disease, Hx Heart Attack


Pulmonary Medical History: 


   Denies: Hx Asthma, Hx Bronchitis, Hx COPD, Hx Pneumonia, Hx Tuberculosis


Neurological Medical History: Denies: Hx Cerebrovascular Accident, Hx Seizures


Endocrine Medical History: Denies: Hx Diabetes Mellitus Type 1, Hx Diabetes 

Mellitus Type 2, Hx Graves' Disease, Hx Hyperthyroidism, Hx Hypothyroidism


Renal/ Medical History: Denies: Hx Peritoneal Dialysis


GI Medical History: Denies: Hx Hepatitis


Musculoskeltal Medical History: Reports Hx Arthritis, Denies Hx Fibromyalgia, 

Denies Hx Multiple Sclerosis, Denies Hx Muscular Dystrophy, Denies Hx Muscle 

Spasm, Denies Hx Muscle Weakness, Denies Hx Musculoskeletal Deformity, Denies 

Hx Musculoskeletal Trauma


Skin Medical History: Denies Hx Cellulitis, Denies Hx Eczema, Denies Hx MRSA, 

Denies Hx Psoriasis


Psychiatric Medical History: Reports: Hx Anxiety


   Denies: Hx Attention Deficit Hyperactivity Disorder, Hx Bipolar Disorder, Hx 

Borderline Personality Disorder, Hx Depression, Hx Obsessive Compulsive Disorder

, Hx Personality Disorder, Hx Schizoaffective Disorder, Hx Schizophrenia


Traumatic Medical History: Denies: Hx Fractures, Hx Gunshot Wound, Hx Liver 

Laceration, Hx Pneumothorax, Hx Spine Fracture, Hx Spleen Laceration/Rupture, 

Hx Traumatic Brain Injury


Infectious Medical History: Denies: Hx C-Diff, Hx Hepatitis, Hx HIV, Hx MRSA, 

Hx VRE


Past Surgical History: Reports: Hx Appendectomy, Hx Orthopedic Surgery - foot; 

left hip arthroplasty.  Denies: Hx Pacemaker





- Immunizations


Immunizations up to date: Yes


Hx Diphtheria, Pertussis, Tetanus Vaccination: Yes





Review of Systems





- Review of Systems


Constitutional: No symptoms reported


EENT: No symptoms reported


Cardiovascular: Chest pain


Respiratory: No symptoms reported


Gastrointestinal: No symptoms reported


Genitourinary: No symptoms reported, Dysuria


Male Genitourinary: No symptoms reported


Musculoskeletal: Other - Shoulder pain


Skin: No symptoms reported


Hematologic/Lymphatic: No symptoms reported


Neurological/Psychological: No symptoms reported


-: Yes All other systems reviewed and negative





Physical Exam





- Vital signs


Vitals: 


 











Resp


 


 16 


 


 11/03/17 08:20











Interpretation: Tachycardic





- General


General appearance: Appears well, Alert





- HEENT


Head: Normocephalic, Atraumatic


Eyes: Normal


Pupils: PERRL





- Respiratory


Respiratory status: No respiratory distress


Chest status: Tender - Tenderness to palpation of the chest reproduces patient'

s pain


Breath sounds: Normal


Chest palpation: Normal





- Cardiovascular


Rhythm: Tachycardia


Heart sounds: Normal auscultation


Murmur: No





- Abdominal


Inspection: Normal


Distension: No distension


Bowel sounds: Normal


Tenderness: Nontender


Organomegaly: No organomegaly





- Back


Back: Normal, Nontender





- Extremities


General upper extremity: Normal inspection, Nontender


General lower extremity: Normal inspection, Nontender





- Neurological


Neuro grossly intact: Yes


Cognition: Normal


Maliha Coma Scale Eye Opening: Spontaneous


Maliha Coma Scale Verbal: Oriented


[place] Coma Scale Motor: Obeys Commands


Maliha Coma Scale Total: 15


Sensory: Normal





- Psychological


Associated symptoms: Normal affect, Normal mood





- Skin


Skin Temperature: Warm


Skin Moisture: Dry


Skin Color: Normal





Course





- Re-evaluation


Re-evalutation: 





11/03/17 15:23


Patient presents today with significant tachycardia.  No fevers were noted.  No 

resolution tachycardia with IV fluids therefore due to recent Xanax usage 

patient was given benzodiazepine with slight improvement of the patient's heart 

rate.  Upon reevaluated patient patient states he felt much better after the 

Ativan.  Patient also underwent CTA showing no acute new pulmonary embolism.  

Urinalysis does show significant signs of a urinary tract infection today 

patient does not have a Fontana however other visits patient had a chronic 

indwelling Fontana possible colonization urine culture was sent patient's case 

was discussed with hospitalist and cardiologist.  Slight elevation in troponin 

more likely due to underlying benzodiazepine withdrawal possible significant 

infection.  Will admit the patient to the hospital service.





- Vital Signs


Vital signs: 


 











Temp Pulse Resp BP Pulse Ox


 


 98.2 F   116 H  28 H  150/87 H  100 


 


 11/03/17 08:36  11/03/17 09:19  11/03/17 12:00  11/03/17 11:00  11/03/17 12:00














- Laboratory


Result Diagrams: 


 11/03/17 10:47





 11/03/17 10:47


Laboratory results interpreted by me: 


 











  11/03/17 11/03/17 11/03/17





  10:47 10:47 10:47


 


RBC  3.93 L  


 


Hgb  11.1 L  


 


Hct  32.8 L  


 


RDW  16.0 H  


 


Plt Count  622 H  


 


Seg Neutrophils %  85.1 H  


 


Lymphocytes %  7.7 L  


 


Absolute Neutrophils  8.6 H  


 


Chloride   97 L 


 


Alkaline Phosphatase   141 H 


 


Urine Blood    SMALL H


 


Urine Nitrite    POSITIVE H


 


Ur Leukocyte Esterase    LARGE H














Procedures





- Additional Procedures


  ** IV insertion


Notes: 





11/03/17 10:47


Using ultrasound vein was visualized below the right AC a 20-gauge IV was 

inserted into the vein visualized without difficulty.  Area was cleaned with 

alcohol swab.  Patient tolerated procedure well.





Critical Care Note





- Critical Care Note


Total time excluding time spent on procedures (mins): 35


Comments: 





Multiple evaluations for significant tachycardia.





Discharge





- Discharge


Clinical Impression: 


 UTI (urinary tract infection), Benzodiazepine withdrawal, DNR (do not 

resuscitate), Tachycardia





Condition: Fair


Disposition: ADMITTED AS INPATIENT


Admitting Provider: Hospitalist - Berdecia


Unit Admitted: [facility]

## 2017-11-03 NOTE — RADIOLOGY REPORT (SQ)
EXAM DESCRIPTION:  CTA CHEST



COMPLETED DATE/TIME:  11/3/2017 12:42 pm



REASON FOR STUDY:  eval for pe tachy



COMPARISON:  CT angio chest 10/6/2017, 4/24/2017, 9/26/2015, 12/6/2011



TECHNIQUE:  CT scan of the chest performed using helical scanning technique with dynamic intravenous 
contrast injection.  Images reviewed with lung, soft tissue and bone windows.  Reconstructed coronal 
and sagittal MPR images reviewed.

Additional 3 dimensional post-processing performed to develop Maximal Intensity Projection images (MI
P).  All images stored on PACS.

All CT scanners at this facility use dose modulation, iterative reconstruction, and/or weight based d
osing when appropriate to reduce radiation dose to as low as reasonably achievable (ALARA).

[facility]: Dose Right  [facility]: CareDose    [facility]: Dose Right    [facility]: Teradose 4D    [facility]: Smart Technologies



CONTRAST TYPE AND DOSE:  contrast/concentration: Isovue 370.00 mg/ml; Total Contrast Delivered: 54.0 
ml; Total Saline Delivered: 80.0 ml

Contrast bolus optimized for the pulmonary arteries. Not diagnostic for the aorta.



RENAL FUNCTION:  Creatinine 0.8



RADIATION DOSE:  Up-to-date CT equipment and radiation dose reduction techniques were employed. CTDIv
ol: 14.3 - 16.5 mGy. DLP: 564 mGy-cm. .



LIMITATIONS:  None.



FINDINGS:  LUNGS AND PLEURA: Obstructive lung disease is present.

No fluffy alveolar infiltrates worrisome for edema or pneumonia.

There is dependent atelectasis in the right and left posterior costophrenic sulci.

No pleural effusion, no pneumothorax.

AORTA AND GREAT VESSELS: No aneurysm.  Contrast bolus not optimized for the aorta.  Aberrant right padilla
bclavian artery, an anatomic variant.

HEART: No pericardial effusion. No significant coronary artery calcifications.

PULMONARY ARTERIES: No emboli visualized in the main pulmonary arteries or the segmental branches.

HILAR AND MEDIASTINAL STRUCTURES: No identified masses or abnormal nodes.

HARDWARE: None in the chest.

UPPER ABDOMEN: No significant findings.  2 cm right posterior mid pole renal cortical cyst.

THYROID AND OTHER SOFT TISSUES: No masses.  No adenopathy.

BONES: No acute or significant finding.

3D MIPS: Confirm above findings.

OTHER: Air-fluid level in the esophagus, from gastroesophageal reflux.



IMPRESSION:  Obstructive lung disease.

Bibasilar atelectasis

No CT angio evidence of acute pulmonary emboli.



COMMENT:  Quality ID # [id number]: Final reports with documentation of one or more dose reduction techniques
 (e.g., Automated exposure control, adjustment of the mA and/or kV according to patient size, use of 
iterative reconstruction technique)



TECHNICAL DOCUMENTATION:  JOB ID:  [id number]

 2011 [employer]- All Rights Reserved

## 2017-11-03 NOTE — RADIOLOGY REPORT (SQ)
EXAM DESCRIPTION:  CHEST SINGLE VIEW



COMPLETED DATE/TIME:  11/3/2017 9:32 am



REASON FOR STUDY:  left shoulder pain



COMPARISON:  CT chest 10/6/2017

Chest films 6/4/2017, 9/27/2017, 10/2/2017, 10/18/2017

Bilateral shoulder films same date



EXAM PARAMETERS:  NUMBER OF VIEWS: One view.

TECHNIQUE: Single frontal radiographic view of the chest acquired.

RADIATION DOSE: NA

LIMITATIONS: None.



FINDINGS:  LUNGS AND PLEURA: Minimal bibasilar atelectasis.

No pleural effusion.  No pneumothorax.  No dense consolidation worrisome for pneumonia.

MEDIASTINUM AND HILAR STRUCTURES: No masses.  Contour normal.

HEART AND VASCULAR STRUCTURES: Heart normal in size.  Normal vasculature.

BONES: No acute findings.

HARDWARE: None in the chest.

OTHER: No other significant finding.



IMPRESSION:  Minimal bibasilar atelectasis.



TECHNICAL DOCUMENTATION:  JOB ID:  [id number]

 2011 [employer]- All Rights Reserved

## 2017-11-03 NOTE — RADIOLOGY REPORT (SQ)
EXAM DESCRIPTION:  SHOULDER BILAT 2 OR MORE VIEWS



COMPLETED DATE/TIME:  11/3/2017 9:32 am



REASON FOR STUDY:  left shoulder pain



COMPARISON:  None.



NUMBER OF VIEWS:  Three views.



TECHNIQUE:  Internal rotation, external rotation, and Y view images acquired of the right and left sh
oulder.



LIMITATIONS:  None.



FINDINGS:  MINERALIZATION: Osteoporotic

BONES: No acute fracture or dislocation.  No worrisome bone lesions.

JOINTS:  No glenohumeral dislocation.  No widening of the acromioclavicular joint.

VISUALIZED LUNGS AND RIBS: No pneumothorax.  No rib fracture.

SOFT TISSUES: No radiopaque foreign body.

OTHER: No other significant finding.



IMPRESSION:  Osteoporotic.  No acute fracture or malalignment.



TECHNICAL DOCUMENTATION:  JOB ID:  [id number]

 2011 [employer]- All Rights Reserved

## 2017-11-04 RX ADMIN — ALPRAZOLAM SCH MG: 0.5 TABLET ORAL at 21:08

## 2017-11-04 RX ADMIN — OXYCODONE HYDROCHLORIDE PRN MG: 5 TABLET ORAL at 02:22

## 2017-11-04 RX ADMIN — TAMSULOSIN HYDROCHLORIDE SCH MG: 0.4 CAPSULE ORAL at 14:13

## 2017-11-04 RX ADMIN — AMLODIPINE BESYLATE SCH MG: 10 TABLET ORAL at 09:52

## 2017-11-04 RX ADMIN — LOSARTAN POTASSIUM SCH MG: 50 TABLET, FILM COATED ORAL at 09:52

## 2017-11-04 RX ADMIN — ALPRAZOLAM SCH MG: 0.5 TABLET ORAL at 05:15

## 2017-11-04 RX ADMIN — SODIUM CHLORIDE, SODIUM LACTATE, POTASSIUM CHLORIDE, AND CALCIUM CHLORIDE PRN ML: .6; .31; .03; .02 INJECTION, SOLUTION INTRAVENOUS at 05:26

## 2017-11-04 RX ADMIN — RIVAROXABAN SCH MG: 10 TABLET, FILM COATED ORAL at 09:51

## 2017-11-04 RX ADMIN — CEFTRIAXONE SCH ML: 1 INJECTION, SOLUTION INTRAVENOUS at 09:50

## 2017-11-04 RX ADMIN — METOPROLOL SUCCINATE SCH MG: 50 TABLET, EXTENDED RELEASE ORAL at 17:09

## 2017-11-04 RX ADMIN — ESCITALOPRAM OXALATE SCH MG: 10 TABLET, FILM COATED ORAL at 21:09

## 2017-11-04 RX ADMIN — LEVOTHYROXINE SODIUM SCH MG: 25 TABLET ORAL at 09:52

## 2017-11-04 RX ADMIN — ALPRAZOLAM SCH MG: 0.5 TABLET ORAL at 14:13

## 2017-11-04 RX ADMIN — OXYCODONE HYDROCHLORIDE PRN MG: 5 TABLET ORAL at 09:52

## 2017-11-04 NOTE — EKG REPORT
SEVERITY:- BORDERLINE ECG -

SINUS TACHYCARDIA

BORDERLINE T ABNORMALITIES, ANT-LAT LEADS

:

Confirmed by: Caitlyn Sullivan 04-Nov-2017 11:49:42

## 2017-11-04 NOTE — PDOC PROGRESS REPORT
Subjective


Progress Note for:: 11/04/17


Subjective:: 





Patient complains of pain in both shoulders and his chest. Also complains of 

having problems sleeping.





Physical Exam


Vital Signs: 


 











Temp Pulse Resp BP Pulse Ox


 


 98.8 F   113 H  18   137/75 H  100 


 


 11/04/17 03:58  11/04/17 03:58  11/04/17 03:58  11/04/17 03:58  11/04/17 03:58








 Intake & Output











 11/03/17 11/04/17 11/05/17





 06:59 06:59 05:59


 


Intake Total  1280 


 


Balance  1280 


 


Weight  62.8 kg 














Results


Laboratory Results: 


 











  11/03/17 11/04/17





  19:38 01:33


 


Troponin I  0.224  0.345











Impressions: 


 





Chest X-Ray  11/03/17 08:59


IMPRESSION:  Minimal bibasilar atelectasis.


 








Shoulder X-Ray  11/03/17 08:59


IMPRESSION:  Osteoporotic.  No acute fracture or malalignment.


 








Chest/Abdomen CTA  11/03/17 12:02


IMPRESSION:  Obstructive lung disease.


Bibasilar atelectasis


No CT angio evidence of acute pulmonary emboli.


 














Assessment & Plan





- Diagnosis


(1) Chest pain


Qualifiers: 


   Chest pain type: precordial pain   Qualified Code(s): R07.2 - Precordial 

pain   


Is this a current diagnosis for this admission?: Yes   





(2) Left shoulder pain


Qualifiers: 


   Chronicity: chronic   Qualified Code(s): M25.512 - Pain in left shoulder; 

G89.29 - Other chronic pain; G89.29 - Other chronic pain   


Is this a current diagnosis for this admission?: Yes   


Plan: 


Appears to be arthritic. Pain management.








(3) Tachycardia


Is this a current diagnosis for this admission?: Yes   


Plan: 


Improved since admission.








(4) UTI (urinary tract infection)


Qualifiers: 


   Urinary tract infection type: acute cystitis   Hematuria presence: with 

hematuria   Qualified Code(s): N30.01 - Acute cystitis with hematuria   


Is this a current diagnosis for this admission?: Yes   


Plan: 


Continue rocephin iv. Culture still pending. Add flomax due to history of BPH. 

Track urine culture result.








(5) Pulmonary emboli


Qualifiers: 


   Pulmonary embolism type: other 


Is this a current diagnosis for this admission?: Yes   


Plan: 


Continue xarelto as outpatient








(6) NSTEMI (non-ST elevated myocardial infarction)


Is this a current diagnosis for this admission?: Yes   


Plan: 


May be due to myocardial demand vs true coranry artery disease. To place on 

aspirin, low dose imdur since tachycardia improved. Order ECHO. Daughter did 

not want any aggressive measures when first approached in ED.








(7) Chronic pain


Is this a current diagnosis for this admission?: Yes   


Plan: 


Continue outpatient regimen and try low dose fentanyl patch.








- Time


Time Spent with patient: 15-24 minutes


Medications reviewed and adjusted accordingly: Yes


Anticipated discharge: SNF


Within: when bed available





- Inpatient Certification


Based on my medical assessment, after consideration of the patient's 

comorbidities, presenting symptoms, or acuity I expect that the services needed 

warrant INPATIENT care.: Yes


I certify that my determination is in accordance with my understanding of 

Medicare's requirements for reasonable and necessary INPATIENT services [42 CFR 

412.3e].: Yes


Medical Necessity: Need For IV Fluids, Need for Pain Control, Need for IV 

Antibiotics

## 2017-11-04 NOTE — EKG REPORT
SEVERITY:- OTHERWISE NORMAL ECG -

SINUS TACHYCARDIA

:

Confirmed by: Caitlyn Sullivan 04-Nov-2017 11:49:47

## 2017-11-05 RX ADMIN — LOSARTAN POTASSIUM SCH MG: 50 TABLET, FILM COATED ORAL at 09:16

## 2017-11-05 RX ADMIN — ALPRAZOLAM SCH MG: 0.5 TABLET ORAL at 06:37

## 2017-11-05 RX ADMIN — LEVOTHYROXINE SODIUM SCH MG: 25 TABLET ORAL at 09:16

## 2017-11-05 RX ADMIN — OXYCODONE HYDROCHLORIDE PRN MG: 5 TABLET ORAL at 14:40

## 2017-11-05 RX ADMIN — OXYCODONE HYDROCHLORIDE PRN MG: 5 TABLET ORAL at 22:19

## 2017-11-05 RX ADMIN — AMLODIPINE BESYLATE SCH MG: 10 TABLET ORAL at 09:15

## 2017-11-05 RX ADMIN — LANSOPRAZOLE SCH MG: 30 TABLET, ORALLY DISINTEGRATING, DELAYED RELEASE ORAL at 06:37

## 2017-11-05 RX ADMIN — ESCITALOPRAM OXALATE SCH MG: 10 TABLET, FILM COATED ORAL at 22:18

## 2017-11-05 RX ADMIN — METOPROLOL SUCCINATE SCH MG: 50 TABLET, EXTENDED RELEASE ORAL at 16:52

## 2017-11-05 RX ADMIN — RIVAROXABAN SCH MG: 10 TABLET, FILM COATED ORAL at 09:15

## 2017-11-05 RX ADMIN — ALPRAZOLAM SCH MG: 0.5 TABLET ORAL at 14:23

## 2017-11-05 RX ADMIN — OXYCODONE HYDROCHLORIDE PRN MG: 5 TABLET ORAL at 08:22

## 2017-11-05 RX ADMIN — CEFTRIAXONE SCH ML: 1 INJECTION, SOLUTION INTRAVENOUS at 09:16

## 2017-11-05 RX ADMIN — ALPRAZOLAM SCH MG: 0.5 TABLET ORAL at 22:19

## 2017-11-05 NOTE — PDOC PROGRESS REPORT
Subjective


Progress Note for:: 11/05/17


Subjective:: 





Patient complains of pain in both shoulders and his chest however found 

sleeping.





Physical Exam


Vital Signs: 


 











Temp Pulse Resp BP Pulse Ox


 


 98.0 F   103 H  18   135/84 H  100 


 


 11/05/17 05:07  11/05/17 05:07  11/05/17 05:07  11/05/17 05:07  11/05/17 05:07








 Intake & Output











 11/04/17 11/05/17 11/06/17





 07:59 06:59 06:59


 


Intake Total   


 


Balance   











General appearance: PRESENT: no acute distress, cooperative, thin


Head exam: PRESENT: atraumatic, normocephalic


Eye exam: PRESENT: EOMI, PERRLA


Mouth exam: PRESENT: moist, neck supple


Neck exam: PRESENT: tenderness, other.  ABSENT: JVD


Respiratory exam: PRESENT: clear to auscultation alexandra


Cardiovascular exam: PRESENT: gallop, tachycardia.  ABSENT: diastolic murmur, 

systolic murmur


Vascular exam: PRESENT: normal capillary refill


GI/Abdominal exam: PRESENT: normal bowel sounds, soft.  ABSENT: guarding, 

tenderness


Musculoskeletal exam: PRESENT: ambulatory, tenderness.  ABSENT: full ROM


Neurological exam: PRESENT: alert, oriented to person, oriented to time, 

oriented to situation


Skin exam: PRESENT: intact, normal color





Results


Impressions: 


 





Chest X-Ray  11/03/17 08:59


IMPRESSION:  Minimal bibasilar atelectasis.


 








Shoulder X-Ray  11/03/17 08:59


IMPRESSION:  Osteoporotic.  No acute fracture or malalignment.


 








Chest/Abdomen CTA  11/03/17 12:02


IMPRESSION:  Obstructive lung disease.


Bibasilar atelectasis


No CT angio evidence of acute pulmonary emboli.


 














Assessment & Plan





- Diagnosis


(1) Chest pain


Qualifiers: 


   Chest pain type: precordial pain   Qualified Code(s): R07.2 - Precordial 

pain   


Is this a current diagnosis for this admission?: Yes   


Plan: 


Pain management. For ECHO in AM. Conservative management.








(2) Left shoulder pain


Qualifiers: 


   Chronicity: chronic   Qualified Code(s): M25.512 - Pain in left shoulder; 

G89.29 - Other chronic pain; G89.29 - Other chronic pain   


Is this a current diagnosis for this admission?: Yes   


Plan: 


Appears to be arthritic. Pain management.








(3) Tachycardia


Is this a current diagnosis for this admission?: Yes   


Plan: 


Will discontinue imdur. TSH ordered and normal. Discontinue ivf's.








(4) UTI (urinary tract infection)


Qualifiers: 


   Urinary tract infection type: acute cystitis   Hematuria presence: with 

hematuria   Qualified Code(s): N30.01 - Acute cystitis with hematuria   


Is this a current diagnosis for this admission?: Yes   


Plan: 


Continue rocephin iv. Due to E coli. Blood culture gpc x 1 appears to be 

contaminant








(5) Pulmonary emboli


Qualifiers: 


   Pulmonary embolism type: other 


Is this a current diagnosis for this admission?: No   


Plan: 


Continue xarelto as outpatient








(6) NSTEMI (non-ST elevated myocardial infarction)


Is this a current diagnosis for this admission?: Yes   


Plan: 


May be due to myocardial demand vs true coranry artery disease. Continue 

aspirin. Discontinue low dose imdur due to tachycardia. ECHO ordered.. Daughter 

did not want any aggressive measures when first approached in ED.








(7) Chronic pain


Is this a current diagnosis for this admission?: Yes   


Plan: 


Decrease oxycontin and reorder fentanyl patch.








(8) Weakness


Is this a current diagnosis for this admission?: Yes   


Plan: 


Order PT








- Time


Time Spent with patient: 15-24 minutes


Medications reviewed and adjusted accordingly: Yes


Anticipated discharge: SNF


Within: within 48 hours





- Inpatient Certification


Based on my medical assessment, after consideration of the patient's 

comorbidities, presenting symptoms, or acuity I expect that the services needed 

warrant INPATIENT care.: Yes


I certify that my determination is in accordance with my understanding of 

Medicare's requirements for reasonable and necessary INPATIENT services [42 CFR 

412.3e].: Yes


Medical Necessity: Need Close Monitoring Due to Risk of Patient Decompensation, 

Need for Pain Control, Need for IV Antibiotics

## 2017-11-06 LAB
ANION GAP SERPL CALC-SCNC: 11 MMOL/L (ref 5–19)
BASOPHILS # BLD AUTO: 0 10^3/UL (ref 0–0.2)
BASOPHILS NFR BLD AUTO: 0.4 % (ref 0–2)
BUN SERPL-MCNC: 7 MG/DL (ref 7–20)
CALCIUM: 8.8 MG/DL (ref 8.4–10.2)
CHLORIDE SERPL-SCNC: 103 MMOL/L (ref 98–107)
CO2 SERPL-SCNC: 24 MMOL/L (ref 22–30)
CREAT SERPL-MCNC: 0.74 MG/DL (ref 0.52–1.25)
EOSINOPHIL # BLD AUTO: 0.1 10^3/UL (ref 0–0.6)
EOSINOPHIL NFR BLD AUTO: 1.8 % (ref 0–6)
ERYTHROCYTE [DISTWIDTH] IN BLOOD BY AUTOMATED COUNT: 16.4 % (ref 11.5–14)
GLUCOSE SERPL-MCNC: 119 MG/DL (ref 75–110)
HCT VFR BLD CALC: 27.7 % (ref 37.9–51)
HGB BLD-MCNC: 9.6 G/DL (ref 13.5–17)
HGB HCT DIFFERENCE: 1.1
LYMPHOCYTES # BLD AUTO: 1.1 10^3/UL (ref 0.5–4.7)
LYMPHOCYTES NFR BLD AUTO: 19.9 % (ref 13–45)
MAGNESIUM SERPL-MCNC: 1.5 MG/DL (ref 1.6–2.3)
MCH RBC QN AUTO: 28.8 PG (ref 27–33.4)
MCHC RBC AUTO-ENTMCNC: 34.8 G/DL (ref 32–36)
MCV RBC AUTO: 83 FL (ref 80–97)
MONOCYTES # BLD AUTO: 0.7 10^3/UL (ref 0.1–1.4)
MONOCYTES NFR BLD AUTO: 12.5 % (ref 3–13)
NEUTROPHILS # BLD AUTO: 3.7 10^3/UL (ref 1.7–8.2)
NEUTS SEG NFR BLD AUTO: 65.4 % (ref 42–78)
POTASSIUM SERPL-SCNC: 3.3 MMOL/L (ref 3.6–5)
RBC # BLD AUTO: 3.35 10^6/UL (ref 4.35–5.55)
SODIUM SERPL-SCNC: 138.1 MMOL/L (ref 137–145)
WBC # BLD AUTO: 5.7 10^3/UL (ref 4–10.5)

## 2017-11-06 RX ADMIN — LOSARTAN POTASSIUM SCH MG: 50 TABLET, FILM COATED ORAL at 10:34

## 2017-11-06 RX ADMIN — TAMSULOSIN HYDROCHLORIDE SCH MG: 0.4 CAPSULE ORAL at 17:57

## 2017-11-06 RX ADMIN — ALPRAZOLAM SCH MG: 0.5 TABLET ORAL at 21:49

## 2017-11-06 RX ADMIN — AMLODIPINE BESYLATE SCH MG: 10 TABLET ORAL at 10:34

## 2017-11-06 RX ADMIN — LANSOPRAZOLE SCH MG: 30 TABLET, ORALLY DISINTEGRATING, DELAYED RELEASE ORAL at 06:26

## 2017-11-06 RX ADMIN — RIVAROXABAN SCH MG: 10 TABLET, FILM COATED ORAL at 10:35

## 2017-11-06 RX ADMIN — OXYCODONE HYDROCHLORIDE PRN MG: 5 TABLET ORAL at 13:24

## 2017-11-06 RX ADMIN — LEVOTHYROXINE SODIUM SCH MG: 25 TABLET ORAL at 10:35

## 2017-11-06 RX ADMIN — OXYCODONE HYDROCHLORIDE PRN MG: 5 TABLET ORAL at 21:49

## 2017-11-06 RX ADMIN — CEFTRIAXONE SCH ML: 1 INJECTION, SOLUTION INTRAVENOUS at 10:35

## 2017-11-06 RX ADMIN — ALPRAZOLAM SCH MG: 0.5 TABLET ORAL at 13:20

## 2017-11-06 RX ADMIN — ESCITALOPRAM OXALATE SCH MG: 10 TABLET, FILM COATED ORAL at 21:50

## 2017-11-06 RX ADMIN — OXYCODONE HYDROCHLORIDE PRN MG: 5 TABLET ORAL at 06:26

## 2017-11-06 RX ADMIN — ALPRAZOLAM SCH MG: 0.5 TABLET ORAL at 06:25

## 2017-11-06 RX ADMIN — METOPROLOL SUCCINATE SCH MG: 50 TABLET, EXTENDED RELEASE ORAL at 16:58

## 2017-11-06 NOTE — XCELERA REPORT
[address] [address]

                             Tel: [phone]

                             Fax: [phone]



                    Transthoracic Echocardiogram Report

____________________________________________________________________________



Name: RM MCBRIDE

MRN: [id number]                Age: 82 yrs

Gender: Male                   : 1935

Patient Status: Inpatient      Patient Location: [address] [place] [address]

Account #: [id number]

Study Date: 2017 10:43 AM

Accession #: [id number]

____________________________________________________________________________



Height: 72 in        Weight: 138 lb        BSA: 1.8 m2



____________________________________________________________________________

Procedure: A two-dimensional transthoracic echocardiogram with color flow

and Doppler was performed. Study Quality: Fair.

Reason For Study: NSTEMI



History: NSTEMI.

Ordering Physician: ROCKY SOTO

Performed By: Ghada Diaz

____________________________________________________________________________





Interpretation Summary

The left ventricle is normal in size.

There is borderline asymmetric left ventricular hypertrophy.

LV EF is 60%

The right ventricle is grossly normal size.

The left atrial size is normal.

There is no evidence of mitral valve prolapse.

There is no mitral valve stenosis.

There is no aortic valve stenosis

There is no LVOT obstruction.

No aortic regurgitation is present.

There is no tricuspid stenosis.

There is a trace amount of tricuspid regurgitation

There is mild pulmonary hypertension by echo

RVSP is 32 tpo 37 mm of Hg , with RA mean of 5 to 10.

There is no pulmonic valvular stenosis.

There is a trace amount of pulmonic regurgitation

The aortic root is mildly dilated

There is no pericardial effusion.

Doppler measurements suggest impaired left ventricular relaxation, which is

associated with grade I/IV or mild diastolic dysfunction

Left ventricular systolic function is normal.

The left ventricular wall motion is normal.

There is no thrombus.



____________________________________________________________________________



MMode/2D Measurements & Calculations

RVDd: 2.7 cm  LVIDd: 4.0 cm FS: 31.5 %            Ao root diam: 3.8 cm

IVSd: 1.2 cm  LVIDs: 2.7 cm EDV(Teich): 70.4 ml

              LVPWd: 1.1 cm ESV(Teich): 28.2 ml   Ao root area: 11.4 cm2

                            EF(Teich): 59.9 %



Doppler Measurements & Calculations

MV E max jhony:      MV dec slope:       Ao V2 max:        LV V1 max P.3 cm/sec                            144.2 cm/sec      5.4 mmHg

MV A max jhony:      505.0 cm/sec2       Ao max PG:        LV V1 max:

103.8 cm/sec       MV dec time:        8.3 mmHg          116.5 cm/sec

MV E/A: 0.63       0.13 sec



        _____________________________________________________________

PA V2 max:         PI end-d jhony:       TR max jhony:

78.3 cm/sec        83.8 cm/sec         247.3 cm/sec

PA max P.5 mmHg                    TR max P.6 mmHg



____________________________________________________________________________

Left Ventricle

The left ventricle is normal in size. There is borderline asymmetric left

ventricular hypertrophy. LV EF is 60%. Left ventricular systolic function

is normal. Doppler measurements suggest impaired left ventricular

relaxation, which is associated with grade I/IV or mild diastolic

dysfunction. The left ventricular wall motion is normal. There is no

thrombus.



Right Ventricle

The right ventricle is grossly normal size.



Atria

The right atrium is normal. The left atrial size is normal.





Mitral Valve

There is no evidence of mitral valve prolapse. There is no vegetation seen

on the mitral valve. There is no mitral valve stenosis. There is a trace to

mild amount of mitral regurgitation.



Aortic Valve

There is no aortic valvular vegetation. There is no aortic valve stenosis.

There is no LVOT obstruction. No aortic regurgitation is present.



Tricuspid Valve

There is no tricuspid stenosis. There is mild pulmonary hypertension by

echo. There is a trace amount of tricuspid regurgitation. RVSP is 32 tpo 37

mm of Hg , with RA mean of 5 to 10.



Pulmonic Valve

There is no pulmonic valvular stenosis. There is a trace amount of pulmonic

regurgitation.



Great Vessels

The aortic root is mildly dilated.





Effusions

There is no pericardial effusion.



____________________________________________________________________________



Electronically signed by:      Jessica Guajardo      on 2017 05:11

PM



CC: ROCKY SOTO

>

Jessica Guajardo

## 2017-11-07 RX ADMIN — METOPROLOL SUCCINATE SCH MG: 50 TABLET, EXTENDED RELEASE ORAL at 17:07

## 2017-11-07 RX ADMIN — OXYCODONE HYDROCHLORIDE PRN MG: 5 TABLET ORAL at 05:21

## 2017-11-07 RX ADMIN — OXYCODONE HYDROCHLORIDE PRN MG: 5 TABLET ORAL at 15:17

## 2017-11-07 RX ADMIN — RIVAROXABAN SCH MG: 10 TABLET, FILM COATED ORAL at 09:04

## 2017-11-07 RX ADMIN — LOSARTAN POTASSIUM SCH MG: 50 TABLET, FILM COATED ORAL at 09:04

## 2017-11-07 RX ADMIN — ESCITALOPRAM OXALATE SCH MG: 10 TABLET, FILM COATED ORAL at 21:34

## 2017-11-07 RX ADMIN — ALPRAZOLAM SCH MG: 0.5 TABLET ORAL at 14:33

## 2017-11-07 RX ADMIN — OXYCODONE HYDROCHLORIDE PRN MG: 5 TABLET ORAL at 23:48

## 2017-11-07 RX ADMIN — AMLODIPINE BESYLATE SCH MG: 10 TABLET ORAL at 09:04

## 2017-11-07 RX ADMIN — ALPRAZOLAM SCH MG: 0.5 TABLET ORAL at 21:33

## 2017-11-07 RX ADMIN — LEVOTHYROXINE SODIUM SCH MG: 25 TABLET ORAL at 09:04

## 2017-11-07 RX ADMIN — LANSOPRAZOLE SCH MG: 30 TABLET, ORALLY DISINTEGRATING, DELAYED RELEASE ORAL at 05:20

## 2017-11-07 RX ADMIN — CEFTRIAXONE SCH ML: 1 INJECTION, SOLUTION INTRAVENOUS at 09:04

## 2017-11-07 RX ADMIN — ALPRAZOLAM SCH MG: 0.5 TABLET ORAL at 05:30

## 2017-11-07 RX ADMIN — TAMSULOSIN HYDROCHLORIDE SCH MG: 0.4 CAPSULE ORAL at 17:07

## 2017-11-07 NOTE — PDOC PROGRESS REPORT
Subjective


Progress Note for:: 11/07/17


Subjective:: 





Patient relates that the pain in his shoulders and chest is better





Physical Exam


Vital Signs: 


 











Temp Pulse Resp BP Pulse Ox


 


 97.8 F   85   16   130/72 H  98 


 


 11/06/17 16:00  11/07/17 02:00  11/06/17 16:00  11/06/17 16:00  11/06/17 16:00








 Intake & Output











 11/06/17 11/07/17 11/08/17





 06:59 06:59 06:59


 


Intake Total 619 280 


 


Output Total 1 0 


 


Balance 618 280 


 


Weight 65.7 kg 65.3 kg 














Results


Laboratory Results: 


 





 11/06/17 07:09 





 11/06/17 07:09 





 











  11/06/17 11/06/17





  07:09 07:09


 


WBC  5.7 


 


RBC  3.35 L 


 


Hgb  9.6 L 


 


Hct  27.7 L 


 


MCV  83 


 


MCH  28.8 


 


MCHC  34.8 


 


RDW  16.4 H 


 


Plt Count  504 H 


 


Seg Neutrophils %  65.4 


 


Lymphocytes %  19.9 


 


Monocytes %  12.5 


 


Eosinophils %  1.8 


 


Basophils %  0.4 


 


Absolute Neutrophils  3.7 


 


Absolute Lymphocytes  1.1 


 


Absolute Monocytes  0.7 


 


Absolute Eosinophils  0.1 


 


Absolute Basophils  0.0 


 


Sodium   138.1


 


Potassium   3.3 L


 


Chloride   103


 


Carbon Dioxide   24


 


Anion Gap   11


 


BUN   7


 


Creatinine   0.74


 


Est GFR ( Amer)   > 60


 


Est GFR (Non-Af Amer)   > 60


 


Glucose   119 H


 


Calcium   8.8


 


Magnesium   1.5 L











Impressions: 


 





Chest X-Ray  11/03/17 08:59


IMPRESSION:  Minimal bibasilar atelectasis.


 








Shoulder X-Ray  11/03/17 08:59


IMPRESSION:  Osteoporotic.  No acute fracture or malalignment.


 








Chest/Abdomen CTA  11/03/17 12:02


IMPRESSION:  Obstructive lung disease.


Bibasilar atelectasis


No CT angio evidence of acute pulmonary emboli.


 














Assessment & Plan





- Diagnosis


(1) Chest pain


Qualifiers: 


   Chest pain type: precordial pain   Qualified Code(s): R07.2 - Precordial 

pain   


Is this a current diagnosis for this admission?: Yes   





(2) Left shoulder pain


Qualifiers: 


   Chronicity: chronic   Qualified Code(s): M25.512 - Pain in left shoulder; 

G89.29 - Other chronic pain; G89.29 - Other chronic pain   


Is this a current diagnosis for this admission?: Yes   


Plan: 


Appears to be arthritic. Continue pain management.








(3) Tachycardia


Is this a current diagnosis for this admission?: Yes   


Plan: 


Resolved and due to septicemia.








(4) UTI (urinary tract infection)


Qualifiers: 


   Urinary tract infection type: acute cystitis   Hematuria presence: with 

hematuria   Qualified Code(s): N30.01 - Acute cystitis with hematuria   


Is this a current diagnosis for this admission?: Yes   


Plan: 


Discontinue rocephin iv. Due to E coli and Enterococcus. Repeat urine I & O and 

check what is remaining in the urine.








(5) Pulmonary emboli


Qualifiers: 


   Pulmonary embolism type: other 


Is this a current diagnosis for this admission?: No   


Plan: 


Continue xarelto as outpatient








(6) NSTEMI (non-ST elevated myocardial infarction)


Is this a current diagnosis for this admission?: Yes   


Plan: 


May be due to myocardial demand vs true coranry artery disease. Continue 

aspirin. ECHO results noted








(7) Chronic pain


Is this a current diagnosis for this admission?: Yes   


Plan: 


Continue current fentanyl dose. Consulted palliative care








(8) Weakness


Is this a current diagnosis for this admission?: Yes   


Plan: 


Continue PT. He is very stiff








(9) Septicemia


Is this a current diagnosis for this admission?: Yes   


Plan: 


Blood culture growing staph epidermidis. Resistant to multiple antibiotics. 

Repeat blood cultures and start vancomycin.








- Time


Time Spent with patient: 15-24 minutes


Medications reviewed and adjusted accordingly: Yes


Anticipated discharge: SNF





- Inpatient Certification


Based on my medical assessment, after consideration of the patient's 

comorbidities, presenting symptoms, or acuity I expect that the services needed 

warrant INPATIENT care.: Yes


I certify that my determination is in accordance with my understanding of 

Medicare's requirements for reasonable and necessary INPATIENT services [42 CFR 

412.3e].: Yes


Medical Necessity: Need for Pain Control, Need for IV Antibiotics

## 2017-11-08 LAB
ANION GAP SERPL CALC-SCNC: 11 MMOL/L (ref 5–19)
BUN SERPL-MCNC: 7 MG/DL (ref 7–20)
CALCIUM: 9.2 MG/DL (ref 8.4–10.2)
CHLORIDE SERPL-SCNC: 102 MMOL/L (ref 98–107)
CO2 SERPL-SCNC: 25 MMOL/L (ref 22–30)
CREAT SERPL-MCNC: 0.74 MG/DL (ref 0.52–1.25)
GLUCOSE SERPL-MCNC: 98 MG/DL (ref 75–110)
MAGNESIUM SERPL-MCNC: 1.6 MG/DL (ref 1.6–2.3)
POTASSIUM SERPL-SCNC: 3.6 MMOL/L (ref 3.6–5)
SODIUM SERPL-SCNC: 138.2 MMOL/L (ref 137–145)

## 2017-11-08 RX ADMIN — LEVOTHYROXINE SODIUM SCH MG: 25 TABLET ORAL at 10:21

## 2017-11-08 RX ADMIN — ALPRAZOLAM SCH MG: 0.5 TABLET ORAL at 05:21

## 2017-11-08 RX ADMIN — RIVAROXABAN SCH MG: 10 TABLET, FILM COATED ORAL at 10:21

## 2017-11-08 RX ADMIN — AMLODIPINE BESYLATE SCH MG: 10 TABLET ORAL at 10:21

## 2017-11-08 RX ADMIN — ALPRAZOLAM SCH MG: 0.5 TABLET ORAL at 21:04

## 2017-11-08 RX ADMIN — OXYCODONE HYDROCHLORIDE PRN MG: 5 TABLET ORAL at 07:44

## 2017-11-08 RX ADMIN — TIZANIDINE SCH MG: 4 TABLET ORAL at 21:04

## 2017-11-08 RX ADMIN — GABAPENTIN SCH MG: 100 CAPSULE ORAL at 21:04

## 2017-11-08 RX ADMIN — VANCOMYCIN HYDROCHLORIDE SCH MG: 1 INJECTION, POWDER, LYOPHILIZED, FOR SOLUTION INTRAVENOUS at 05:30

## 2017-11-08 RX ADMIN — METOPROLOL SUCCINATE SCH MG: 50 TABLET, EXTENDED RELEASE ORAL at 18:04

## 2017-11-08 RX ADMIN — VANCOMYCIN HYDROCHLORIDE SCH MG: 1 INJECTION, POWDER, LYOPHILIZED, FOR SOLUTION INTRAVENOUS at 20:33

## 2017-11-08 RX ADMIN — ESCITALOPRAM OXALATE SCH MG: 10 TABLET, FILM COATED ORAL at 21:04

## 2017-11-08 RX ADMIN — TAMSULOSIN HYDROCHLORIDE SCH MG: 0.4 CAPSULE ORAL at 19:37

## 2017-11-08 RX ADMIN — ALPRAZOLAM SCH MG: 0.5 TABLET ORAL at 13:02

## 2017-11-08 RX ADMIN — LOSARTAN POTASSIUM SCH MG: 50 TABLET, FILM COATED ORAL at 10:21

## 2017-11-08 RX ADMIN — MORPHINE SULFATE PRN MG: 15 TABLET ORAL at 23:17

## 2017-11-08 RX ADMIN — LANSOPRAZOLE SCH MG: 30 TABLET, ORALLY DISINTEGRATING, DELAYED RELEASE ORAL at 05:22

## 2017-11-08 NOTE — PDOC PROGRESS REPORT
Subjective


Progress Note for:: 11/08/17


Subjective:: 





Complains of pain all over his chest and shoulders. Had been recently medicated 

by nurse. Brother came in and updated him regarding his condition.





ROS


All organ systems evaluated and negative except for as discussed in subjective





All laboratories and significant laboratories reviewed





Physical Exam


Vital Signs: 


 











Temp Pulse Resp BP Pulse Ox


 


 98.9 F   82   16   113/64   96 


 


 11/08/17 00:28  11/08/17 02:00  11/08/17 00:28  11/08/17 00:28  11/08/17 00:28








 Intake & Output











 11/07/17 11/08/17 11/09/17





 06:59 06:59 06:59


 


Intake Total 280 1284 


 


Output Total 0  


 


Balance 280 1284 


 


Weight 65.3 kg 65.9 kg 














Results


Laboratory Results: 


 





 11/06/17 07:09 





 11/08/17 05:12 





 











  11/08/17





  05:12


 


Sodium  138.2


 


Potassium  3.6


 


Chloride  102


 


Carbon Dioxide  25


 


Anion Gap  11


 


BUN  7


 


Creatinine  0.74


 


Est GFR ( Amer)  > 60


 


Est GFR (Non-Af Amer)  > 60


 


Glucose  98


 


Calcium  9.2


 


Magnesium  1.6











Impressions: 


 





Chest X-Ray  11/03/17 08:59


IMPRESSION:  Minimal bibasilar atelectasis.


 








Shoulder X-Ray  11/03/17 08:59


IMPRESSION:  Osteoporotic.  No acute fracture or malalignment.


 








Chest/Abdomen CTA  11/03/17 12:02


IMPRESSION:  Obstructive lung disease.


Bibasilar atelectasis


No CT angio evidence of acute pulmonary emboli.


 














Assessment & Plan





- Diagnosis


(1) Chest pain


Qualifiers: 


   Chest pain type: precordial pain   Qualified Code(s): R07.2 - Precordial 

pain   


Is this a current diagnosis for this admission?: Yes   


Plan: 


Awaiting palliative care cosnult. Increase fentanyl patch dose., discontinue 

oxycodone and order morphine IR.








(2) Left shoulder pain


Qualifiers: 


   Chronicity: chronic   Qualified Code(s): M25.512 - Pain in left shoulder; 

G89.29 - Other chronic pain; G89.29 - Other chronic pain   


Is this a current diagnosis for this admission?: Yes   





(3) Tachycardia


Is this a current diagnosis for this admission?: Yes   


Plan: 


Resolved and due to septicemia.








(4) UTI (urinary tract infection)


Qualifiers: 


   Urinary tract infection type: acute cystitis   Hematuria presence: with 

hematuria   Qualified Code(s): N30.01 - Acute cystitis with hematuria   


Is this a current diagnosis for this admission?: Yes   


Plan: 


Discontinue rocephin iv. Due to E coli and Enterococcus. Repeated urine I & O 

and check what is remaining in the urine.








(5) Pulmonary emboli


Qualifiers: 


   Pulmonary embolism type: other 


Is this a current diagnosis for this admission?: No   


Plan: 


Continue xarelto as outpatient








(6) NSTEMI (non-ST elevated myocardial infarction)


Is this a current diagnosis for this admission?: Yes   


Plan: 


May be due to myocardial demand vs true coranry artery disease. Continue 

aspirin. ECHO results noted








(7) Chronic pain


Is this a current diagnosis for this admission?: Yes   


Plan: 


Increase fentanyl dose, morphine IR and gabapentin. Add muscle relaxer 

cautiously since geriatric. Consulted palliative care.








(8) Weakness


Is this a current diagnosis for this admission?: Yes   


Plan: 


Continue PT. Patient had been bedridden for a long time and difficult to 

mobilize








(9) Septicemia


Is this a current diagnosis for this admission?: Yes   


Plan: 


Blood culture growing staph epidermidis. Resistant to multiple antibiotics.  

Blood cultures repeated.








- Time


Time Spent with patient: 15-24 minutes


Medications reviewed and adjusted accordingly: Yes


Anticipated discharge: SNF


Within: when bed available

## 2017-11-09 LAB
CREAT SERPL-MCNC: 0.9 MG/DL (ref 0.52–1.25)
TROUGH DRAW TIME: 554

## 2017-11-09 RX ADMIN — MORPHINE SULFATE SCH MG: 30 TABLET, EXTENDED RELEASE ORAL at 21:56

## 2017-11-09 RX ADMIN — TIZANIDINE SCH MG: 4 TABLET ORAL at 09:05

## 2017-11-09 RX ADMIN — ALPRAZOLAM SCH MG: 0.5 TABLET ORAL at 13:43

## 2017-11-09 RX ADMIN — LEVOTHYROXINE SODIUM SCH MG: 25 TABLET ORAL at 09:05

## 2017-11-09 RX ADMIN — LOSARTAN POTASSIUM SCH MG: 50 TABLET, FILM COATED ORAL at 09:05

## 2017-11-09 RX ADMIN — TIZANIDINE SCH MG: 4 TABLET ORAL at 21:56

## 2017-11-09 RX ADMIN — GABAPENTIN SCH MG: 100 CAPSULE ORAL at 13:43

## 2017-11-09 RX ADMIN — MORPHINE SULFATE PRN MG: 15 TABLET ORAL at 17:48

## 2017-11-09 RX ADMIN — MORPHINE SULFATE PRN MG: 15 TABLET ORAL at 05:23

## 2017-11-09 RX ADMIN — GABAPENTIN SCH MG: 100 CAPSULE ORAL at 05:23

## 2017-11-09 RX ADMIN — TAMSULOSIN HYDROCHLORIDE SCH MG: 0.4 CAPSULE ORAL at 17:03

## 2017-11-09 RX ADMIN — RIVAROXABAN SCH MG: 10 TABLET, FILM COATED ORAL at 09:05

## 2017-11-09 RX ADMIN — LANSOPRAZOLE SCH MG: 30 TABLET, ORALLY DISINTEGRATING, DELAYED RELEASE ORAL at 05:23

## 2017-11-09 RX ADMIN — VANCOMYCIN HYDROCHLORIDE SCH MG: 1 INJECTION, POWDER, LYOPHILIZED, FOR SOLUTION INTRAVENOUS at 17:03

## 2017-11-09 RX ADMIN — ALPRAZOLAM SCH MG: 0.5 TABLET ORAL at 05:23

## 2017-11-09 RX ADMIN — AMLODIPINE BESYLATE SCH MG: 10 TABLET ORAL at 09:06

## 2017-11-09 RX ADMIN — METOPROLOL SUCCINATE SCH MG: 50 TABLET, EXTENDED RELEASE ORAL at 17:03

## 2017-11-09 RX ADMIN — ESCITALOPRAM OXALATE SCH MG: 10 TABLET, FILM COATED ORAL at 21:56

## 2017-11-09 RX ADMIN — ALPRAZOLAM SCH MG: 0.5 TABLET ORAL at 21:56

## 2017-11-09 RX ADMIN — VANCOMYCIN HYDROCHLORIDE SCH MG: 1 INJECTION, POWDER, LYOPHILIZED, FOR SOLUTION INTRAVENOUS at 05:57

## 2017-11-09 RX ADMIN — GABAPENTIN SCH MG: 100 CAPSULE ORAL at 21:55

## 2017-11-09 NOTE — PDOC PROGRESS REPORT
Subjective


Progress Note for:: 11/09/17


Subjective:: 





Patient at present time is sedated. Had breakfast earlier.





ROS


All organ systems evaluated and negative except for as discussed in subjective





All laboratories and significant laboratories reviewed





Physical Exam


Vital Signs: 


 











Temp Pulse Resp BP Pulse Ox


 


 97.5 F   102 H  14   124/73   100 


 


 11/09/17 04:21  11/09/17 07:00  11/09/17 04:21  11/09/17 04:21  11/09/17 04:21








 Intake & Output











 11/08/17 11/09/17 11/10/17





 06:59 06:59 06:59


 


Intake Total 1284 1240 


 


Balance 1284 1240 


 


Weight 65.9 kg 69.7 kg 











General appearance: PRESENT: no acute distress, thin


Head exam: PRESENT: atraumatic, normocephalic


Eye exam: PRESENT: EOMI, PERRLA


Ear exam: PRESENT: normal external ear exam


Mouth exam: PRESENT: moist, neck supple


Neck exam: PRESENT: tenderness.  ABSENT: JVD


Respiratory exam: PRESENT: clear to auscultation alexandra


Cardiovascular exam: PRESENT: RRR.  ABSENT: diastolic murmur, systolic murmur


Vascular exam: PRESENT: normal capillary refill


GI/Abdominal exam: PRESENT: normal bowel sounds, soft.  ABSENT: tenderness


Neurological exam: PRESENT: other - sedated





Results


Laboratory Results: 


 





 11/06/17 07:09 








Impressions: 


 





Chest X-Ray  11/03/17 08:59


IMPRESSION:  Minimal bibasilar atelectasis.


 








Shoulder X-Ray  11/03/17 08:59


IMPRESSION:  Osteoporotic.  No acute fracture or malalignment.


 








Chest/Abdomen CTA  11/03/17 12:02


IMPRESSION:  Obstructive lung disease.


Bibasilar atelectasis


No CT angio evidence of acute pulmonary emboli.


 














Assessment & Plan





- Diagnosis


(1) Chest pain


Qualifiers: 


   Chest pain type: unspecified   Qualified Code(s): R07.9 - Chest pain, 

unspecified   


Is this a current diagnosis for this admission?: Yes   


Plan: 


Musculoskeletal in nature. Will need to continue adjusting pain management 

regimen. 








(2) Left shoulder pain


Qualifiers: 


   Chronicity: chronic   Qualified Code(s): M25.512 - Pain in left shoulder; 

G89.29 - Other chronic pain; G89.29 - Other chronic pain   


Is this a current diagnosis for this admission?: Yes   


Plan: 


Appears to be arthritic. Continue pain management.








(3) Tachycardia


Is this a current diagnosis for this admission?: Yes   


Plan: 


Resolved and due to septicemia.








(4) UTI (urinary tract infection)


Qualifiers: 


   Urinary tract infection type: acute cystitis   Hematuria presence: with 

hematuria   Qualified Code(s): N30.01 - Acute cystitis with hematuria   


Is this a current diagnosis for this admission?: Yes   


Plan: 


Preliminary urine culture shows gpc- likely due to Enterococcus. Continue 

vancomycin. 








(5) Pulmonary emboli


Qualifiers: 


   Pulmonary embolism type: other 


Is this a current diagnosis for this admission?: No   


Plan: 


Continue xarelto as outpatient








(6) NSTEMI (non-ST elevated myocardial infarction)


Is this a current diagnosis for this admission?: Yes   


Plan: 


Due to demand ischemia .








(7) Chronic pain


Is this a current diagnosis for this admission?: Yes   


Plan: 


Discontinue fentanyl patch due to hypotension and instead placed him on MS 

Contin.  Continue muscle relaxer and morphine IR.








(8) Weakness


Is this a current diagnosis for this admission?: Yes   


Plan: 


Continue PT. Patient had been bedridden for a long time and difficult to 

mobilize








(9) Septicemia


Is this a current diagnosis for this admission?: Yes   


Plan: 


Blood culture growing staph epidermidis. Resistant to multiple antibiotics.  

Blood cultures repeated and negative for the first 24 hours








(10) Hypotension


Is this a current diagnosis for this admission?: Yes   


Plan: 


We will gave him normal saline bolus and discontinue fentanyl patch.  Noted 

this issue since fentanyl was increased.








- Time


Time Spent with patient: 15-24 minutes


Medications reviewed and adjusted accordingly: Yes


Anticipated discharge: Hospice


Within: within 48 hours





- Inpatient Certification


Based on my medical assessment, after consideration of the patient's 

comorbidities, presenting symptoms, or acuity I expect that the services needed 

warrant INPATIENT care.: Yes


I certify that my determination is in accordance with my understanding of 

Medicare's requirements for reasonable and necessary INPATIENT services [42 CFR 

412.3e].: Yes


Medical Necessity: Need for Pain Control, Need for IV Antibiotics

## 2017-11-10 RX ADMIN — VANCOMYCIN HYDROCHLORIDE SCH MG: 1 INJECTION, POWDER, LYOPHILIZED, FOR SOLUTION INTRAVENOUS at 05:08

## 2017-11-10 RX ADMIN — TIZANIDINE SCH MG: 4 TABLET ORAL at 09:14

## 2017-11-10 RX ADMIN — RIVAROXABAN SCH MG: 10 TABLET, FILM COATED ORAL at 09:13

## 2017-11-10 RX ADMIN — GABAPENTIN SCH MG: 100 CAPSULE ORAL at 15:28

## 2017-11-10 RX ADMIN — MORPHINE SULFATE SCH MG: 30 TABLET, EXTENDED RELEASE ORAL at 21:34

## 2017-11-10 RX ADMIN — METOPROLOL SUCCINATE SCH MG: 50 TABLET, EXTENDED RELEASE ORAL at 18:16

## 2017-11-10 RX ADMIN — ESCITALOPRAM OXALATE SCH MG: 10 TABLET, FILM COATED ORAL at 21:34

## 2017-11-10 RX ADMIN — LOSARTAN POTASSIUM SCH MG: 50 TABLET, FILM COATED ORAL at 09:16

## 2017-11-10 RX ADMIN — TIZANIDINE SCH MG: 4 TABLET ORAL at 21:34

## 2017-11-10 RX ADMIN — ALPRAZOLAM SCH MG: 0.5 TABLET ORAL at 15:16

## 2017-11-10 RX ADMIN — TAMSULOSIN HYDROCHLORIDE SCH MG: 0.4 CAPSULE ORAL at 18:15

## 2017-11-10 RX ADMIN — MORPHINE SULFATE PRN MG: 15 TABLET ORAL at 01:01

## 2017-11-10 RX ADMIN — LANSOPRAZOLE SCH MG: 30 TABLET, ORALLY DISINTEGRATING, DELAYED RELEASE ORAL at 05:08

## 2017-11-10 RX ADMIN — ALPRAZOLAM SCH MG: 0.5 TABLET ORAL at 05:07

## 2017-11-10 RX ADMIN — GABAPENTIN SCH MG: 100 CAPSULE ORAL at 05:07

## 2017-11-10 RX ADMIN — LEVOTHYROXINE SODIUM SCH MG: 25 TABLET ORAL at 09:14

## 2017-11-10 RX ADMIN — GABAPENTIN SCH MG: 100 CAPSULE ORAL at 21:33

## 2017-11-10 RX ADMIN — MORPHINE SULFATE SCH MG: 30 TABLET, EXTENDED RELEASE ORAL at 09:14

## 2017-11-10 RX ADMIN — LINEZOLID SCH MG: 600 TABLET, FILM COATED ORAL at 18:15

## 2017-11-10 NOTE — PDOC PROGRESS REPORT
Subjective


Progress Note for:: 11/10/17


Subjective:: 





Patient reports the pain is better today. Nurse notified systolic blood 

pressure in the 90s





ROS


All organ systems evaluated and negative except for as discussed in subjective





All laboratories and significant laboratories reviewed





Physical Exam


Vital Signs: 


 











Temp Pulse Resp BP Pulse Ox


 


 98.8 F   92   16   146/78 H  97 


 


 11/10/17 00:00  11/10/17 02:00  11/10/17 00:00  11/10/17 00:00  11/10/17 00:00








 Intake & Output











 11/09/17 11/10/17 11/11/17





 06:59 06:59 06:59


 


Intake Total 1240 3438 


 


Balance 1240 3438 


 


Weight 69.7 kg 68.7 kg 











General appearance: PRESENT: no acute distress, cooperative, thin


Head exam: PRESENT: atraumatic, normocephalic


Eye exam: PRESENT: EOMI, PERRLA


Ear exam: PRESENT: normal external ear exam


Mouth exam: PRESENT: moist


Teeth exam: PRESENT: edentulous


Neck exam: PRESENT: tenderness.  ABSENT: JVD


Respiratory exam: PRESENT: clear to auscultation alexandra


Cardiovascular exam: PRESENT: RRR.  ABSENT: diastolic murmur, systolic murmur


Vascular exam: PRESENT: normal capillary refill


GI/Abdominal exam: PRESENT: normal bowel sounds, soft.  ABSENT: tenderness


Extremities exam: ABSENT: joint swelling, pedal edema


Musculoskeletal exam: PRESENT: full ROM


Neurological exam: PRESENT: alert, oriented to person


Psychiatric exam: PRESENT: appropriate affect, normal mood





Results


Laboratory Results: 


 





 11/06/17 07:09 





 11/09/17 05:54 





 











  11/09/17





  05:54


 


Creatinine  0.90


 


Est GFR ( Amer)  > 60


 


Est GFR (Non-Af Amer)  > 60











Impressions: 


 





Chest X-Ray  11/03/17 08:59


IMPRESSION:  Minimal bibasilar atelectasis.


 








Shoulder X-Ray  11/03/17 08:59


IMPRESSION:  Osteoporotic.  No acute fracture or malalignment.


 








Chest/Abdomen CTA  11/03/17 12:02


IMPRESSION:  Obstructive lung disease.


Bibasilar atelectasis


No CT angio evidence of acute pulmonary emboli.


 














Assessment & Plan





- Diagnosis


(1) Chest pain


Qualifiers: 


   Chest pain type: unspecified   Qualified Code(s): R07.9 - Chest pain, 

unspecified   


Is this a current diagnosis for this admission?: Yes   


Plan: 


Musculoskeletal in nature. Appears comfortable today with current regimen and 

to continue








(2) Left shoulder pain


Qualifiers: 


   Chronicity: chronic   Qualified Code(s): M25.512 - Pain in left shoulder; 

G89.29 - Other chronic pain; G89.29 - Other chronic pain   


Is this a current diagnosis for this admission?: Yes   


Plan: 


Appears to be arthritic. Continue pain management.








(3) Tachycardia


Is this a current diagnosis for this admission?: Yes   


Plan: 


Resolved and due to septicemia.








(4) UTI (urinary tract infection)


Qualifiers: 


   Urinary tract infection type: acute cystitis   Hematuria presence: with 

hematuria   Qualified Code(s): N30.01 - Acute cystitis with hematuria   


Is this a current diagnosis for this admission?: Yes   


Plan: 


Urine culture growing E raffinosus. To place on zyvox since will cover for 

Staph epidermidis








(5) Pulmonary emboli


Qualifiers: 


   Pulmonary embolism type: other 


Is this a current diagnosis for this admission?: No   


Plan: 


Continue xarelto as outpatient








(6) NSTEMI (non-ST elevated myocardial infarction)


Is this a current diagnosis for this admission?: Yes   


Plan: 


Due to demand ischemia (type 2)








(7) Chronic pain


Is this a current diagnosis for this admission?: Yes   


Plan: 


Continue MS Contin, morphine IR, Zanaflex.  Patient seen by palliative care








(8) Weakness


Is this a current diagnosis for this admission?: Yes   


Plan: 


Physical therapy has been consulted however patient not been amenable to 

participate in physical therapy








(9) Septicemia


Is this a current diagnosis for this admission?: Yes   


Plan: 


Blood culture positive for staph epidermidis 1 set.  Patient had been doing 

well and will discontinue vancomycin and placed on Zyvox.








(10) Hypotension


Is this a current diagnosis for this admission?: Yes   


Plan: 


Persisting will adjust antihypertensive regimen and will monitor closely








(11) Anemia


Qualifiers: 


   Iron deficiency anemia type: other iron deficiency 


Is this a current diagnosis for this admission?: Yes   


Plan: 


Add iron supplementation to regimen








- Time


Time Spent with patient: 15-24 minutes


Medications reviewed and adjusted accordingly: Yes


Anticipated discharge: SNF





- Inpatient Certification


Based on my medical assessment, after consideration of the patient's 

comorbidities, presenting symptoms, or acuity I expect that the services needed 

warrant INPATIENT care.: Yes


I certify that my determination is in accordance with my understanding of 

Medicare's requirements for reasonable and necessary INPATIENT services [42 CFR 

412.3e].: Yes


Medical Necessity: Need For Continuous Telemetry Monitoring, Need for Pain 

Control

## 2017-11-11 RX ADMIN — GABAPENTIN SCH MG: 100 CAPSULE ORAL at 14:15

## 2017-11-11 RX ADMIN — ALPRAZOLAM SCH MG: 0.5 TABLET ORAL at 10:15

## 2017-11-11 RX ADMIN — LEVOTHYROXINE SODIUM SCH MG: 25 TABLET ORAL at 10:04

## 2017-11-11 RX ADMIN — ESCITALOPRAM OXALATE SCH MG: 10 TABLET, FILM COATED ORAL at 21:54

## 2017-11-11 RX ADMIN — RIVAROXABAN SCH MG: 10 TABLET, FILM COATED ORAL at 10:04

## 2017-11-11 RX ADMIN — MORPHINE SULFATE SCH MG: 30 TABLET, EXTENDED RELEASE ORAL at 10:00

## 2017-11-11 RX ADMIN — GABAPENTIN SCH MG: 100 CAPSULE ORAL at 05:44

## 2017-11-11 RX ADMIN — GABAPENTIN SCH MG: 100 CAPSULE ORAL at 21:54

## 2017-11-11 RX ADMIN — ALPRAZOLAM SCH MG: 0.5 TABLET ORAL at 21:54

## 2017-11-11 RX ADMIN — LANSOPRAZOLE SCH MG: 30 TABLET, ORALLY DISINTEGRATING, DELAYED RELEASE ORAL at 05:44

## 2017-11-11 RX ADMIN — METOPROLOL SUCCINATE SCH MG: 50 TABLET, EXTENDED RELEASE ORAL at 17:24

## 2017-11-11 RX ADMIN — FLUDROCORTISONE ACETATE SCH MG: 0.1 TABLET ORAL at 10:04

## 2017-11-11 RX ADMIN — TIZANIDINE SCH MG: 4 TABLET ORAL at 21:54

## 2017-11-11 RX ADMIN — ALPRAZOLAM SCH MG: 0.5 TABLET ORAL at 04:33

## 2017-11-11 RX ADMIN — TAMSULOSIN HYDROCHLORIDE SCH MG: 0.4 CAPSULE ORAL at 17:25

## 2017-11-11 RX ADMIN — LINEZOLID SCH MG: 600 TABLET, FILM COATED ORAL at 17:24

## 2017-11-11 RX ADMIN — TIZANIDINE SCH MG: 4 TABLET ORAL at 10:04

## 2017-11-11 RX ADMIN — MORPHINE SULFATE SCH MG: 30 TABLET, EXTENDED RELEASE ORAL at 21:54

## 2017-11-11 RX ADMIN — LINEZOLID SCH MG: 600 TABLET, FILM COATED ORAL at 05:44

## 2017-11-11 RX ADMIN — ALPRAZOLAM SCH MG: 0.5 TABLET ORAL at 14:15

## 2017-11-11 RX ADMIN — LOSARTAN POTASSIUM SCH MG: 50 TABLET, FILM COATED ORAL at 10:05

## 2017-11-11 NOTE — PDOC PROGRESS REPORT
Subjective


Progress Note for:: 11/11/17


Subjective:: 


Her Abad is an 82-year-old male who presented to the emergency department 7 

times complaining of shoulder pain.  During this hospitalization he has grown 

staph epidermidis from 1 blood culture.  He has also had a repeat blood culture 

that is negative.  His urine culture has grown E. coli and Enterococcus 

faecalis followed by enterococcus raffinosis.  The patient has now been 

evaluated by hospice.  At this point in time he continues on Zyvox therapy.  

Pain control is his major issue.  He is now on MS Contin and morphine IR.  He 

does have low blood pressures likely from the narcotics.  We are trying to make 

arrangements to discharge to skilled nursing facility on hospice.








Physical Exam


Vital Signs: 


 











Temp Pulse Resp BP Pulse Ox


 


 98.4 F   82   15   78/46 L  99 


 


 11/11/17 07:17  11/11/17 07:17  11/11/17 07:17  11/11/17 07:17  11/11/17 07:17








 Intake & Output











 11/10/17 11/11/17 11/12/17





 06:59 06:59 06:59


 


Intake Total 3438 661 


 


Balance 3438 661 


 


Weight 68.7 kg 68.2 kg 











Additional comments: 


When I examined the patient today.  He was sitting in an upright position.  An 

aide was helping him eat his breakfast.  During our conversation he appeared to 

have some respiratory difficulty.  He made high-pitched upper airway sounds.  

After reassuring the patient that he was okay this subsided.  This appears to 

be associated with anxiety.  During this time his lungs remained clear 

bilaterally.  His cardiac exam was noted to be regular without murmurs, gallops 

or rubs.  The abdomen is minimally distended.  Bowel sounds were noted in the 

lower quadrants.  He does not have any guarding or rebound noted and there are 

no hernias or masses present.  The lower extremities were warm to touch.  No 

pitting edema was noted.








Results


Laboratory Results: 


 





 11/06/17 07:09 





 11/09/17 05:54 





 





11/07/17 17:36   Catheterized Urine   Urine Culture - Final


                            Enterococcus Raffinosus








Impressions: 


 





Chest X-Ray  11/03/17 08:59


IMPRESSION:  Minimal bibasilar atelectasis.


 








Shoulder X-Ray  11/03/17 08:59


IMPRESSION:  Osteoporotic.  No acute fracture or malalignment.


 








Chest/Abdomen CTA  11/03/17 12:02


IMPRESSION:  Obstructive lung disease.


Bibasilar atelectasis


No CT angio evidence of acute pulmonary emboli.


 














Assessment & Plan





- Diagnosis


(1) Anemia


Qualifiers: 


   Iron deficiency anemia type: other iron deficiency 


Is this a current diagnosis for this admission?: Yes   





(2) Chest pain


Qualifiers: 


   Chest pain type: unspecified   Qualified Code(s): R07.9 - Chest pain, 

unspecified   


Is this a current diagnosis for this admission?: Yes   





(3) Chronic pain


Is this a current diagnosis for this admission?: Yes   








(5) Hypotension


Is this a current diagnosis for this admission?: Yes   





(6) Left shoulder pain


Qualifiers: 


   Chronicity: chronic   Qualified Code(s): M25.512 - Pain in left shoulder; 

G89.29 - Other chronic pain; G89.29 - Other chronic pain   


Is this a current diagnosis for this admission?: Yes   





(7) NSTEMI (non-ST elevated myocardial infarction)


Is this a current diagnosis for this admission?: Yes   





(8) Septicemia


Is this a current diagnosis for this admission?: Yes   





(9) Tachycardia


Is this a current diagnosis for this admission?: Yes   





(10) UTI (urinary tract infection)


Qualifiers: 


   Urinary tract infection type: acute cystitis   Hematuria presence: with 

hematuria   Qualified Code(s): N30.01 - Acute cystitis with hematuria   


Is this a current diagnosis for this admission?: Yes   





(11) Weakness


Is this a current diagnosis for this admission?: Yes   





(12) Pulmonary emboli


Qualifiers: 


   Pulmonary embolism type: other 


Is this a current diagnosis for this admission?: No   








- Time


Time Spent with patient: 15-24 minutes





- Inpatient Certification


Medical Necessity: Need for Pain Control





- Plan Summary


Plan Summary: 


The patient's pain appears largely to be musculoskeletal.  His chest pain and 

left shoulder pain appear to be secondary to arthritis.  The patient had 

complications due to sepsis this hospitalization.  His septicemia is secondary 

to his urinary tract infection and likely his blood cultures which are positive 

for staph epidermidis.  He remains on Zyvox.  The patient had a type II non-ST 

elevation myocardial infarction during this hospitalization.  He remains on 

Xarelto for pulmonary emboli.  He does have significant weakness but declines 

physical therapy.  His hypotension at this point is likely related to narcotic 

use.  However, patient is very adamant about maintaining good pain control.  He 

is being transferred to hospice.  Therefore, at this point time our goals of 

care are to manage pain.  However, his antihypertensive have been discontinued.

  Iron has been added for iron deficiency anemia.

## 2017-11-12 RX ADMIN — TIZANIDINE SCH MG: 4 TABLET ORAL at 21:36

## 2017-11-12 RX ADMIN — GABAPENTIN SCH MG: 100 CAPSULE ORAL at 21:36

## 2017-11-12 RX ADMIN — FLUDROCORTISONE ACETATE SCH MG: 0.1 TABLET ORAL at 10:28

## 2017-11-12 RX ADMIN — METOPROLOL SUCCINATE SCH MG: 50 TABLET, EXTENDED RELEASE ORAL at 17:07

## 2017-11-12 RX ADMIN — GABAPENTIN SCH MG: 100 CAPSULE ORAL at 05:53

## 2017-11-12 RX ADMIN — RIVAROXABAN SCH MG: 10 TABLET, FILM COATED ORAL at 10:29

## 2017-11-12 RX ADMIN — TAMSULOSIN HYDROCHLORIDE SCH MG: 0.4 CAPSULE ORAL at 17:07

## 2017-11-12 RX ADMIN — MORPHINE SULFATE SCH MG: 30 TABLET, EXTENDED RELEASE ORAL at 21:34

## 2017-11-12 RX ADMIN — LEVOTHYROXINE SODIUM SCH MG: 25 TABLET ORAL at 10:28

## 2017-11-12 RX ADMIN — ALPRAZOLAM SCH MG: 0.5 TABLET ORAL at 21:35

## 2017-11-12 RX ADMIN — MORPHINE SULFATE SCH MG: 30 TABLET, EXTENDED RELEASE ORAL at 10:28

## 2017-11-12 RX ADMIN — ESCITALOPRAM OXALATE SCH MG: 10 TABLET, FILM COATED ORAL at 21:36

## 2017-11-12 RX ADMIN — ALPRAZOLAM SCH MG: 0.5 TABLET ORAL at 16:19

## 2017-11-12 RX ADMIN — LINEZOLID SCH MG: 600 TABLET, FILM COATED ORAL at 05:53

## 2017-11-12 RX ADMIN — LINEZOLID SCH MG: 600 TABLET, FILM COATED ORAL at 17:07

## 2017-11-12 RX ADMIN — TIZANIDINE SCH MG: 4 TABLET ORAL at 10:28

## 2017-11-12 RX ADMIN — GABAPENTIN SCH MG: 100 CAPSULE ORAL at 16:19

## 2017-11-12 RX ADMIN — ALPRAZOLAM SCH MG: 0.5 TABLET ORAL at 05:53

## 2017-11-12 RX ADMIN — LOSARTAN POTASSIUM SCH MG: 50 TABLET, FILM COATED ORAL at 10:31

## 2017-11-12 RX ADMIN — LANSOPRAZOLE SCH MG: 30 TABLET, ORALLY DISINTEGRATING, DELAYED RELEASE ORAL at 05:53

## 2017-11-12 NOTE — PDOC PROGRESS REPORT
Subjective


Progress Note for:: 11/12/17


Subjective:: 


Her Abad is an 82-year-old male who presented to the emergency department 7 

times complaining of shoulder pain.  During this hospitalization he has grown 

staph epidermidis from 1 blood culture.  He has also had a repeat blood culture 

that is negative.  His urine culture has grown E. coli and Enterococcus 

faecalis followed by enterococcus raffinosis.  The patient has now been 

evaluated by hospice.  At this point in time he continues on Zyvox therapy.  

Pain control is his major issue.  He is now on MS Contin and morphine IR.  He 

does have low blood pressures likely from the narcotics.  We are trying to make 

arrangements to discharge to skilled nursing facility on hospice.  11/11/2017 I 

did decrease the patient's total dose of morphine.  This has led to a more 

normal blood pressure and the patient still appears to have adequate pain 

control.








Physical Exam


Vital Signs: 


 











Temp Pulse Resp BP Pulse Ox


 


 97.9 F   71   15   92/48 L  97 


 


 11/12/17 07:43  11/12/17 07:43  11/12/17 07:43  11/12/17 07:43  11/12/17 07:43








 Intake & Output











 11/11/17 11/12/17 11/13/17





 06:59 06:59 06:59


 


Intake Total 661 565 


 


Balance 661 565 


 


Weight 68.2 kg 69.6 kg 











Additional comments: 


The patient was asleep when I went to examine him.  He appeared to be fairly 

comfortable and did not wake up during my exam.  His facial appearance is 

unremarkable.  His lungs are noted to be clear to auscultation anteriorly and 

in the mid axillary lines.  His cardiac exam is regular.  He does not have any 

murmurs, gallops or rubs.  The abdomen is soft and flat.  Bowel sounds are 

noted in the lower quadrants but are somewhat hypoactive today.  The patient 

does not have guarding, rebound, hernias or masses.  The lower extremities are 

warm to touch without any significant edema.  Skin is warm dry and intact 

without lesions or rashes.








Results


Laboratory Results: 


 





 11/06/17 07:09 





 11/09/17 05:54 








Impressions: 


 





Chest X-Ray  11/03/17 08:59


IMPRESSION:  Minimal bibasilar atelectasis.


 








Shoulder X-Ray  11/03/17 08:59


IMPRESSION:  Osteoporotic.  No acute fracture or malalignment.


 








Chest/Abdomen CTA  11/03/17 12:02


IMPRESSION:  Obstructive lung disease.


Bibasilar atelectasis


No CT angio evidence of acute pulmonary emboli.


 














Assessment & Plan





- Diagnosis


(1) Anemia


Qualifiers: 


   Iron deficiency anemia type: other iron deficiency 


Is this a current diagnosis for this admission?: Yes   





(2) Chest pain


Qualifiers: 


   Chest pain type: unspecified   Qualified Code(s): R07.9 - Chest pain, 

unspecified   


Is this a current diagnosis for this admission?: Yes   





(3) Chronic pain


Is this a current diagnosis for this admission?: Yes   








(5) Hypotension


Is this a current diagnosis for this admission?: Yes   





(6) Left shoulder pain


Qualifiers: 


   Chronicity: chronic   Qualified Code(s): M25.512 - Pain in left shoulder; 

G89.29 - Other chronic pain; G89.29 - Other chronic pain   


Is this a current diagnosis for this admission?: Yes   





(7) NSTEMI (non-ST elevated myocardial infarction)


Is this a current diagnosis for this admission?: Yes   





(8) Septicemia


Is this a current diagnosis for this admission?: Yes   





(9) Tachycardia


Is this a current diagnosis for this admission?: Yes   





(10) UTI (urinary tract infection)


Qualifiers: 


   Urinary tract infection type: acute cystitis   Hematuria presence: with 

hematuria   Qualified Code(s): N30.01 - Acute cystitis with hematuria   


Is this a current diagnosis for this admission?: Yes   





(11) Weakness


Is this a current diagnosis for this admission?: Yes   





(12) Pulmonary emboli


Qualifiers: 


   Pulmonary embolism type: other 


Is this a current diagnosis for this admission?: No   








- Plan Summary


Plan Summary: 


The patient's pain appears largely to be musculoskeletal.  His chest pain and 

left shoulder pain appear to be secondary to arthritis.  The patient had 

complications due to sepsis this hospitalization.  His septicemia is secondary 

to his urinary tract infection (bacteria outlined above) and likely his blood 

cultures which are positive for staph epidermidis.  He remains on Zyvox.  The 

patient had a type II non-ST elevation myocardial infarction during this 

hospitalization.  He remains on Xarelto for pulmonary emboli.  He does have 

significant weakness but declines physical therapy.  His hypotension at this 

point is likely related to narcotic use. Blood pressure medications were 

discontinued last week. Yesterday, I significantly decreased the patient's 

narcotics. This appears to have normalized the blood pressure. Also, his pain 

control appears to be adequate.  He is being transferred to hospice.   Iron has 

been added for iron deficiency anemia.

## 2017-11-13 RX ADMIN — LEVOTHYROXINE SODIUM SCH MG: 25 TABLET ORAL at 09:15

## 2017-11-13 RX ADMIN — GABAPENTIN SCH MG: 100 CAPSULE ORAL at 14:01

## 2017-11-13 RX ADMIN — ALPRAZOLAM SCH MG: 0.5 TABLET ORAL at 14:01

## 2017-11-13 RX ADMIN — FLUDROCORTISONE ACETATE SCH MG: 0.1 TABLET ORAL at 09:15

## 2017-11-13 RX ADMIN — ALPRAZOLAM SCH MG: 0.5 TABLET ORAL at 06:30

## 2017-11-13 RX ADMIN — GABAPENTIN SCH MG: 100 CAPSULE ORAL at 21:55

## 2017-11-13 RX ADMIN — LANSOPRAZOLE SCH MG: 30 TABLET, ORALLY DISINTEGRATING, DELAYED RELEASE ORAL at 06:30

## 2017-11-13 RX ADMIN — ALPRAZOLAM SCH MG: 0.5 TABLET ORAL at 21:54

## 2017-11-13 RX ADMIN — RIVAROXABAN SCH MG: 10 TABLET, FILM COATED ORAL at 09:15

## 2017-11-13 RX ADMIN — TIZANIDINE SCH MG: 4 TABLET ORAL at 09:15

## 2017-11-13 RX ADMIN — GABAPENTIN SCH MG: 100 CAPSULE ORAL at 06:29

## 2017-11-13 RX ADMIN — MORPHINE SULFATE SCH MG: 30 TABLET, EXTENDED RELEASE ORAL at 11:49

## 2017-11-13 RX ADMIN — METOPROLOL SUCCINATE SCH MG: 50 TABLET, EXTENDED RELEASE ORAL at 17:28

## 2017-11-13 RX ADMIN — TIZANIDINE SCH MG: 4 TABLET ORAL at 21:55

## 2017-11-13 RX ADMIN — ESCITALOPRAM OXALATE SCH MG: 10 TABLET, FILM COATED ORAL at 21:55

## 2017-11-13 RX ADMIN — LOSARTAN POTASSIUM SCH MG: 50 TABLET, FILM COATED ORAL at 09:15

## 2017-11-13 RX ADMIN — LINEZOLID SCH MG: 600 TABLET, FILM COATED ORAL at 17:28

## 2017-11-13 RX ADMIN — TAMSULOSIN HYDROCHLORIDE SCH MG: 0.4 CAPSULE ORAL at 17:28

## 2017-11-13 RX ADMIN — LINEZOLID SCH MG: 600 TABLET, FILM COATED ORAL at 06:29

## 2017-11-13 NOTE — PDOC PROGRESS REPORT
Subjective


Progress Note for:: 11/13/17


Subjective:: 


Her Abad is an 82-year-old male who presented to the emergency department 7 

times complaining of shoulder pain.  During this hospitalization he has grown 

staph epidermidis from 1 blood culture.  He has also had a repeat blood culture 

that is negative.  His urine culture has grown E. coli and Enterococcus 

faecalis followed by enterococcus raffinosis.  The patient has now been 

evaluated by hospice.  At this point in time he continues on Zyvox therapy.  

Pain control is his major issue.  Due to persistent issues with low blood 

pressure I have now stopped long-acting morphine.  The patient's pain now 

appears to be adequately controlled.  He does have good relief with the muscle 

relaxant (tizanadine).  The patient is being transitioned to hospice.  

Discharge planning is working on this.  Discharge planning has updated [facility] and we are waiting to hear back from [facility] regarding whether 

or not the patient has been accepted to hospice at [facility].








Physical Exam


Vital Signs: 


 











Temp Pulse Resp BP Pulse Ox


 


 98.3 F   71   16   101/56 L  91 L


 


 11/13/17 11:16  11/13/17 11:16  11/13/17 11:16  11/13/17 11:16  11/13/17 11:16








 Intake & Output











 11/12/17 11/13/17 11/14/17





 06:59 06:59 06:59


 


Intake Total 565 2821 287


 


Balance 565 2821 287


 


Weight 69.6 kg 71.8 kg 











Additional comments: 


Patient was sleeping this morning.  He appeared to be very comfortable.  He did 

not wake up when I examined him.  His facial appearance is unremarkable.  His 

lungs were noted to be clear to auscultation anteriorly and in the mid axillary 

lines.  His cardiac exam is regular.  I do not appreciate any murmurs, gallops 

or rubs.  The abdomen is noted to be soft and flat.  Bowel sounds are present.  

There is no guarding or rebound noted and there are no hernias or masses 

present.  The lower extremities are warm to touch without any significant edema.








Results


Laboratory Results: 


 





 11/06/17 07:09 





 11/09/17 05:54 





 





11/07/17 18:25   Blood   Blood Culture - Final


                            NO GROWTH IN 5 DAYS


11/07/17 17:36   Blood   Blood Culture - Final


                            NO GROWTH IN 5 DAYS








Impressions: 


 





Chest X-Ray  11/03/17 08:59


IMPRESSION:  Minimal bibasilar atelectasis.


 








Shoulder X-Ray  11/03/17 08:59


IMPRESSION:  Osteoporotic.  No acute fracture or malalignment.


 








Chest/Abdomen CTA  11/03/17 12:02


IMPRESSION:  Obstructive lung disease.


Bibasilar atelectasis


No CT angio evidence of acute pulmonary emboli.


 














Assessment & Plan





- Diagnosis


(1) Anemia


Qualifiers: 


   Iron deficiency anemia type: other iron deficiency 


Is this a current diagnosis for this admission?: Yes   





(2) Chest pain


Qualifiers: 


   Chest pain type: unspecified   Qualified Code(s): R07.9 - Chest pain, 

unspecified   


Is this a current diagnosis for this admission?: Yes   





(3) Chronic pain


Is this a current diagnosis for this admission?: Yes   








(5) Hypotension


Is this a current diagnosis for this admission?: Yes   





(6) Left shoulder pain


Qualifiers: 


   Chronicity: chronic   Qualified Code(s): M25.512 - Pain in left shoulder; 

G89.29 - Other chronic pain; G89.29 - Other chronic pain   


Is this a current diagnosis for this admission?: Yes   





(7) NSTEMI (non-ST elevated myocardial infarction)


Is this a current diagnosis for this admission?: Yes   





(8) Septicemia


Is this a current diagnosis for this admission?: Yes   





(9) Tachycardia


Is this a current diagnosis for this admission?: Yes   





(10) UTI (urinary tract infection)


Qualifiers: 


   Urinary tract infection type: acute cystitis   Hematuria presence: with 

hematuria   Qualified Code(s): N30.01 - Acute cystitis with hematuria   


Is this a current diagnosis for this admission?: Yes   





(11) Weakness


Is this a current diagnosis for this admission?: Yes   





(12) Pulmonary emboli


Qualifiers: 


   Pulmonary embolism type: other 


Is this a current diagnosis for this admission?: No   








- Time


Time Spent with patient: 15-24 minutes





- Inpatient Certification


Medical Necessity: Need for Pain Control





- Plan Summary


Plan Summary: 


The patient's pain appears largely to be musculoskeletal.  His chest pain and 

left shoulder pain appear to be secondary to arthritis.  The patient had 

complications due to sepsis this hospitalization.  His septicemia is secondary 

to his urinary tract infection (bacteria outlined above) and likely his blood 

cultures which are positive for staph epidermidis.  He remains on Zyvox.  The 

patient had a type II non-ST elevation myocardial infarction during this 

hospitalization.  He remains on Xarelto for pulmonary emboli.  He does have 

significant weakness but declines physical therapy.  His hypotension at this 

point is likely related to narcotic use. Blood pressure medications were 

discontinued last week.  Today, I completely discontinued long-acting morphine 

because his blood pressure is low and he does not appear to need this.  He has 

been persistently sleepy and his pain is under good control.  He continues to 

have morphine 5 mg as needed every 5 hours.   Iron has been added for iron 

deficiency anemia.  Charge planning is working on the patient's disposition.  

At this point time we are trying to transition the patient to hospice care at 

[facility].

## 2017-11-14 RX ADMIN — LANSOPRAZOLE SCH MG: 30 TABLET, ORALLY DISINTEGRATING, DELAYED RELEASE ORAL at 05:57

## 2017-11-14 RX ADMIN — ALPRAZOLAM SCH MG: 0.5 TABLET ORAL at 05:57

## 2017-11-14 RX ADMIN — METOPROLOL SUCCINATE SCH MG: 50 TABLET, EXTENDED RELEASE ORAL at 18:35

## 2017-11-14 RX ADMIN — GABAPENTIN SCH MG: 100 CAPSULE ORAL at 14:51

## 2017-11-14 RX ADMIN — LEVOTHYROXINE SODIUM SCH MG: 25 TABLET ORAL at 09:31

## 2017-11-14 RX ADMIN — LINEZOLID SCH MG: 600 TABLET, FILM COATED ORAL at 05:57

## 2017-11-14 RX ADMIN — LOSARTAN POTASSIUM SCH MG: 50 TABLET, FILM COATED ORAL at 09:30

## 2017-11-14 RX ADMIN — GABAPENTIN SCH MG: 100 CAPSULE ORAL at 22:05

## 2017-11-14 RX ADMIN — MORPHINE SULFATE PRN MG: 10 SOLUTION ORAL at 22:06

## 2017-11-14 RX ADMIN — MORPHINE SULFATE PRN MG: 10 SOLUTION ORAL at 09:57

## 2017-11-14 RX ADMIN — TIZANIDINE SCH MG: 4 TABLET ORAL at 22:04

## 2017-11-14 RX ADMIN — TIZANIDINE SCH MG: 4 TABLET ORAL at 09:30

## 2017-11-14 RX ADMIN — FLUDROCORTISONE ACETATE SCH MG: 0.1 TABLET ORAL at 09:30

## 2017-11-14 RX ADMIN — ALPRAZOLAM SCH MG: 0.5 TABLET ORAL at 14:08

## 2017-11-14 RX ADMIN — LINEZOLID SCH MG: 600 TABLET, FILM COATED ORAL at 18:36

## 2017-11-14 RX ADMIN — GABAPENTIN SCH MG: 100 CAPSULE ORAL at 05:57

## 2017-11-14 RX ADMIN — TAMSULOSIN HYDROCHLORIDE SCH MG: 0.4 CAPSULE ORAL at 18:36

## 2017-11-14 RX ADMIN — ALPRAZOLAM SCH MG: 0.5 TABLET ORAL at 22:04

## 2017-11-14 RX ADMIN — ESCITALOPRAM OXALATE SCH MG: 10 TABLET, FILM COATED ORAL at 22:05

## 2017-11-14 RX ADMIN — RIVAROXABAN SCH MG: 10 TABLET, FILM COATED ORAL at 09:30

## 2017-11-14 NOTE — PDOC PROGRESS REPORT
Subjective


Progress Note for:: 11/14/17


Subjective:: 


Mr Abad is an 82-year-old male who presented to the ED with CC of shoulder 

pain.  During hospitalization had 1/2 blood cultures positive for grown staph 

epidermidis, with repeat cx negative. Urine culture + for E. coli and 

Enterococcus faecalis followed by enterococcus raffinosis.  He was evaluated by 

hospice who felt that hospice care was appopriate. 





No overnight events. Was planned to go to hospice however patient's family (

Cookie Patino) told discharge planning that she did not want patient to return to 

[facility], and would prefer that patient go to [facility]. Additionally 

Patient would not be able to go to hospice facility without code status being 

"Comfort Measures Only".





Patient seen and examined at bedside. States that he is doing "OK". Major 

complaint is pain in his shoulders. Also feels cold in the tips of his fingers. 

Denies fevers, chills, CP, SOB, abdominal pain, NV. Patient's brother at 

bedside.  





Physical Exam


Vital Signs: 


 











Temp Pulse Resp BP Pulse Ox


 


 97.5 F   69   16   84/51 L  96 


 


 11/14/17 11:49  11/14/17 14:04  11/14/17 11:49  11/14/17 14:04  11/14/17 14:04








 Intake & Output











 11/13/17 11/14/17 11/15/17





 06:59 06:59 06:59


 


Intake Total 2821 595 1013


 


Balance 2821 595 1013


 


Weight 71.8 kg 71.2 kg 











General appearance: PRESENT: no acute distress, other - Resting comfortably in 

bed


Head exam: PRESENT: atraumatic, normocephalic


Eye exam: PRESENT: conjunctiva pink, EOMI, PERRLA.  ABSENT: scleral icterus


Mouth exam: PRESENT: moist


Neck exam: ABSENT: carotid bruit, JVD, lymphadenopathy, thyromegaly


Respiratory exam: PRESENT: clear to auscultation alexandra - CTAB anteriorly.  ABSENT

: rales, rhonchi, wheezes


Cardiovascular exam: PRESENT: RRR.  ABSENT: diastolic murmur, rubs, systolic 

murmur


Vascular exam: PRESENT: normal capillary refill


GI/Abdominal exam: PRESENT: normal bowel sounds, soft.  ABSENT: distended, 

guarding, mass, organolmegaly, rebound, tenderness


Rectal exam: PRESENT: deferred


Extremities exam: ABSENT: calf tenderness, clubbing, pedal edema


Neurological exam: PRESENT: alert, awake, other - Detailed neuro exam not 

performed.  ABSENT: motor sensory deficit


Psychiatric exam: PRESENT: appropriate affect, normal mood.  ABSENT: homicidal 

ideation, suicidal ideation


Skin exam: PRESENT: dry, intact, warm.  ABSENT: cyanosis, rash





Results


Laboratory Results: 


 





 11/06/17 07:09 





 11/09/17 05:54 








Impressions: 


 





Chest X-Ray  11/03/17 08:59


IMPRESSION:  Minimal bibasilar atelectasis.


 








Shoulder X-Ray  11/03/17 08:59


IMPRESSION:  Osteoporotic.  No acute fracture or malalignment.


 








Chest/Abdomen CTA  11/03/17 12:02


IMPRESSION:  Obstructive lung disease.


Bibasilar atelectasis


No CT angio evidence of acute pulmonary emboli.


 














Assessment & Plan





- Diagnosis


(1) DNR (do not resuscitate)


Plan: 


Patient is currently DNI/DNR code status. It does not appear he will qualify 

for hopsice without a change in code status to comfort measures only. Upon 

chart review, does not appear that there as been further discussion regarding de

-escalation of care to CMO. I did attempt to call patient's daughter (Cookie Patino, [phone]) to discuss code status and goals of care. She did not pick 

up and voicemail was left. Cookie has also indicated that she prefer patient to 

go to [facility]. Discharge planning inquiring about bed status.





Plan


- Discuss goals of care and code status with POA


- Follow up regarding discharge planning status 








(2) Hypotension


Is this a current diagnosis for this admission?: Yes   


Plan: 


On 11/14, noted to have SBP in mid 80s. Patient is asymptomatic. Discussed with 

RN, will give 500CC bolus and will re-evaluated. CTM. 








(3) Left shoulder pain


Qualifiers: 


   Chronicity: chronic   Qualified Code(s): M25.512 - Pain in left shoulder; 

G89.29 - Other chronic pain; G89.29 - Other chronic pain   


Is this a current diagnosis for this admission?: Yes   


Plan: 


Unchanged, continues to be an issue. Has Morphine IV ordered PRN. 








(4) Pulmonary emboli


Qualifiers: 


   Pulmonary embolism type: other 


Is this a current diagnosis for this admission?: No   


Plan: 


Continue home eliquis








- Time


Time Spent with patient: 15-24 minutes


Critical Time spent with patient: 15-24 minutes


Anticipated discharge: SNF, Hospice


Within: within 48 hours

## 2017-11-15 RX ADMIN — LINEZOLID SCH MG: 600 TABLET, FILM COATED ORAL at 05:29

## 2017-11-15 RX ADMIN — METOPROLOL SUCCINATE SCH MG: 50 TABLET, EXTENDED RELEASE ORAL at 18:03

## 2017-11-15 RX ADMIN — ALPRAZOLAM SCH MG: 0.5 TABLET ORAL at 21:29

## 2017-11-15 RX ADMIN — MORPHINE SULFATE PRN MG: 10 SOLUTION ORAL at 02:13

## 2017-11-15 RX ADMIN — ESCITALOPRAM OXALATE SCH MG: 10 TABLET, FILM COATED ORAL at 21:29

## 2017-11-15 RX ADMIN — TAMSULOSIN HYDROCHLORIDE SCH MG: 0.4 CAPSULE ORAL at 18:03

## 2017-11-15 RX ADMIN — GABAPENTIN SCH MG: 100 CAPSULE ORAL at 05:30

## 2017-11-15 RX ADMIN — ALPRAZOLAM SCH MG: 0.5 TABLET ORAL at 13:11

## 2017-11-15 RX ADMIN — TIZANIDINE SCH MG: 4 TABLET ORAL at 21:29

## 2017-11-15 RX ADMIN — RIVAROXABAN SCH MG: 10 TABLET, FILM COATED ORAL at 09:22

## 2017-11-15 RX ADMIN — GABAPENTIN SCH MG: 100 CAPSULE ORAL at 13:11

## 2017-11-15 RX ADMIN — FLUDROCORTISONE ACETATE SCH MG: 0.1 TABLET ORAL at 09:23

## 2017-11-15 RX ADMIN — LANSOPRAZOLE SCH MG: 30 TABLET, ORALLY DISINTEGRATING, DELAYED RELEASE ORAL at 05:29

## 2017-11-15 RX ADMIN — LINEZOLID SCH MG: 600 TABLET, FILM COATED ORAL at 18:03

## 2017-11-15 RX ADMIN — TIZANIDINE SCH MG: 4 TABLET ORAL at 09:23

## 2017-11-15 RX ADMIN — GABAPENTIN SCH MG: 100 CAPSULE ORAL at 21:29

## 2017-11-15 RX ADMIN — LOSARTAN POTASSIUM SCH MG: 50 TABLET, FILM COATED ORAL at 09:22

## 2017-11-15 RX ADMIN — ALPRAZOLAM SCH MG: 0.5 TABLET ORAL at 05:29

## 2017-11-15 RX ADMIN — LEVOTHYROXINE SODIUM SCH MG: 25 TABLET ORAL at 09:22

## 2017-11-15 RX ADMIN — TETRAHYDROZOLINE HCL 0.05% PRN DROP: 0.05 SOLUTION/ DROPS INTRAOCULAR at 09:23

## 2017-11-15 NOTE — PDOC PROGRESS REPORT
Subjective


Progress Note for:: 11/15/17


Subjective:: 





No new issues this visit. 





Physical Exam


Vital Signs: 


 











Temp Pulse Resp BP Pulse Ox


 


 98.1 F   85   16   139/73 H  99 


 


 11/15/17 07:37  11/15/17 09:22  11/15/17 07:37  11/15/17 09:22  11/15/17 09:22








 Intake & Output











 11/14/17 11/15/17 11/16/17





 06:59 06:59 06:59


 


Intake Total 595 1748 


 


Balance 595 1748 


 


Weight 71.2 kg 67.7 kg 











General appearance: PRESENT: no acute distress, thin


Head exam: PRESENT: atraumatic, normocephalic


Eye exam: PRESENT: conjunctiva pink, EOMI, PERRLA.  ABSENT: scleral icterus


Ear exam: PRESENT: normal external ear exam


Mouth exam: PRESENT: moist, tongue midline


Neck exam: ABSENT: carotid bruit, JVD, lymphadenopathy, thyromegaly


Respiratory exam: PRESENT: clear to auscultation alexandra.  ABSENT: rales, rhonchi, 

wheezes


Cardiovascular exam: PRESENT: RRR.  ABSENT: diastolic murmur, rubs, systolic 

murmur


Pulses: PRESENT: normal carotid pulses


Vascular exam: PRESENT: normal capillary refill


GI/Abdominal exam: PRESENT: normal bowel sounds, soft.  ABSENT: distended, 

guarding, mass, organolmegaly, rebound, tenderness


Rectal exam: PRESENT: deferred


Extremities exam: PRESENT: full ROM.  ABSENT: calf tenderness, clubbing, pedal 

edema


Neurological exam: PRESENT: alert, awake, oriented to person


Psychiatric exam: PRESENT: agitated, other - Oriented to self only.


Skin exam: PRESENT: dry, intact, warm.  ABSENT: cyanosis, rash





Results


Laboratory Results: 


 





 11/06/17 07:09 





 11/09/17 05:54 








Impressions: 


 





Chest X-Ray  11/03/17 08:59


IMPRESSION:  Minimal bibasilar atelectasis.


 








Shoulder X-Ray  11/03/17 08:59


IMPRESSION:  Osteoporotic.  No acute fracture or malalignment.


 








Chest/Abdomen CTA  11/03/17 12:02


IMPRESSION:  Obstructive lung disease.


Bibasilar atelectasis


No CT angio evidence of acute pulmonary emboli.


 














Assessment & Plan





- Diagnosis


(1) Sepsis


Is this a current diagnosis for this admission?: Yes   


Plan: 


Secondary to Acute Cystitis due to E. Coli and Enterococcus:  Will continue 

Zyvox for 7 days.  Stop date for Zyvox is 11/07/2017.








(2) Moderate protein-calorie malnutrition


Is this a current diagnosis for this admission?: Yes   


Plan: 


Encourage good PO intake.  








(3) UTI (urinary tract infection)


Qualifiers: 


   Urinary tract infection type: acute cystitis   Hematuria presence: with 

hematuria   Qualified Code(s): N30.01 - Acute cystitis with hematuria   


Is this a current diagnosis for this admission?: Yes   


Plan: 


Secondary to E.coli and Enterococcus:  continue Zyvox for total of 7 days. 








(4) Hypotension


Is this a current diagnosis for this admission?: Yes   


Plan: 


Resolved.  Will continue to monitor. 








(5) Left shoulder pain


Qualifiers: 


   Chronicity: chronic   Qualified Code(s): M25.512 - Pain in left shoulder; 

G89.29 - Other chronic pain; G89.29 - Other chronic pain   


Is this a current diagnosis for this admission?: Yes   


Plan: 


Supportive care. 








(6) NSTEMI (non-ST elevated myocardial infarction)


Is this a current diagnosis for this admission?: Yes   


Plan: 


Medical management. 








(7) Pulmonary emboli


Qualifiers: 


   Pulmonary embolism type: other 


Is this a current diagnosis for this admission?: No   


Plan: 


Will continue Xarelto.








(8) DNR (do not resuscitate)


Is this a current diagnosis for this admission?: Yes   


Plan: 


Pt currently DNR.  Will discuss with family regarding comfort care measures. 








(9) DVT prophylaxis


Is this a current diagnosis for this admission?: Yes   


Plan: 


xarelto








- Time


Time Spent with patient: 15-24 minutes

## 2017-11-16 RX ADMIN — TIZANIDINE SCH MG: 4 TABLET ORAL at 10:41

## 2017-11-16 RX ADMIN — TAMSULOSIN HYDROCHLORIDE SCH MG: 0.4 CAPSULE ORAL at 17:40

## 2017-11-16 RX ADMIN — FLUDROCORTISONE ACETATE SCH MG: 0.1 TABLET ORAL at 10:40

## 2017-11-16 RX ADMIN — GABAPENTIN SCH MG: 100 CAPSULE ORAL at 05:49

## 2017-11-16 RX ADMIN — LANSOPRAZOLE SCH MG: 30 TABLET, ORALLY DISINTEGRATING, DELAYED RELEASE ORAL at 05:49

## 2017-11-16 RX ADMIN — LOSARTAN POTASSIUM SCH MG: 50 TABLET, FILM COATED ORAL at 10:41

## 2017-11-16 RX ADMIN — ALPRAZOLAM SCH MG: 0.5 TABLET ORAL at 13:41

## 2017-11-16 RX ADMIN — LINEZOLID SCH MG: 600 TABLET, FILM COATED ORAL at 05:49

## 2017-11-16 RX ADMIN — RIVAROXABAN SCH MG: 10 TABLET, FILM COATED ORAL at 10:40

## 2017-11-16 RX ADMIN — TIZANIDINE SCH MG: 4 TABLET ORAL at 23:19

## 2017-11-16 RX ADMIN — LEVOTHYROXINE SODIUM SCH MG: 25 TABLET ORAL at 10:40

## 2017-11-16 RX ADMIN — LINEZOLID SCH MG: 600 TABLET, FILM COATED ORAL at 18:37

## 2017-11-16 RX ADMIN — ALPRAZOLAM SCH MG: 0.5 TABLET ORAL at 23:18

## 2017-11-16 RX ADMIN — METOPROLOL SUCCINATE SCH MG: 50 TABLET, EXTENDED RELEASE ORAL at 17:41

## 2017-11-16 RX ADMIN — GABAPENTIN SCH MG: 100 CAPSULE ORAL at 13:41

## 2017-11-16 RX ADMIN — ALPRAZOLAM SCH MG: 0.5 TABLET ORAL at 05:49

## 2017-11-16 RX ADMIN — GABAPENTIN SCH MG: 100 CAPSULE ORAL at 23:18

## 2017-11-16 RX ADMIN — ESCITALOPRAM OXALATE SCH MG: 10 TABLET, FILM COATED ORAL at 23:16

## 2017-11-16 RX ADMIN — MAGNESIUM SULFATE IN DEXTROSE SCH ML: 10 INJECTION, SOLUTION INTRAVENOUS at 10:41

## 2017-11-16 RX ADMIN — MAGNESIUM SULFATE IN DEXTROSE SCH ML: 10 INJECTION, SOLUTION INTRAVENOUS at 12:03

## 2017-11-17 LAB
FERRITIN SERPL-MCNC: 222 NG/ML (ref 17.9–464)
FOLATE SERPL-MCNC: 7.52 NG/ML (ref 2.76–?)
VIT B12 SERPL-MCNC: > 1000 PG/ML (ref 239–931)

## 2017-11-17 RX ADMIN — RIVAROXABAN SCH MG: 10 TABLET, FILM COATED ORAL at 11:21

## 2017-11-17 RX ADMIN — ALPRAZOLAM SCH MG: 0.5 TABLET ORAL at 05:39

## 2017-11-17 RX ADMIN — GABAPENTIN SCH MG: 100 CAPSULE ORAL at 22:58

## 2017-11-17 RX ADMIN — FLUDROCORTISONE ACETATE SCH MG: 0.1 TABLET ORAL at 11:23

## 2017-11-17 RX ADMIN — GABAPENTIN SCH MG: 100 CAPSULE ORAL at 15:22

## 2017-11-17 RX ADMIN — TIZANIDINE SCH MG: 4 TABLET ORAL at 11:21

## 2017-11-17 RX ADMIN — MAGNESIUM SULFATE IN DEXTROSE SCH ML: 10 INJECTION, SOLUTION INTRAVENOUS at 13:10

## 2017-11-17 RX ADMIN — LEVOTHYROXINE SODIUM SCH MG: 25 TABLET ORAL at 11:21

## 2017-11-17 RX ADMIN — METOPROLOL SUCCINATE SCH MG: 50 TABLET, EXTENDED RELEASE ORAL at 17:00

## 2017-11-17 RX ADMIN — LANSOPRAZOLE SCH MG: 30 TABLET, ORALLY DISINTEGRATING, DELAYED RELEASE ORAL at 05:39

## 2017-11-17 RX ADMIN — LOSARTAN POTASSIUM SCH MG: 50 TABLET, FILM COATED ORAL at 11:21

## 2017-11-17 RX ADMIN — GABAPENTIN SCH MG: 100 CAPSULE ORAL at 05:39

## 2017-11-17 RX ADMIN — ALPRAZOLAM SCH MG: 0.5 TABLET ORAL at 15:22

## 2017-11-17 RX ADMIN — ESCITALOPRAM OXALATE SCH MG: 10 TABLET, FILM COATED ORAL at 22:58

## 2017-11-17 RX ADMIN — TIZANIDINE SCH MG: 4 TABLET ORAL at 22:58

## 2017-11-17 RX ADMIN — LINEZOLID SCH MG: 600 TABLET, FILM COATED ORAL at 05:39

## 2017-11-17 RX ADMIN — TAMSULOSIN HYDROCHLORIDE SCH MG: 0.4 CAPSULE ORAL at 18:27

## 2017-11-17 RX ADMIN — MAGNESIUM SULFATE IN DEXTROSE SCH ML: 10 INJECTION, SOLUTION INTRAVENOUS at 11:32

## 2017-11-17 NOTE — PDOC PROGRESS REPORT
Subjective


Progress Note for:: 11/17/17


Subjective:: 


No new issues.








Physical Exam


Vital Signs: 


 











Temp Pulse Resp BP Pulse Ox


 


 98.6 F   97   20   116/61   97 


 


 11/17/17 12:00  11/17/17 12:00  11/17/17 12:00  11/17/17 12:00  11/17/17 12:00








 Intake & Output











 11/16/17 11/17/17 11/18/17





 06:59 06:59 06:59


 


Intake Total 242 522 


 


Balance 242 522 


 


Weight 69.3 kg 69.1 kg 











General appearance: PRESENT: no acute distress, well-developed, well-nourished


Head exam: PRESENT: atraumatic, normocephalic


Eye exam: PRESENT: conjunctiva pink, EOMI, PERRLA.  ABSENT: scleral icterus


Ear exam: PRESENT: normal external ear exam


Mouth exam: PRESENT: moist, tongue midline


Neck exam: ABSENT: carotid bruit, JVD, lymphadenopathy, thyromegaly


Respiratory exam: PRESENT: clear to auscultation alexandra.  ABSENT: rales, rhonchi, 

wheezes


Cardiovascular exam: PRESENT: RRR.  ABSENT: diastolic murmur, rubs, systolic 

murmur


Pulses: PRESENT: normal dorsalis pedis pul


Vascular exam: PRESENT: normal capillary refill


GI/Abdominal exam: PRESENT: normal bowel sounds, soft.  ABSENT: distended, 

guarding, mass, organolmegaly, rebound, tenderness


Rectal exam: PRESENT: deferred


Musculoskeletal exam: PRESENT: full ROM


Neurological exam: PRESENT: other - sleeping but would open eyes.





Results


Laboratory Results: 


 





 11/06/17 07:09 





 11/09/17 05:54 





 











  11/17/17 11/17/17





  04:45 04:45


 


Retic Count (auto)  0.94 


 


Absolute Retic  0.032 


 


Iron   63.7


 


TIBC   194 L


 


% Saturation   33


 


Ferritin   222.00


 


Vitamin B12   > 1000.0 H


 


Folate   7.52











Impressions: 


 





Chest X-Ray  11/03/17 08:59


IMPRESSION:  Minimal bibasilar atelectasis.


 








Shoulder X-Ray  11/03/17 08:59


IMPRESSION:  Osteoporotic.  No acute fracture or malalignment.


 








Chest/Abdomen CTA  11/03/17 12:02


IMPRESSION:  Obstructive lung disease.


Bibasilar atelectasis


No CT angio evidence of acute pulmonary emboli.


 














Assessment & Plan





- Diagnosis


(1) Sepsis


Is this a current diagnosis for this admission?: Yes   


Plan: 


Secondary to Acute Cystitis due to E. Coli and Enterococcus:  Completed 

treatment. 








(2) Moderate protein-calorie malnutrition


Is this a current diagnosis for this admission?: Yes   


Plan: 


Encourage good PO intake.  








(3) UTI (urinary tract infection)


Qualifiers: 


   Urinary tract infection type: acute cystitis   Hematuria presence: with 

hematuria   Qualified Code(s): N30.01 - Acute cystitis with hematuria   


Is this a current diagnosis for this admission?: Yes   


Plan: 


Secondary to E.coli and Enterococcus:  Completed treatment.  








(4) Hypotension


Is this a current diagnosis for this admission?: Yes   


Plan: 


Resolved.  Will continue to monitor. 








(5) Left shoulder pain


Qualifiers: 


   Chronicity: chronic   Qualified Code(s): M25.512 - Pain in left shoulder; 

G89.29 - Other chronic pain; G89.29 - Other chronic pain   


Is this a current diagnosis for this admission?: Yes   


Plan: 


Supportive care. 








(6) NSTEMI (non-ST elevated myocardial infarction)


Is this a current diagnosis for this admission?: Yes   


Plan: 


Medical management. 








(7) Pulmonary emboli


Qualifiers: 


   Pulmonary embolism type: other 


Is this a current diagnosis for this admission?: No   


Plan: 


Will continue Xarelto.








(8) DNR (do not resuscitate)


Is this a current diagnosis for this admission?: Yes   


Plan: 


Pt currently DNR.  Will discuss with family regarding comfort care measures. 








(9) Anemia


Qualifiers: 


   Iron deficiency anemia type: other iron deficiency 


Is this a current diagnosis for this admission?: Yes   


Plan: 


of Chronic Disease:Supportive care.   








(10) Hypomagnesemia


Is this a current diagnosis for this admission?: Yes   


Plan: 


Will give additional Magnesium. 








(11) DVT prophylaxis


Is this a current diagnosis for this admission?: Yes   


Plan: 


xarelto








- Time


Time Spent with patient: Less than 15 minutes

## 2017-11-18 LAB
ANION GAP SERPL CALC-SCNC: 11 MMOL/L (ref 5–19)
BUN SERPL-MCNC: 26 MG/DL (ref 7–20)
CALCIUM: 8.9 MG/DL (ref 8.4–10.2)
CHLORIDE SERPL-SCNC: 101 MMOL/L (ref 98–107)
CO2 SERPL-SCNC: 25 MMOL/L (ref 22–30)
CREAT SERPL-MCNC: 2.14 MG/DL (ref 0.52–1.25)
GLUCOSE SERPL-MCNC: 98 MG/DL (ref 75–110)
MAGNESIUM SERPL-MCNC: 2.2 MG/DL (ref 1.6–2.3)
POTASSIUM SERPL-SCNC: 3.8 MMOL/L (ref 3.6–5)
SODIUM SERPL-SCNC: 136.5 MMOL/L (ref 137–145)

## 2017-11-18 RX ADMIN — MORPHINE SULFATE PRN MG: 10 SOLUTION ORAL at 07:07

## 2017-11-18 RX ADMIN — TIZANIDINE SCH MG: 4 TABLET ORAL at 09:57

## 2017-11-18 RX ADMIN — FLUDROCORTISONE ACETATE SCH MG: 0.1 TABLET ORAL at 09:57

## 2017-11-18 RX ADMIN — GABAPENTIN SCH MG: 100 CAPSULE ORAL at 05:19

## 2017-11-18 RX ADMIN — GABAPENTIN SCH MG: 100 CAPSULE ORAL at 21:20

## 2017-11-18 RX ADMIN — LANSOPRAZOLE SCH MG: 30 TABLET, ORALLY DISINTEGRATING, DELAYED RELEASE ORAL at 05:19

## 2017-11-18 RX ADMIN — MORPHINE SULFATE PRN MG: 10 SOLUTION ORAL at 21:20

## 2017-11-18 RX ADMIN — MORPHINE SULFATE PRN MG: 10 SOLUTION ORAL at 15:09

## 2017-11-18 RX ADMIN — TAMSULOSIN HYDROCHLORIDE SCH MG: 0.4 CAPSULE ORAL at 17:27

## 2017-11-18 RX ADMIN — LEVOTHYROXINE SODIUM SCH MG: 25 TABLET ORAL at 09:57

## 2017-11-18 RX ADMIN — RIVAROXABAN SCH MG: 10 TABLET, FILM COATED ORAL at 09:56

## 2017-11-18 RX ADMIN — LOSARTAN POTASSIUM SCH MG: 50 TABLET, FILM COATED ORAL at 09:55

## 2017-11-18 RX ADMIN — TIZANIDINE SCH MG: 4 TABLET ORAL at 21:20

## 2017-11-18 RX ADMIN — METOPROLOL SUCCINATE SCH MG: 50 TABLET, EXTENDED RELEASE ORAL at 17:28

## 2017-11-18 RX ADMIN — ESCITALOPRAM OXALATE SCH MG: 10 TABLET, FILM COATED ORAL at 21:20

## 2017-11-18 RX ADMIN — SODIUM CHLORIDE PRN ML: 9 INJECTION, SOLUTION INTRAVENOUS at 18:23

## 2017-11-18 RX ADMIN — GABAPENTIN SCH MG: 100 CAPSULE ORAL at 14:37

## 2017-11-18 RX ADMIN — MORPHINE SULFATE PRN MG: 10 SOLUTION ORAL at 02:31

## 2017-11-18 NOTE — PDOC PROGRESS REPORT
Subjective


Progress Note for:: 11/18/17


Subjective:: 


Nursing states that pt is no eating or drinking much.  Pt states that he is not 

very hungry.  Nursing states that pt reported that he is thirsty. 











Physical Exam


Vital Signs: 


 











Temp Pulse Resp BP Pulse Ox


 


 98.8 F   89   16   88/44 L  100 


 


 11/18/17 12:03  11/18/17 12:03  11/18/17 12:03  11/18/17 12:03  11/18/17 12:03








 Intake & Output











 11/17/17 11/18/17 11/19/17





 06:59 06:59 06:59


 


Intake Total 522 1595 


 


Balance 522 1595 


 


Weight 69.1 kg 71.9 kg 











General appearance: PRESENT: no acute distress, well-developed, well-nourished


Head exam: PRESENT: atraumatic, normocephalic


Eye exam: PRESENT: conjunctiva pink, EOMI.  ABSENT: scleral icterus


Ear exam: PRESENT: normal external ear exam


Mouth exam: PRESENT: dry mucosa, tongue midline


Neck exam: ABSENT: carotid bruit, JVD, lymphadenopathy, thyromegaly


Respiratory exam: PRESENT: clear to auscultation alexandra.  ABSENT: rales, rhonchi, 

wheezes


Cardiovascular exam: PRESENT: RRR.  ABSENT: diastolic murmur, rubs, systolic 

murmur


Pulses: PRESENT: normal dorsalis pedis pul


Vascular exam: PRESENT: normal capillary refill


GI/Abdominal exam: PRESENT: normal bowel sounds, soft.  ABSENT: distended, 

guarding, mass, organolmegaly, rebound, tenderness


Rectal exam: PRESENT: deferred


Extremities exam: PRESENT: full ROM, other - + muscle wasting bilateal lower 

ext..  ABSENT: calf tenderness, clubbing, pedal edema


Musculoskeletal exam: PRESENT: full ROM


Neurological exam: PRESENT: alert, altered, awake, oriented to person


Psychiatric exam: PRESENT: appropriate affect, normal mood.  ABSENT: homicidal 

ideation, suicidal ideation


Skin exam: PRESENT: dry, intact, warm.  ABSENT: cyanosis, rash





Results


Laboratory Results: 


 





 11/06/17 07:09 





 11/18/17 04:35 





 











  11/17/17 11/18/17





  04:45 04:35


 


Sodium   136.5 L


 


Potassium   3.8


 


Chloride   101


 


Carbon Dioxide   25


 


Anion Gap   11


 


BUN   26 H


 


Creatinine   2.14 H


 


Est GFR ( Amer)   36 L


 


Est GFR (Non-Af Amer)   30 L


 


Glucose   98


 


Calcium   8.9


 


Magnesium   2.2


 


Transferrin  139 L 











Impressions: 


 





Chest X-Ray  11/03/17 08:59


IMPRESSION:  Minimal bibasilar atelectasis.


 








Shoulder X-Ray  11/03/17 08:59


IMPRESSION:  Osteoporotic.  No acute fracture or malalignment.


 








Chest/Abdomen CTA  11/03/17 12:02


IMPRESSION:  Obstructive lung disease.


Bibasilar atelectasis


No CT angio evidence of acute pulmonary emboli.


 














Assessment & Plan





- Diagnosis


(1) Acute renal injury due to hypovolemia


Is this a current diagnosis for this admission?: Yes   


Plan: 


Will place on IVFs. Will stop Losartan.  








(2) Dehydration


Is this a current diagnosis for this admission?: Yes   


Plan: 


Will place on IVFs. 








(3) Dehydration with hyponatremia


Is this a current diagnosis for this admission?: Yes   


Plan: 


Will place on IVF. 








(4) Sepsis


Is this a current diagnosis for this admission?: Yes   


Plan: 


Secondary to Acute Cystitis due to E. Coli and Enterococcus:  Completed 

treatment. 








(5) Moderate protein-calorie malnutrition


Is this a current diagnosis for this admission?: Yes   


Plan: 


Encourage good PO intake.  Will add megace to see if this will improve 

appetite.  








(6) UTI (urinary tract infection)


Qualifiers: 


   Urinary tract infection type: acute cystitis   Hematuria presence: with 

hematuria   Qualified Code(s): N30.01 - Acute cystitis with hematuria   


Is this a current diagnosis for this admission?: Yes   


Plan: 


Secondary to E.coli and Enterococcus:  Completed treatment.  








(7) Hypotension


Is this a current diagnosis for this admission?: Yes   


Plan: 


Will hold Losartan.  Will place on IVFs. 








(8) Left shoulder pain


Qualifiers: 


   Chronicity: chronic   Qualified Code(s): M25.512 - Pain in left shoulder; 

G89.29 - Other chronic pain; G89.29 - Other chronic pain   


Is this a current diagnosis for this admission?: Yes   


Plan: 


Supportive care. 








(9) NSTEMI (non-ST elevated myocardial infarction)


Is this a current diagnosis for this admission?: Yes   


Plan: 


Medical management. 








(10) Pulmonary emboli


Qualifiers: 


   Pulmonary embolism type: other 


Is this a current diagnosis for this admission?: No   


Plan: 


Will continue Xarelto.








(11) DNR (do not resuscitate)


Is this a current diagnosis for this admission?: Yes   


Plan: 


Pt currently DNR.  Will discuss with family regarding comfort care measures. 








(12) Anemia


Qualifiers: 


   Iron deficiency anemia type: other iron deficiency 


Is this a current diagnosis for this admission?: Yes   


Plan: 


of Chronic Disease:Supportive care.   








(13) Hypomagnesemia


Is this a current diagnosis for this admission?: Yes   


Plan: 


Resolved.  








(14) DVT prophylaxis


Is this a current diagnosis for this admission?: Yes   


Plan: 


xarelto








- Time


Time Spent with patient: 15-24 minutes

## 2017-11-19 LAB
ANION GAP SERPL CALC-SCNC: 12 MMOL/L (ref 5–19)
BASOPHILS # BLD AUTO: 0 10^3/UL (ref 0–0.2)
BASOPHILS NFR BLD AUTO: 0.7 % (ref 0–2)
BUN SERPL-MCNC: 28 MG/DL (ref 7–20)
CALCIUM: 8.2 MG/DL (ref 8.4–10.2)
CHLORIDE SERPL-SCNC: 102 MMOL/L (ref 98–107)
CO2 SERPL-SCNC: 23 MMOL/L (ref 22–30)
CREAT SERPL-MCNC: 1.89 MG/DL (ref 0.52–1.25)
EOSINOPHIL # BLD AUTO: 0 10^3/UL (ref 0–0.6)
EOSINOPHIL NFR BLD AUTO: 0.5 % (ref 0–6)
ERYTHROCYTE [DISTWIDTH] IN BLOOD BY AUTOMATED COUNT: 17.2 % (ref 11.5–14)
GLUCOSE SERPL-MCNC: 124 MG/DL (ref 75–110)
HCT VFR BLD CALC: 23.9 % (ref 37.9–51)
HGB BLD-MCNC: 8.3 G/DL (ref 13.5–17)
HGB HCT DIFFERENCE: 1
LYMPHOCYTES # BLD AUTO: 0.9 10^3/UL (ref 0.5–4.7)
LYMPHOCYTES NFR BLD AUTO: 13.9 % (ref 13–45)
MCH RBC QN AUTO: 28.9 PG (ref 27–33.4)
MCHC RBC AUTO-ENTMCNC: 34.9 G/DL (ref 32–36)
MCV RBC AUTO: 83 FL (ref 80–97)
MONOCYTES # BLD AUTO: 0.5 10^3/UL (ref 0.1–1.4)
MONOCYTES NFR BLD AUTO: 7.1 % (ref 3–13)
NEUTROPHILS # BLD AUTO: 5.2 10^3/UL (ref 1.7–8.2)
NEUTS SEG NFR BLD AUTO: 77.8 % (ref 42–78)
POTASSIUM SERPL-SCNC: 3.9 MMOL/L (ref 3.6–5)
RBC # BLD AUTO: 2.88 10^6/UL (ref 4.35–5.55)
SODIUM SERPL-SCNC: 137.1 MMOL/L (ref 137–145)
WBC # BLD AUTO: 6.7 10^3/UL (ref 4–10.5)

## 2017-11-19 RX ADMIN — LANSOPRAZOLE SCH MG: 30 TABLET, ORALLY DISINTEGRATING, DELAYED RELEASE ORAL at 06:11

## 2017-11-19 RX ADMIN — GABAPENTIN SCH MG: 100 CAPSULE ORAL at 06:11

## 2017-11-19 RX ADMIN — METOPROLOL SUCCINATE SCH MG: 50 TABLET, EXTENDED RELEASE ORAL at 17:48

## 2017-11-19 RX ADMIN — RIVAROXABAN SCH MG: 10 TABLET, FILM COATED ORAL at 11:36

## 2017-11-19 RX ADMIN — TIZANIDINE SCH MG: 4 TABLET ORAL at 11:36

## 2017-11-19 RX ADMIN — MEGESTROL ACETATE SCH MG: 40 SUSPENSION ORAL at 11:34

## 2017-11-19 RX ADMIN — GABAPENTIN SCH MG: 100 CAPSULE ORAL at 16:09

## 2017-11-19 RX ADMIN — TAMSULOSIN HYDROCHLORIDE SCH MG: 0.4 CAPSULE ORAL at 17:48

## 2017-11-19 RX ADMIN — FLUDROCORTISONE ACETATE SCH MG: 0.1 TABLET ORAL at 11:35

## 2017-11-19 RX ADMIN — LEVOTHYROXINE SODIUM SCH MG: 25 TABLET ORAL at 11:35

## 2017-11-19 RX ADMIN — ESCITALOPRAM OXALATE SCH MG: 10 TABLET, FILM COATED ORAL at 21:26

## 2017-11-19 RX ADMIN — MORPHINE SULFATE PRN MG: 10 SOLUTION ORAL at 21:25

## 2017-11-19 RX ADMIN — GABAPENTIN SCH MG: 100 CAPSULE ORAL at 21:26

## 2017-11-19 RX ADMIN — TIZANIDINE SCH MG: 4 TABLET ORAL at 21:26

## 2017-11-19 RX ADMIN — MORPHINE SULFATE PRN MG: 10 SOLUTION ORAL at 11:35

## 2017-11-19 NOTE — PDOC PROGRESS REPORT
Subjective


Progress Note for:: 11/19/17


Subjective:: 





Pt sleeping. Nursing states that pt is doing well. Nursing states that when pt 

wakes up he is yelling for staff to come.  





Physical Exam


Vital Signs: 


 











Temp Pulse Resp BP Pulse Ox


 


 98.2 F   99   16   151/80 H  99 


 


 11/19/17 08:05  11/19/17 08:05  11/19/17 08:05  11/19/17 08:05  11/19/17 08:05








 Intake & Output











 11/18/17 11/19/17 11/20/17





 06:59 06:59 06:59


 


Intake Total 1595 1202 


 


Balance 1595 1202 


 


Weight 71.9 kg 68.5 kg 











General appearance: PRESENT: no acute distress, thin, other - sleeping.


Head exam: PRESENT: atraumatic, normocephalic


Eye exam: PRESENT: other - sleeping.


Ear exam: PRESENT: normal external ear exam


Mouth exam: PRESENT: moist, tongue midline


Neck exam: ABSENT: carotid bruit, JVD, lymphadenopathy, thyromegaly


Respiratory exam: PRESENT: clear to auscultation alexandra.  ABSENT: rales, rhonchi, 

wheezes


Cardiovascular exam: PRESENT: RRR.  ABSENT: diastolic murmur, rubs, systolic 

murmur


Pulses: PRESENT: normal dorsalis pedis pul


Vascular exam: PRESENT: normal capillary refill


GI/Abdominal exam: PRESENT: normal bowel sounds, soft.  ABSENT: distended, 

guarding, mass, organolmegaly, rebound, tenderness


Rectal exam: PRESENT: deferred


Extremities exam: PRESENT: full ROM.  ABSENT: calf tenderness, clubbing, pedal 

edema


Neurological exam: PRESENT: other - sleeping.


Psychiatric exam: PRESENT: other - sleeping


Skin exam: PRESENT: dry, intact, warm.  ABSENT: cyanosis, rash





Results


Laboratory Results: 


 





 11/19/17 04:39 





 11/19/17 04:39 





 











  11/17/17 11/19/17 11/19/17





  04:45 04:39 04:39


 


WBC   6.7 


 


RBC   2.88 L 


 


Hgb   8.3 L 


 


Hct   23.9 L 


 


MCV   83 


 


MCH   28.9 


 


MCHC   34.9 


 


RDW   17.2 H 


 


Plt Count   221 


 


Seg Neutrophils %   77.8 


 


Lymphocytes %   13.9 


 


Monocytes %   7.1 


 


Eosinophils %   0.5 


 


Basophils %   0.7 


 


Absolute Neutrophils   5.2 


 


Absolute Lymphocytes   0.9 


 


Absolute Monocytes   0.5 


 


Absolute Eosinophils   0.0 


 


Absolute Basophils   0.0 


 


Sodium    137.1


 


Potassium    3.9


 


Chloride    102


 


Carbon Dioxide    23


 


Anion Gap    12


 


BUN    28 H


 


Creatinine    1.89 H


 


Est GFR ( Amer)    42 L


 


Est GFR (Non-Af Amer)    34 L


 


Glucose    124 H


 


Calcium    8.2 L


 


Transferrin  139 L  











Impressions: 


 





Chest X-Ray  11/03/17 08:59


IMPRESSION:  Minimal bibasilar atelectasis.


 








Shoulder X-Ray  11/03/17 08:59


IMPRESSION:  Osteoporotic.  No acute fracture or malalignment.


 








Chest/Abdomen CTA  11/03/17 12:02


IMPRESSION:  Obstructive lung disease.


Bibasilar atelectasis


No CT angio evidence of acute pulmonary emboli.


 














Assessment & Plan





- Diagnosis


(1) Acute renal injury due to hypovolemia


Is this a current diagnosis for this admission?: Yes   


Plan: 


Will continue IVFs. Pt's renal function improving. Will stop Losartan.  








(2) Dehydration


Is this a current diagnosis for this admission?: Yes   


Plan: 


Will continue IVFs. 








(3) Dehydration with hyponatremia


Is this a current diagnosis for this admission?: Yes   


Plan: 


Resolved 








(4) Sepsis


Is this a current diagnosis for this admission?: Yes   


Plan: 


Secondary to Acute Cystitis due to E. Coli and Enterococcus:  Completed 

treatment. 








(5) Moderate protein-calorie malnutrition


Is this a current diagnosis for this admission?: Yes   


Plan: 


Encourage good PO intake.  Will continue megace to see if this will improve 

appetite.  








(6) UTI (urinary tract infection)


Qualifiers: 


   Urinary tract infection type: acute cystitis   Hematuria presence: with 

hematuria   Qualified Code(s): N30.01 - Acute cystitis with hematuria   


Is this a current diagnosis for this admission?: Yes   


Plan: 


Secondary to E.coli and Enterococcus:  Completed treatment.  








(7) Hypotension


Is this a current diagnosis for this admission?: Yes   


Plan: 


Will continue to hold Losartan.  Will continue IVFs. 








(8) Left shoulder pain


Qualifiers: 


   Chronicity: chronic   Qualified Code(s): M25.512 - Pain in left shoulder; 

G89.29 - Other chronic pain; G89.29 - Other chronic pain   


Is this a current diagnosis for this admission?: Yes   


Plan: 


Supportive care. 








(9) NSTEMI (non-ST elevated myocardial infarction)


Is this a current diagnosis for this admission?: Yes   


Plan: 


Medical management. 








(10) Pulmonary emboli


Qualifiers: 


   Pulmonary embolism type: other 


Is this a current diagnosis for this admission?: No   


Plan: 


Will continue Xarelto.








(11) DNR (do not resuscitate)


Is this a current diagnosis for this admission?: Yes   


Plan: 


Pt currently DNR.  Will discuss with family regarding comfort care measures. 








(12) Anemia


Qualifiers: 


   Iron deficiency anemia type: other iron deficiency 


Is this a current diagnosis for this admission?: Yes   


Plan: 


of Chronic Disease:Supportive care.   Will occult stool. 








(13) Hypomagnesemia


Is this a current diagnosis for this admission?: Yes   


Plan: 


Resolved.  








(14) DVT prophylaxis


Is this a current diagnosis for this admission?: Yes   


Plan: 


xarelto

## 2017-11-20 LAB
ANION GAP SERPL CALC-SCNC: 8 MMOL/L (ref 5–19)
BASOPHILS # BLD AUTO: 0 10^3/UL (ref 0–0.2)
BASOPHILS NFR BLD AUTO: 0.7 % (ref 0–2)
BUN SERPL-MCNC: 28 MG/DL (ref 7–20)
CALCIUM: 8.5 MG/DL (ref 8.4–10.2)
CHLORIDE SERPL-SCNC: 105 MMOL/L (ref 98–107)
CO2 SERPL-SCNC: 25 MMOL/L (ref 22–30)
CREAT SERPL-MCNC: 1.71 MG/DL (ref 0.52–1.25)
EOSINOPHIL # BLD AUTO: 0.1 10^3/UL (ref 0–0.6)
EOSINOPHIL NFR BLD AUTO: 1 % (ref 0–6)
ERYTHROCYTE [DISTWIDTH] IN BLOOD BY AUTOMATED COUNT: 17.5 % (ref 11.5–14)
GLUCOSE SERPL-MCNC: 88 MG/DL (ref 75–110)
HCT VFR BLD CALC: 26.3 % (ref 37.9–51)
HGB BLD-MCNC: 8.8 G/DL (ref 13.5–17)
HGB HCT DIFFERENCE: 0.1
LYMPHOCYTES # BLD AUTO: 1.3 10^3/UL (ref 0.5–4.7)
LYMPHOCYTES NFR BLD AUTO: 19.3 % (ref 13–45)
MCH RBC QN AUTO: 28.1 PG (ref 27–33.4)
MCHC RBC AUTO-ENTMCNC: 33.6 G/DL (ref 32–36)
MCV RBC AUTO: 84 FL (ref 80–97)
MONOCYTES # BLD AUTO: 0.7 10^3/UL (ref 0.1–1.4)
MONOCYTES NFR BLD AUTO: 11.1 % (ref 3–13)
NEUTROPHILS # BLD AUTO: 4.5 10^3/UL (ref 1.7–8.2)
NEUTS SEG NFR BLD AUTO: 67.9 % (ref 42–78)
POTASSIUM SERPL-SCNC: 3.8 MMOL/L (ref 3.6–5)
RBC # BLD AUTO: 3.15 10^6/UL (ref 4.35–5.55)
SODIUM SERPL-SCNC: 138 MMOL/L (ref 137–145)
WBC # BLD AUTO: 6.6 10^3/UL (ref 4–10.5)

## 2017-11-20 RX ADMIN — GABAPENTIN SCH MG: 100 CAPSULE ORAL at 06:25

## 2017-11-20 RX ADMIN — TIZANIDINE SCH MG: 4 TABLET ORAL at 09:19

## 2017-11-20 RX ADMIN — MEGESTROL ACETATE SCH MG: 40 SUSPENSION ORAL at 09:19

## 2017-11-20 RX ADMIN — LEVOTHYROXINE SODIUM SCH MG: 25 TABLET ORAL at 09:20

## 2017-11-20 RX ADMIN — ESCITALOPRAM OXALATE SCH MG: 10 TABLET, FILM COATED ORAL at 21:32

## 2017-11-20 RX ADMIN — LANSOPRAZOLE SCH MG: 30 TABLET, ORALLY DISINTEGRATING, DELAYED RELEASE ORAL at 06:25

## 2017-11-20 RX ADMIN — GABAPENTIN SCH MG: 100 CAPSULE ORAL at 21:32

## 2017-11-20 RX ADMIN — RIVAROXABAN SCH MG: 10 TABLET, FILM COATED ORAL at 09:20

## 2017-11-20 RX ADMIN — GABAPENTIN SCH MG: 100 CAPSULE ORAL at 15:20

## 2017-11-20 RX ADMIN — SODIUM CHLORIDE PRN ML: 9 INJECTION, SOLUTION INTRAVENOUS at 15:21

## 2017-11-20 RX ADMIN — TIZANIDINE SCH MG: 4 TABLET ORAL at 21:32

## 2017-11-20 RX ADMIN — FLUDROCORTISONE ACETATE SCH MG: 0.1 TABLET ORAL at 09:20

## 2017-11-20 RX ADMIN — MORPHINE SULFATE PRN MG: 10 SOLUTION ORAL at 06:25

## 2017-11-20 RX ADMIN — TAMSULOSIN HYDROCHLORIDE SCH MG: 0.4 CAPSULE ORAL at 17:23

## 2017-11-20 RX ADMIN — SODIUM CHLORIDE PRN ML: 9 INJECTION, SOLUTION INTRAVENOUS at 02:44

## 2017-11-20 RX ADMIN — METOPROLOL SUCCINATE SCH MG: 50 TABLET, EXTENDED RELEASE ORAL at 17:22

## 2017-11-20 NOTE — PDOC PROGRESS REPORT
Subjective


Progress Note for:: 11/20/17


Subjective:: 


Pt states that he is having pain.  Pt states that his appetite is better. 

Nursing reported no new issues. 








Physical Exam


Vital Signs: 


 











Temp Pulse Resp BP Pulse Ox


 


 98.1 F   97   16   141/75 H  97 


 


 11/20/17 15:43  11/20/17 15:43  11/20/17 15:43  11/20/17 15:43  11/20/17 15:43








 Intake & Output











 11/19/17 11/20/17 11/21/17





 06:59 06:59 06:59


 


Intake Total 1202 1805 


 


Balance 1202 1805 


 


Weight 68.5 kg 70.7 kg 











General appearance: PRESENT: no acute distress, thin


Head exam: PRESENT: atraumatic, normocephalic


Eye exam: PRESENT: conjunctiva pink, EOMI.  ABSENT: scleral icterus


Ear exam: PRESENT: normal external ear exam


Mouth exam: PRESENT: moist, tongue midline


Neck exam: ABSENT: carotid bruit, JVD, lymphadenopathy, thyromegaly


Respiratory exam: PRESENT: clear to auscultation alexandra.  ABSENT: rales, rhonchi, 

wheezes


Cardiovascular exam: PRESENT: RRR.  ABSENT: diastolic murmur, rubs, systolic 

murmur


Pulses: PRESENT: normal dorsalis pedis pul


Vascular exam: PRESENT: normal capillary refill


GI/Abdominal exam: PRESENT: normal bowel sounds, soft.  ABSENT: distended, 

guarding, mass, organolmegaly, rebound, tenderness


Rectal exam: PRESENT: deferred


Extremities exam: PRESENT: full ROM.  ABSENT: calf tenderness, clubbing, pedal 

edema


Neurological exam: PRESENT: alert, altered, awake, oriented to person


Psychiatric exam: PRESENT: appropriate affect, normal mood.  ABSENT: homicidal 

ideation, suicidal ideation


Skin exam: PRESENT: dry, intact, warm.  ABSENT: cyanosis, rash





Results


Laboratory Results: 


 





 11/20/17 07:56 





 11/20/17 07:56 





 











  11/20/17 11/20/17





  07:56 07:56


 


WBC  6.6 


 


RBC  3.15 L 


 


Hgb  8.8 L 


 


Hct  26.3 L 


 


MCV  84 


 


MCH  28.1 


 


MCHC  33.6 


 


RDW  17.5 H 


 


Plt Count  173 


 


Seg Neutrophils %  67.9 


 


Lymphocytes %  19.3 


 


Monocytes %  11.1 


 


Eosinophils %  1.0 


 


Basophils %  0.7 


 


Absolute Neutrophils  4.5 


 


Absolute Lymphocytes  1.3 


 


Absolute Monocytes  0.7 


 


Absolute Eosinophils  0.1 


 


Absolute Basophils  0.0 


 


Sodium   138.0


 


Potassium   3.8


 


Chloride   105


 


Carbon Dioxide   25


 


Anion Gap   8


 


BUN   28 H


 


Creatinine   1.71 H


 


Est GFR ( Amer)   47 L


 


Est GFR (Non-Af Amer)   39 L


 


Glucose   88


 


Calcium   8.5











Impressions: 


 





Chest X-Ray  11/03/17 08:59


IMPRESSION:  Minimal bibasilar atelectasis.


 








Shoulder X-Ray  11/03/17 08:59


IMPRESSION:  Osteoporotic.  No acute fracture or malalignment.


 








Chest/Abdomen CTA  11/03/17 12:02


IMPRESSION:  Obstructive lung disease.


Bibasilar atelectasis


No CT angio evidence of acute pulmonary emboli.


 














Assessment & Plan





- Diagnosis


(1) Acute renal injury due to hypovolemia


Is this a current diagnosis for this admission?: Yes   


Plan: 


Will continue IVFs. Pt's renal function improving. Losartan stopped.   








(2) Dehydration


Is this a current diagnosis for this admission?: Yes   


Plan: 


Will continue IVFs. 








(3) Dehydration with hyponatremia


Is this a current diagnosis for this admission?: Yes   


Plan: 


Resolved 








(4) Sepsis


Is this a current diagnosis for this admission?: Yes   


Plan: 


Secondary to Acute Cystitis due to E. Coli and Enterococcus:  Completed 

treatment. 








(5) Moderate protein-calorie malnutrition


Is this a current diagnosis for this admission?: Yes   


Plan: 


Encourage good PO intake.  Will continue megace to see if this will improve 

appetite.  








(6) UTI (urinary tract infection)


Qualifiers: 


   Urinary tract infection type: acute cystitis   Hematuria presence: with 

hematuria   Qualified Code(s): N30.01 - Acute cystitis with hematuria   


Is this a current diagnosis for this admission?: Yes   


Plan: 


Secondary to E.coli and Enterococcus:  Completed treatment.  








(7) Hypotension


Is this a current diagnosis for this admission?: Yes   


Plan: 


Will continue to hold Losartan.  Will continue IVFs. 








(8) Left shoulder pain


Qualifiers: 


   Chronicity: chronic   Qualified Code(s): M25.512 - Pain in left shoulder; 

G89.29 - Other chronic pain; G89.29 - Other chronic pain   


Is this a current diagnosis for this admission?: Yes   


Plan: 


Supportive care. 








(9) NSTEMI (non-ST elevated myocardial infarction)


Is this a current diagnosis for this admission?: Yes   


Plan: 


Medical management. 








(10) Pulmonary emboli


Qualifiers: 


   Pulmonary embolism type: other 


Is this a current diagnosis for this admission?: No   


Plan: 


Will continue Xarelto.








(11) DNR (do not resuscitate)


Is this a current diagnosis for this admission?: Yes   


Plan: 


Pt currently DNR.  Will discuss with family regarding comfort care measures. 








(12) Anemia


Qualifiers: 


   Iron deficiency anemia type: other iron deficiency 


Is this a current diagnosis for this admission?: Yes   


Plan: 


of Chronic Disease:Supportive care.   Occult stool Pending. 








(13) Hypomagnesemia


Is this a current diagnosis for this admission?: Yes   


Plan: 


Resolved.  








(14) DVT prophylaxis


Is this a current diagnosis for this admission?: Yes   


Plan: 


xarelto








- Time


Time Spent with patient: Less than 15 minutes

## 2017-11-21 RX ADMIN — LANSOPRAZOLE SCH MG: 30 TABLET, ORALLY DISINTEGRATING, DELAYED RELEASE ORAL at 06:41

## 2017-11-21 RX ADMIN — TIZANIDINE SCH MG: 4 TABLET ORAL at 23:07

## 2017-11-21 RX ADMIN — METOPROLOL SUCCINATE SCH MG: 50 TABLET, EXTENDED RELEASE ORAL at 11:01

## 2017-11-21 RX ADMIN — LEVOTHYROXINE SODIUM SCH MG: 25 TABLET ORAL at 11:02

## 2017-11-21 RX ADMIN — GABAPENTIN SCH MG: 100 CAPSULE ORAL at 15:24

## 2017-11-21 RX ADMIN — MEGESTROL ACETATE SCH MG: 40 SUSPENSION ORAL at 11:02

## 2017-11-21 RX ADMIN — MORPHINE SULFATE PRN MG: 10 SOLUTION ORAL at 03:16

## 2017-11-21 RX ADMIN — ESCITALOPRAM OXALATE SCH MG: 10 TABLET, FILM COATED ORAL at 23:07

## 2017-11-21 RX ADMIN — TIZANIDINE SCH MG: 4 TABLET ORAL at 11:03

## 2017-11-21 RX ADMIN — RIVAROXABAN SCH MG: 10 TABLET, FILM COATED ORAL at 11:00

## 2017-11-21 RX ADMIN — TAMSULOSIN HYDROCHLORIDE SCH MG: 0.4 CAPSULE ORAL at 18:00

## 2017-11-21 RX ADMIN — GABAPENTIN SCH MG: 100 CAPSULE ORAL at 06:41

## 2017-11-21 RX ADMIN — GABAPENTIN SCH MG: 100 CAPSULE ORAL at 23:06

## 2017-11-21 RX ADMIN — SODIUM CHLORIDE PRN ML: 9 INJECTION, SOLUTION INTRAVENOUS at 07:21

## 2017-11-21 RX ADMIN — FLUDROCORTISONE ACETATE SCH MG: 0.1 TABLET ORAL at 11:03

## 2017-11-21 RX ADMIN — MORPHINE SULFATE PRN MG: 10 SOLUTION ORAL at 15:34

## 2017-11-21 RX ADMIN — METOPROLOL SUCCINATE SCH MG: 50 TABLET, EXTENDED RELEASE ORAL at 23:06

## 2017-11-22 LAB
ANION GAP SERPL CALC-SCNC: 13 MMOL/L (ref 5–19)
BASOPHILS # BLD AUTO: 0 10^3/UL (ref 0–0.2)
BASOPHILS NFR BLD AUTO: 0.4 % (ref 0–2)
BUN SERPL-MCNC: 24 MG/DL (ref 7–20)
CALCIUM: 8.8 MG/DL (ref 8.4–10.2)
CHLORIDE SERPL-SCNC: 101 MMOL/L (ref 98–107)
CO2 SERPL-SCNC: 25 MMOL/L (ref 22–30)
CREAT SERPL-MCNC: 1.64 MG/DL (ref 0.52–1.25)
EOSINOPHIL # BLD AUTO: 0 10^3/UL (ref 0–0.6)
EOSINOPHIL NFR BLD AUTO: 0.4 % (ref 0–6)
ERYTHROCYTE [DISTWIDTH] IN BLOOD BY AUTOMATED COUNT: 17.2 % (ref 11.5–14)
GLUCOSE SERPL-MCNC: 80 MG/DL (ref 75–110)
HCT VFR BLD CALC: 27.2 % (ref 37.9–51)
HGB BLD-MCNC: 9.3 G/DL (ref 13.5–17)
HGB HCT DIFFERENCE: 0.7
LYMPHOCYTES # BLD AUTO: 1.1 10^3/UL (ref 0.5–4.7)
LYMPHOCYTES NFR BLD AUTO: 11.6 % (ref 13–45)
MAGNESIUM SERPL-MCNC: 1.6 MG/DL (ref 1.6–2.3)
MCH RBC QN AUTO: 28.5 PG (ref 27–33.4)
MCHC RBC AUTO-ENTMCNC: 34.3 G/DL (ref 32–36)
MCV RBC AUTO: 83 FL (ref 80–97)
MONOCYTES # BLD AUTO: 1.3 10^3/UL (ref 0.1–1.4)
MONOCYTES NFR BLD AUTO: 13.5 % (ref 3–13)
NEUTROPHILS # BLD AUTO: 7.3 10^3/UL (ref 1.7–8.2)
NEUTS SEG NFR BLD AUTO: 74.1 % (ref 42–78)
POTASSIUM SERPL-SCNC: 3.8 MMOL/L (ref 3.6–5)
RBC # BLD AUTO: 3.27 10^6/UL (ref 4.35–5.55)
SODIUM SERPL-SCNC: 138.8 MMOL/L (ref 137–145)
WBC # BLD AUTO: 9.8 10^3/UL (ref 4–10.5)

## 2017-11-22 RX ADMIN — RIVAROXABAN SCH MG: 10 TABLET, FILM COATED ORAL at 09:42

## 2017-11-22 RX ADMIN — ESCITALOPRAM OXALATE SCH MG: 10 TABLET, FILM COATED ORAL at 23:22

## 2017-11-22 RX ADMIN — FLUDROCORTISONE ACETATE SCH MG: 0.1 TABLET ORAL at 09:46

## 2017-11-22 RX ADMIN — TIZANIDINE SCH MG: 4 TABLET ORAL at 09:40

## 2017-11-22 RX ADMIN — GABAPENTIN SCH MG: 100 CAPSULE ORAL at 23:23

## 2017-11-22 RX ADMIN — LANSOPRAZOLE SCH MG: 30 TABLET, ORALLY DISINTEGRATING, DELAYED RELEASE ORAL at 05:36

## 2017-11-22 RX ADMIN — LEVOTHYROXINE SODIUM SCH MG: 25 TABLET ORAL at 05:36

## 2017-11-22 RX ADMIN — TIZANIDINE SCH MG: 4 TABLET ORAL at 23:22

## 2017-11-22 RX ADMIN — GABAPENTIN SCH MG: 100 CAPSULE ORAL at 05:35

## 2017-11-22 RX ADMIN — GABAPENTIN SCH MG: 100 CAPSULE ORAL at 15:41

## 2017-11-22 RX ADMIN — METOPROLOL SUCCINATE SCH MG: 50 TABLET, EXTENDED RELEASE ORAL at 09:41

## 2017-11-22 RX ADMIN — METOPROLOL SUCCINATE SCH MG: 50 TABLET, EXTENDED RELEASE ORAL at 23:22

## 2017-11-22 RX ADMIN — MEGESTROL ACETATE SCH MG: 40 SUSPENSION ORAL at 09:46

## 2017-11-22 RX ADMIN — TAMSULOSIN HYDROCHLORIDE SCH MG: 0.4 CAPSULE ORAL at 17:28

## 2017-11-22 NOTE — PDOC PROGRESS REPORT
Subjective


Progress Note for:: 11/21/17


Subjective:: 





Patient is a 82 year old male who presented multiple time to the ED fo pain.  

Patient has had polymicrobial UTI and was treated with zyvox.  Patient is 

awaiting placement in a nursing home with hospice.  Patient states he is doing 

okay.  He denies any pain but states he is cold. 





Physical Exam


Vital Signs: 


 











Temp Pulse Resp BP Pulse Ox


 


 98.6 F   94   17   153/80 H  97 


 


 11/21/17 03:25  11/21/17 07:00  11/21/17 03:25  11/21/17 03:25  11/21/17 03:25








 Intake & Output











 11/20/17 11/21/17 11/22/17





 06:59 06:59 06:59


 


Intake Total 1805 1962 


 


Balance 1805 1962 


 


Weight 70.7 kg 71.3 kg 











General appearance: PRESENT: no acute distress, well-developed, well-nourished


Head exam: PRESENT: normocephalic


Eye exam: PRESENT: EOMI.  ABSENT: scleral icterus


Ear exam: PRESENT: normal external ear exam


Mouth exam: PRESENT: moist, other - food residue in his mouth


Neck exam: ABSENT: carotid bruit, JVD, lymphadenopathy, thyromegaly


Respiratory exam: PRESENT: clear to auscultation alexandra.  ABSENT: rales, rhonchi, 

wheezes


Cardiovascular exam: PRESENT: RRR.  ABSENT: diastolic murmur, rubs, systolic 

murmur


Pulses: PRESENT: normal dorsalis pedis pul


Vascular exam: PRESENT: normal capillary refill


GI/Abdominal exam: PRESENT: normal bowel sounds, soft.  ABSENT: distended, 

guarding, mass, organolmegaly, rebound, tenderness


Rectal exam: PRESENT: deferred


Extremities exam: PRESENT: full ROM.  ABSENT: calf tenderness, clubbing, pedal 

edema


Neurological exam: PRESENT: alert, awake, oriented to person, oriented to place

, oriented to time, oriented to situation, CN II-XII grossly intact.  ABSENT: 

motor sensory deficit


Psychiatric exam: PRESENT: appropriate affect, normal mood.  ABSENT: homicidal 

ideation, suicidal ideation


Skin exam: PRESENT: dry, intact, warm.  ABSENT: cyanosis, rash





Results


Laboratory Results: 


 





 11/20/17 07:56 





 11/20/17 07:56 





 











  11/20/17 11/20/17





  07:56 07:56


 


WBC  6.6 


 


RBC  3.15 L 


 


Hgb  8.8 L 


 


Hct  26.3 L 


 


MCV  84 


 


MCH  28.1 


 


MCHC  33.6 


 


RDW  17.5 H 


 


Plt Count  173 


 


Seg Neutrophils %  67.9 


 


Lymphocytes %  19.3 


 


Monocytes %  11.1 


 


Eosinophils %  1.0 


 


Basophils %  0.7 


 


Absolute Neutrophils  4.5 


 


Absolute Lymphocytes  1.3 


 


Absolute Monocytes  0.7 


 


Absolute Eosinophils  0.1 


 


Absolute Basophils  0.0 


 


Sodium   138.0


 


Potassium   3.8


 


Chloride   105


 


Carbon Dioxide   25


 


Anion Gap   8


 


BUN   28 H


 


Creatinine   1.71 H


 


Est GFR ( Amer)   47 L


 


Est GFR (Non-Af Amer)   39 L


 


Glucose   88


 


Calcium   8.5











Impressions: 


 





Chest X-Ray  11/03/17 08:59


IMPRESSION:  Minimal bibasilar atelectasis.


 








Shoulder X-Ray  11/03/17 08:59


IMPRESSION:  Osteoporotic.  No acute fracture or malalignment.


 








Chest/Abdomen CTA  11/03/17 12:02


IMPRESSION:  Obstructive lung disease.


Bibasilar atelectasis


No CT angio evidence of acute pulmonary emboli.


 














Assessment & Plan





- Diagnosis


(1) Acute renal injury due to hypovolemia


Is this a current diagnosis for this admission?: Yes   


Plan: 


Acute renal failure due to dehydration. Creatinine was 2.4 and has trended down 

to 1.71.  Will continue with the hydration and monitor.  Patient renal function 

as last normal on 11/09/17 and was 0.09.








(2) Anemia


Qualifiers: 


   Iron deficiency anemia type: other iron deficiency 


Is this a current diagnosis for this admission?: Yes   


Plan: 


Possibly due to chronic disease and or poor nutrition.  Hemoglobin 8.8.  Will 

continue to monitor. FOBT ordered as patient is at risk for GI bleed being on 

xarelto. 








(3) DVT prophylaxis


Is this a current diagnosis for this admission?: Yes   


Plan: 


Patient on xarelto for PE. 








(4) Dehydration with hyponatremia


Is this a current diagnosis for this admission?: Yes   


Plan: 


Now resolved with IV hydration.  Patient encouraged to drink. 








(5) Hypomagnesemia


Is this a current diagnosis for this admission?: Yes   


Plan: 


Resolved was 2.2 as of 11/18. 








(6) Hypotension


Is this a current diagnosis for this admission?: Yes   


Plan: 


Patient blood pressure are on the higher side.  Patient is currently of 

[place]. 








(7) Left shoulder pain


Qualifiers: 


   Chronicity: chronic   Qualified Code(s): M25.512 - Pain in left shoulder; 

G89.29 - Other chronic pain; G89.29 - Other chronic pain   


Is this a current diagnosis for this admission?: Yes   


Plan: 


Conitnue current pain management. X ray of shoulder shows OA. 








(8) Moderate protein-calorie malnutrition


Is this a current diagnosis for this admission?: Yes   


Plan: 


Patient on appetite stimulant and appears to be eating better at times. 








(9) NSTEMI (non-ST elevated myocardial infarction)


Is this a current diagnosis for this admission?: Yes   


Plan: 


Continue with medical management. Patient on metoprolol. No aspirin as patient 

is at risk of bleeding as he is currently on xarelto. 








(10) Sepsis


Is this a current diagnosis for this admission?: Yes   


Plan: 


Secondary to UTI.  Patient was treated with zyvox.  This is resolved. 








(11) UTI (urinary tract infection)


Qualifiers: 


   Urinary tract infection type: acute cystitis   Hematuria presence: with 

hematuria   Qualified Code(s): N30.01 - Acute cystitis with hematuria   


Is this a current diagnosis for this admission?: Yes   





(12) Pulmonary emboli


Qualifiers: 


   Pulmonary embolism type: other 


Is this a current diagnosis for this admission?: No   


Plan: 


Continue with xarelto. 








- Time


Time Spent with patient: Less than 15 minutes


Anticipated discharge: SNF, Hospice


Within: when bed available





- Inpatient Certification


Medical Necessity: Other - Awaiting placement

## 2017-11-23 RX ADMIN — METOPROLOL SUCCINATE SCH MG: 50 TABLET, EXTENDED RELEASE ORAL at 21:02

## 2017-11-23 RX ADMIN — GABAPENTIN SCH MG: 100 CAPSULE ORAL at 06:10

## 2017-11-23 RX ADMIN — GABAPENTIN SCH MG: 100 CAPSULE ORAL at 21:01

## 2017-11-23 RX ADMIN — RIVAROXABAN SCH MG: 10 TABLET, FILM COATED ORAL at 11:23

## 2017-11-23 RX ADMIN — TIZANIDINE SCH MG: 4 TABLET ORAL at 21:02

## 2017-11-23 RX ADMIN — GABAPENTIN SCH MG: 100 CAPSULE ORAL at 17:02

## 2017-11-23 RX ADMIN — FLUDROCORTISONE ACETATE SCH MG: 0.1 TABLET ORAL at 11:26

## 2017-11-23 RX ADMIN — METOPROLOL SUCCINATE SCH MG: 50 TABLET, EXTENDED RELEASE ORAL at 11:23

## 2017-11-23 RX ADMIN — MEGESTROL ACETATE SCH MG: 40 SUSPENSION ORAL at 11:25

## 2017-11-23 RX ADMIN — TAMSULOSIN HYDROCHLORIDE SCH MG: 0.4 CAPSULE ORAL at 17:04

## 2017-11-23 RX ADMIN — ESCITALOPRAM OXALATE SCH MG: 10 TABLET, FILM COATED ORAL at 21:01

## 2017-11-23 RX ADMIN — TIZANIDINE SCH MG: 4 TABLET ORAL at 11:25

## 2017-11-23 RX ADMIN — ACETAMINOPHEN PRN MG: 325 TABLET ORAL at 11:29

## 2017-11-23 RX ADMIN — LANSOPRAZOLE SCH MG: 30 TABLET, ORALLY DISINTEGRATING, DELAYED RELEASE ORAL at 06:10

## 2017-11-23 RX ADMIN — MORPHINE SULFATE PRN MG: 10 SOLUTION ORAL at 21:06

## 2017-11-23 RX ADMIN — LEVOTHYROXINE SODIUM SCH MG: 25 TABLET ORAL at 06:10

## 2017-11-23 NOTE — PDOC PROGRESS REPORT
Subjective


Progress Note for:: 11/23/17


Subjective:: 





Patient is a 82 year old male who presented multiple time to the ED fo pain.  

Patient has had polymicrobial UTI and was treated with zyvox.  Patient is 

awaiting placement in a nursing home with hospice. Seen earlier today. Patient 

sitting up bed stating he did not rest much.Patient wants to sit up more in the 

bed.





Physical Exam


Vital Signs: 


 











Temp Pulse Resp BP Pulse Ox


 


 98.0 F   96   20   105/57 L  97 


 


 11/23/17 16:00  11/23/17 16:00  11/23/17 16:00  11/23/17 16:00  11/23/17 16:00








 Intake & Output











 11/22/17 11/23/17 11/24/17





 06:59 06:59 06:59


 


Intake Total 1384 627 829


 


Balance 1384 627 829


 


Weight 72.6 kg 70.3 kg 











General appearance: PRESENT: no acute distress, thin


Head exam: PRESENT: atraumatic, normocephalic


Eye exam: PRESENT: EOMI.  ABSENT: scleral icterus


Mouth exam: PRESENT: moist, neck supple


Neck exam: ABSENT: carotid bruit, JVD, lymphadenopathy, thyromegaly


Respiratory exam: PRESENT: clear to auscultation alexandra.  ABSENT: rales, rhonchi, 

wheezes


Cardiovascular exam: PRESENT: RRR.  ABSENT: diastolic murmur, rubs, systolic 

murmur


GI/Abdominal exam: PRESENT: normal bowel sounds, soft.  ABSENT: distended, 

guarding, mass, organolmegaly, rebound, tenderness


Rectal exam: PRESENT: deferred


Extremities exam: PRESENT: full ROM.  ABSENT: calf tenderness, clubbing, pedal 

edema


Neurological exam: PRESENT: alert, awake, oriented to person, oriented to place

, CN II-XII grossly intact.  ABSENT: motor sensory deficit


Psychiatric exam: PRESENT: appropriate affect, normal mood.  ABSENT: homicidal 

ideation, suicidal ideation


Skin exam: PRESENT: dry, intact, warm.  ABSENT: cyanosis, rash





Results


Laboratory Results: 


 





 11/22/17 06:35 





 11/22/17 06:35 








Impressions: 


 





Chest X-Ray  11/03/17 08:59


IMPRESSION:  Minimal bibasilar atelectasis.


 








Shoulder X-Ray  11/03/17 08:59


IMPRESSION:  Osteoporotic.  No acute fracture or malalignment.


 








Chest/Abdomen CTA  11/03/17 12:02


IMPRESSION:  Obstructive lung disease.


Bibasilar atelectasis


No CT angio evidence of acute pulmonary emboli.


 














Assessment & Plan





- Diagnosis


(1) Acute renal injury due to hypovolemia


Is this a current diagnosis for this admission?: Yes   


Plan: 


Acute renal failure due to dehydration and or CTA with contrast. Creatinine 

continues to gradually trend down with fluids. Patient renal function as last 

normal on 11/09/17 and was 0.09. Monitor.








(2) Anemia


Qualifiers: 


   Iron deficiency anemia type: other iron deficiency 


Is this a current diagnosis for this admission?: Yes   


Plan: 


Possibly due to chronic disease and or poor nutrition.  Hemoglobin trending up 

from 8.8. to 9.8.  Will continue to monitor. FOBT ordered as patient is at risk 

for GI bleed being on xarelto. Anemia work up in negative.








(3) DVT prophylaxis


Is this a current diagnosis for this admission?: Yes   


Plan: 


Patient on xarelto for PE. 








(4) Dehydration with hyponatremia


Is this a current diagnosis for this admission?: Yes   


Plan: 


Now resolved with IV hydration.  








(5) Hypomagnesemia


Is this a current diagnosis for this admission?: Yes   


Plan: 


Possibly due to poor dietary intake. Resolved. Will check chemistry labs in the 

am.








(6) Hypotension


Is this a current diagnosis for this admission?: Yes   


Plan: 


Labile; however, patient is hypotensive less often.  Patient is currently of 

[place]. 








(7) Left shoulder pain


Qualifiers: 


   Chronicity: chronic   Qualified Code(s): M25.512 - Pain in left shoulder; 

G89.29 - Other chronic pain; G89.29 - Other chronic pain   


Is this a current diagnosis for this admission?: Yes   


Plan: 


Conitnue current pain management. X ray of shoulder shows OA. 








(8) Moderate protein-calorie malnutrition


Is this a current diagnosis for this admission?: Yes   


Plan: 


Patient on appetite stimulant.  Patient will eat more at times.








(9) NSTEMI (non-ST elevated myocardial infarction)


Is this a current diagnosis for this admission?: No   


Plan: 


Continue with medical management. Patient on metoprolol and baby aspirin.








(10) Sepsis


Is this a current diagnosis for this admission?: Yes   


Plan: 


Secondary to UTI.  Patient was treated with zyvox.  Now resolved. 








(11) UTI (urinary tract infection)


Qualifiers: 


   Urinary tract infection type: acute cystitis   Hematuria presence: with 

hematuria   Qualified Code(s): N30.01 - Acute cystitis with hematuria   


Is this a current diagnosis for this admission?: Yes   


Plan: 


Patient treated with zyvox.








(12) Pulmonary emboli


Qualifiers: 


   Pulmonary embolism type: other 


Is this a current diagnosis for this admission?: No   


Plan: 


Continue with xarelto. 








(13) Tachycardia


Is this a current diagnosis for this admission?: Yes   


Plan: 


Appear sinus in nature.  Continue toprolol bid.








- Time


Time Spent with patient: Less than 15 minutes


Anticipated discharge: SNF


Within: when bed available

## 2017-11-23 NOTE — PDOC PROGRESS REPORT
Subjective


Progress Note for:: 11/22/17


Subjective:: 





Patient is a 82 year old male who presented multiple time to the ED fo pain.  

Patient has had polymicrobial UTI and was treated with zyvox.  Patient is 

awaiting placement in a nursing home with hospice. Patient resting comfortably.





Physical Exam


Vital Signs: 


 











Temp Pulse Resp BP Pulse Ox


 


 99.1 F   95   22 H  118/61   94 


 


 11/22/17 15:37  11/22/17 15:37  11/22/17 15:37  11/22/17 15:37  11/22/17 15:37








 Intake & Output











 11/21/17 11/22/17 11/23/17





 06:59 06:59 06:59


 


Intake Total 1962 1384 340


 


Balance 1962 1384 340


 


Weight 71.3 kg 72.6 kg 











General appearance: PRESENT: no acute distress, thin


Head exam: PRESENT: normocephalic


Eye exam: PRESENT: EOMI.  ABSENT: scleral icterus


Neck exam: ABSENT: carotid bruit, JVD, lymphadenopathy, thyromegaly


Respiratory exam: PRESENT: clear to auscultation alexandra.  ABSENT: rales, rhonchi, 

wheezes


Cardiovascular exam: PRESENT: RRR.  ABSENT: diastolic murmur, rubs, systolic 

murmur


GI/Abdominal exam: PRESENT: normal bowel sounds, soft.  ABSENT: distended, 

guarding, mass, organolmegaly, rebound, tenderness


Rectal exam: PRESENT: deferred


Gentrourinary exam: PRESENT: indwelling catheter


Extremities exam: PRESENT: full ROM.  ABSENT: calf tenderness, clubbing, pedal 

edema


Neurological exam: ABSENT: motor sensory deficit


Psychiatric exam: ABSENT: homicidal ideation, suicidal ideation


Skin exam: PRESENT: dry, intact, warm.  ABSENT: cyanosis, rash





Results


Laboratory Results: 


 





 11/22/17 06:35 





 11/22/17 06:35 





 











  11/22/17 11/22/17





  06:35 06:35


 


WBC   9.8


 


RBC   3.27 L


 


Hgb   9.3 L


 


Hct   27.2 L


 


MCV   83


 


MCH   28.5


 


MCHC   34.3


 


RDW   17.2 H


 


Plt Count   158


 


Seg Neutrophils %   74.1


 


Lymphocytes %   11.6 L


 


Monocytes %   13.5 H


 


Eosinophils %   0.4


 


Basophils %   0.4


 


Absolute Neutrophils   7.3


 


Absolute Lymphocytes   1.1


 


Absolute Monocytes   1.3


 


Absolute Eosinophils   0.0


 


Absolute Basophils   0.0


 


Sodium  138.8 


 


Potassium  3.8 


 


Chloride  101 


 


Carbon Dioxide  25 


 


Anion Gap  13 


 


BUN  24 H 


 


Creatinine  1.64 H 


 


Est GFR ( Amer)  49 L 


 


Est GFR (Non-Af Amer)  41 L 


 


Glucose  80 


 


Calcium  8.8 


 


Magnesium  1.6 











Impressions: 


 





Chest X-Ray  11/03/17 08:59


IMPRESSION:  Minimal bibasilar atelectasis.


 








Shoulder X-Ray  11/03/17 08:59


IMPRESSION:  Osteoporotic.  No acute fracture or malalignment.


 








Chest/Abdomen CTA  11/03/17 12:02


IMPRESSION:  Obstructive lung disease.


Bibasilar atelectasis


No CT angio evidence of acute pulmonary emboli.


 














Assessment & Plan





- Diagnosis


(1) Acute renal injury due to hypovolemia


Is this a current diagnosis for this admission?: Yes   


Plan: 


Acute renal failure due to dehydration and or CTA with contrast. Creatinine 

continues to gradually trend down with fluids. Patient renal function as last 

normal on 11/09/17 and was 0.09. Monitor and avoid nephrotoxins.








(2) Anemia


Qualifiers: 


   Iron deficiency anemia type: other iron deficiency 


Is this a current diagnosis for this admission?: Yes   


Plan: 


Possibly due to chronic disease and or poor nutrition.  Hemoglobin trending up 

from 8.8. to 9.8.  Will continue to monitor. FOBT ordered as patient is at risk 

for GI bleed being on xarelto. Anemia work up in negative.








(3) DVT prophylaxis


Is this a current diagnosis for this admission?: Yes   


Plan: 


Patient on xarelto for PE. 








(4) Dehydration with hyponatremia


Is this a current diagnosis for this admission?: Yes   


Plan: 


Now resolved with IV hydration.  








(5) Hypomagnesemia


Is this a current diagnosis for this admission?: Yes   


Plan: 


Possibly due to poor dietary intake. Resolved.








(6) Hypotension


Is this a current diagnosis for this admission?: Yes   


Plan: 


Labile; however, patient is hypotensive less often.  Patient is currently of 

[place]. 








(7) Left shoulder pain


Qualifiers: 


   Chronicity: chronic   Qualified Code(s): M25.512 - Pain in left shoulder; 

G89.29 - Other chronic pain; G89.29 - Other chronic pain   


Is this a current diagnosis for this admission?: Yes   


Plan: 


Conitnue current pain management. X ray of shoulder shows OA. 








(8) Moderate protein-calorie malnutrition


Is this a current diagnosis for this admission?: Yes   


Plan: 


Patient on appetite stimulant.  Patient will eat more at times.








(9) NSTEMI (non-ST elevated myocardial infarction)


Is this a current diagnosis for this admission?: No   


Plan: 


Continue with medical management. Patient on metoprolol. Will added baby 

aspirin.








(10) Sepsis


Is this a current diagnosis for this admission?: Yes   


Plan: 


Secondary to UTI.  Patient was treated with zyvox.  Now resolved. 








(11) UTI (urinary tract infection)


Qualifiers: 


   Urinary tract infection type: acute cystitis   Hematuria presence: with 

hematuria   Qualified Code(s): N30.01 - Acute cystitis with hematuria   


Is this a current diagnosis for this admission?: Yes   


Plan: 


Patient treated with zyvox.








(12) Pulmonary emboli


Qualifiers: 


   Pulmonary embolism type: other 


Is this a current diagnosis for this admission?: No   


Plan: 


Continue with xarelto. 








(13) Tachycardia


Is this a current diagnosis for this admission?: Yes   


Plan: 


Appear sinus in nature.  Increased patients toprolol to bid.








- Time


Time Spent with patient: Less than 15 minutes


Medications reviewed and adjusted accordingly: Yes


Anticipated discharge: SNF


Within: when bed available

## 2017-11-24 LAB
ANION GAP SERPL CALC-SCNC: 13 MMOL/L (ref 5–19)
BASOPHILS # BLD AUTO: 0 10^3/UL (ref 0–0.2)
BASOPHILS NFR BLD AUTO: 0.3 % (ref 0–2)
BUN SERPL-MCNC: 30 MG/DL (ref 7–20)
CALCIUM: 8.4 MG/DL (ref 8.4–10.2)
CHLORIDE SERPL-SCNC: 100 MMOL/L (ref 98–107)
CO2 SERPL-SCNC: 26 MMOL/L (ref 22–30)
CREAT SERPL-MCNC: 1.78 MG/DL (ref 0.52–1.25)
EOSINOPHIL # BLD AUTO: 0.1 10^3/UL (ref 0–0.6)
EOSINOPHIL NFR BLD AUTO: 0.8 % (ref 0–6)
ERYTHROCYTE [DISTWIDTH] IN BLOOD BY AUTOMATED COUNT: 17.3 % (ref 11.5–14)
GLUCOSE SERPL-MCNC: 102 MG/DL (ref 75–110)
HCT VFR BLD CALC: 25.3 % (ref 37.9–51)
HGB BLD-MCNC: 8.6 G/DL (ref 13.5–17)
HGB HCT DIFFERENCE: 0.5
LYMPHOCYTES # BLD AUTO: 1.3 10^3/UL (ref 0.5–4.7)
LYMPHOCYTES NFR BLD AUTO: 13.4 % (ref 13–45)
MAGNESIUM SERPL-MCNC: 1.7 MG/DL (ref 1.6–2.3)
MCH RBC QN AUTO: 27.8 PG (ref 27–33.4)
MCHC RBC AUTO-ENTMCNC: 33.9 G/DL (ref 32–36)
MCV RBC AUTO: 82 FL (ref 80–97)
MONOCYTES # BLD AUTO: 1 10^3/UL (ref 0.1–1.4)
MONOCYTES NFR BLD AUTO: 10.6 % (ref 3–13)
NEUTROPHILS # BLD AUTO: 7.2 10^3/UL (ref 1.7–8.2)
NEUTS SEG NFR BLD AUTO: 74.9 % (ref 42–78)
POTASSIUM SERPL-SCNC: 3.8 MMOL/L (ref 3.6–5)
RBC # BLD AUTO: 3.09 10^6/UL (ref 4.35–5.55)
SODIUM SERPL-SCNC: 139.3 MMOL/L (ref 137–145)
WBC # BLD AUTO: 9.6 10^3/UL (ref 4–10.5)

## 2017-11-24 RX ADMIN — GABAPENTIN SCH MG: 100 CAPSULE ORAL at 06:53

## 2017-11-24 RX ADMIN — GABAPENTIN SCH MG: 100 CAPSULE ORAL at 22:20

## 2017-11-24 RX ADMIN — METOPROLOL SUCCINATE SCH MG: 50 TABLET, EXTENDED RELEASE ORAL at 22:19

## 2017-11-24 RX ADMIN — MEGESTROL ACETATE SCH MG: 40 SUSPENSION ORAL at 09:39

## 2017-11-24 RX ADMIN — LEVOTHYROXINE SODIUM SCH MG: 25 TABLET ORAL at 06:52

## 2017-11-24 RX ADMIN — ESCITALOPRAM OXALATE SCH MG: 10 TABLET, FILM COATED ORAL at 22:20

## 2017-11-24 RX ADMIN — ASPIRIN SCH MG: 81 TABLET, COATED ORAL at 09:39

## 2017-11-24 RX ADMIN — RIVAROXABAN SCH MG: 10 TABLET, FILM COATED ORAL at 07:46

## 2017-11-24 RX ADMIN — LANSOPRAZOLE SCH MG: 30 TABLET, ORALLY DISINTEGRATING, DELAYED RELEASE ORAL at 06:53

## 2017-11-24 RX ADMIN — TETRAHYDROZOLINE HCL 0.05% PRN DROP: 0.05 SOLUTION/ DROPS INTRAOCULAR at 14:18

## 2017-11-24 RX ADMIN — FLUDROCORTISONE ACETATE SCH MG: 0.1 TABLET ORAL at 09:39

## 2017-11-24 RX ADMIN — TAMSULOSIN HYDROCHLORIDE SCH MG: 0.4 CAPSULE ORAL at 18:04

## 2017-11-24 RX ADMIN — METOPROLOL SUCCINATE SCH MG: 50 TABLET, EXTENDED RELEASE ORAL at 09:39

## 2017-11-24 RX ADMIN — TIZANIDINE SCH MG: 4 TABLET ORAL at 22:20

## 2017-11-24 RX ADMIN — GABAPENTIN SCH MG: 100 CAPSULE ORAL at 14:16

## 2017-11-24 RX ADMIN — TIZANIDINE SCH MG: 4 TABLET ORAL at 09:39

## 2017-11-25 RX ADMIN — TIZANIDINE SCH MG: 4 TABLET ORAL at 10:00

## 2017-11-25 RX ADMIN — GABAPENTIN SCH MG: 100 CAPSULE ORAL at 13:52

## 2017-11-25 RX ADMIN — METOPROLOL SUCCINATE SCH MG: 50 TABLET, EXTENDED RELEASE ORAL at 10:00

## 2017-11-25 RX ADMIN — RIVAROXABAN SCH MG: 10 TABLET, FILM COATED ORAL at 10:00

## 2017-11-25 RX ADMIN — GABAPENTIN SCH MG: 100 CAPSULE ORAL at 05:34

## 2017-11-25 RX ADMIN — GABAPENTIN SCH MG: 100 CAPSULE ORAL at 22:31

## 2017-11-25 RX ADMIN — LANSOPRAZOLE SCH MG: 30 TABLET, ORALLY DISINTEGRATING, DELAYED RELEASE ORAL at 05:34

## 2017-11-25 RX ADMIN — ASPIRIN SCH MG: 81 TABLET, COATED ORAL at 09:58

## 2017-11-25 RX ADMIN — FLUDROCORTISONE ACETATE SCH MG: 0.1 TABLET ORAL at 10:00

## 2017-11-25 RX ADMIN — LEVOTHYROXINE SODIUM SCH MG: 25 TABLET ORAL at 05:34

## 2017-11-25 RX ADMIN — TIZANIDINE SCH MG: 4 TABLET ORAL at 22:31

## 2017-11-25 RX ADMIN — METOPROLOL SUCCINATE SCH MG: 50 TABLET, EXTENDED RELEASE ORAL at 22:31

## 2017-11-25 RX ADMIN — ESCITALOPRAM OXALATE SCH MG: 10 TABLET, FILM COATED ORAL at 22:31

## 2017-11-25 RX ADMIN — MEGESTROL ACETATE SCH MG: 40 SUSPENSION ORAL at 10:00

## 2017-11-25 RX ADMIN — TAMSULOSIN HYDROCHLORIDE SCH MG: 0.4 CAPSULE ORAL at 17:55

## 2017-11-25 NOTE — PDOC PROGRESS REPORT
Subjective


Progress Note for:: 11/25/17


Subjective:: 


Her Abad is an 82-year-old male who presented to the emergency department 7 

times complaining of shoulder pain.  During this hospitalization he has grown 

staph epidermidis from 1 blood culture.  He has also had a repeat blood culture 

that is negative.  His urine culture has grown E. coli and Enterococcus 

faecalis followed by enterococcus raffinosis.  Antibiotic therapy has been 

completed.  The patient is awaiting placement.  Yesterday and today he was 

complaining of some ear wax.  Normally, at home he cleans his ears himself.  He 

is not having any trouble hearing.  I told him that we should not put anything 

in the ears due to the risk of trauma.  He appeared to agree with this.  

Otherwise, he is without complaints today.





Physical Exam


Vital Signs: 


 











Temp Pulse Resp BP Pulse Ox


 


 98.6 F   107 H  16   156/82 H  96 


 


 11/25/17 08:46  11/25/17 08:46  11/25/17 08:46  11/25/17 08:46  11/25/17 08:46








 Intake & Output











 11/24/17 11/25/17 11/26/17





 06:59 06:59 06:59


 


Intake Total 1189 1437 


 


Output Total 500  


 


Balance 689 1437 


 


Weight 70.3 kg 70.3 kg 











Additional comments: 


The patient is laying in bed partially inclined.  He is awake but appears to be 

resting comfortably.  His cognition and mentation are appropriate.  His facial 

appearance is normal.  His lungs demonstrate clear breath sounds both 

anteriorly and in the mid axillary lines.  His cardiac exam is regular.  I do 

not appreciate any murmurs, gallops or rubs.  The abdomen is soft and flat.  

Bowel sounds are present.  The lower extremities demonstrate trace edema.  The 

skin is warm dry and intact.








Results


Laboratory Results: 


 





 11/24/17 04:12 





 11/24/17 04:12 








Impressions: 


 





Chest X-Ray  11/03/17 08:59


IMPRESSION:  Minimal bibasilar atelectasis.


 








Shoulder X-Ray  11/03/17 08:59


IMPRESSION:  Osteoporotic.  No acute fracture or malalignment.


 








Chest/Abdomen CTA  11/03/17 12:02


IMPRESSION:  Obstructive lung disease.


Bibasilar atelectasis


No CT angio evidence of acute pulmonary emboli.


 














Assessment & Plan





- Diagnosis


(1) Anemia


Qualifiers: 


   Iron deficiency anemia type: other iron deficiency 


Is this a current diagnosis for this admission?: Yes   





(2) Chest pain


Qualifiers: 


   Chest pain type: unspecified   Qualified Code(s): R07.9 - Chest pain, 

unspecified   


Is this a current diagnosis for this admission?: Yes   





(3) Chronic pain


Is this a current diagnosis for this admission?: Yes   





(4) DNR (do not resuscitate)


Is this a current diagnosis for this admission?: Yes   





(5) Hypotension


Is this a current diagnosis for this admission?: Yes   





(6) Left shoulder pain


Qualifiers: 


   Chronicity: chronic   Qualified Code(s): M25.512 - Pain in left shoulder; 

G89.29 - Other chronic pain; G89.29 - Other chronic pain   


Is this a current diagnosis for this admission?: Yes   





(7) NSTEMI (non-ST elevated myocardial infarction)


Is this a current diagnosis for this admission?: No   





(8) Septicemia


Is this a current diagnosis for this admission?: Yes   





(9) Tachycardia


Is this a current diagnosis for this admission?: Yes   





(10) UTI (urinary tract infection)


Qualifiers: 


   Urinary tract infection type: acute cystitis   Hematuria presence: with 

hematuria   Qualified Code(s): N30.01 - Acute cystitis with hematuria   


Is this a current diagnosis for this admission?: Yes   





(11) Weakness


Is this a current diagnosis for this admission?: Yes   





(12) Pulmonary emboli


Qualifiers: 


   Pulmonary embolism type: other 


Is this a current diagnosis for this admission?: No   








- Plan Summary


Plan Summary: 


The patient's pain appears largely to be musculoskeletal.  His chest pain and 

left shoulder pain appear to be secondary to arthritis.  Currently, the patient'

s pain is under good control. Last week, doses of pain medications were lowered 

due to lethary and low blood pressure. This appears to be largely improved. The 

patient had complications due to sepsis this hospitalization.  His septicemia 

was secondary to his urinary tract infection (bacteria outlined above) and 

likely his blood cultures which were positive for staph epidermidis.  He has 

completed therapy with zyvox.  The patient had a type II non-ST elevation 

myocardial infarction during this hospitalization.  He remains on Xarelto for 

pulmonary emboli.  Discharge planning is working on disposition.

## 2017-11-26 LAB
CK MB SERPL-MCNC: 0.38 NG/ML (ref ?–4.55)
TROPONIN I SERPL-MCNC: 0.02 NG/ML

## 2017-11-26 RX ADMIN — LEVOTHYROXINE SODIUM SCH MG: 25 TABLET ORAL at 05:40

## 2017-11-26 RX ADMIN — METOPROLOL SUCCINATE SCH MG: 50 TABLET, EXTENDED RELEASE ORAL at 21:39

## 2017-11-26 RX ADMIN — MEGESTROL ACETATE SCH MG: 40 SUSPENSION ORAL at 09:24

## 2017-11-26 RX ADMIN — GABAPENTIN SCH MG: 100 CAPSULE ORAL at 05:40

## 2017-11-26 RX ADMIN — ACETAMINOPHEN PRN MG: 325 TABLET ORAL at 02:45

## 2017-11-26 RX ADMIN — GABAPENTIN SCH MG: 100 CAPSULE ORAL at 13:30

## 2017-11-26 RX ADMIN — FLUDROCORTISONE ACETATE SCH MG: 0.1 TABLET ORAL at 09:24

## 2017-11-26 RX ADMIN — TIZANIDINE SCH MG: 4 TABLET ORAL at 21:39

## 2017-11-26 RX ADMIN — LANSOPRAZOLE SCH MG: 30 TABLET, ORALLY DISINTEGRATING, DELAYED RELEASE ORAL at 05:40

## 2017-11-26 RX ADMIN — ESCITALOPRAM OXALATE SCH MG: 10 TABLET, FILM COATED ORAL at 21:39

## 2017-11-26 RX ADMIN — METOPROLOL SUCCINATE SCH MG: 50 TABLET, EXTENDED RELEASE ORAL at 09:23

## 2017-11-26 RX ADMIN — TAMSULOSIN HYDROCHLORIDE SCH MG: 0.4 CAPSULE ORAL at 17:23

## 2017-11-26 RX ADMIN — RIVAROXABAN SCH MG: 10 TABLET, FILM COATED ORAL at 07:45

## 2017-11-26 RX ADMIN — TIZANIDINE SCH MG: 4 TABLET ORAL at 09:23

## 2017-11-26 RX ADMIN — GABAPENTIN SCH MG: 100 CAPSULE ORAL at 21:39

## 2017-11-26 RX ADMIN — ASPIRIN SCH MG: 81 TABLET, COATED ORAL at 09:23

## 2017-11-26 RX ADMIN — ACETAMINOPHEN PRN MG: 325 TABLET ORAL at 09:28

## 2017-11-26 NOTE — PDOC PROGRESS REPORT
Subjective


Progress Note for:: 11/26/17


Subjective:: 


Her bAad is an 82-year-old male who presented to the emergency department 7 

times complaining of shoulder pain.  During this hospitalization he has grown 

staph epidermidis from 1 blood culture.  He has also had a repeat blood culture 

that is negative.  His urine culture has grown E. coli and Enterococcus 

faecalis followed by enterococcus raffinosis.  Antibiotic therapy has been 

completed.  The patient is awaiting placement to SNF for hospice. 





Physical Exam


Vital Signs: 


 











Temp Pulse Resp BP Pulse Ox


 


 99.2 F   98   16   122/61   98 


 


 11/26/17 12:18  11/26/17 12:18  11/26/17 12:18  11/26/17 12:18  11/26/17 12:18








 Intake & Output











 11/25/17 11/26/17 11/27/17





 06:59 06:59 06:59


 


Intake Total 1437 961 


 


Balance 1437 961 


 


Weight 70.3 kg 71.7 kg 











Additional comments: 


Patient was alert and awake this morning.  He did not verbalize any complaints.

  His facial appearance is normal.  His lungs are noted to be clear 

bilaterally.  His cardiac exam is regular without murmurs, gallops rubs.  The 

abdomen is soft and flat.  Bowel sounds are present.  The patient has trace to 1

+ edema in the lower extremities.  Skin is clean, warm, dry and intact.








Results


Laboratory Results: 


 





 11/24/17 04:12 





 11/24/17 04:12 








Impressions: 


 





Chest X-Ray  11/03/17 08:59


IMPRESSION:  Minimal bibasilar atelectasis.


 








Shoulder X-Ray  11/03/17 08:59


IMPRESSION:  Osteoporotic.  No acute fracture or malalignment.


 








Chest/Abdomen CTA  11/03/17 12:02


IMPRESSION:  Obstructive lung disease.


Bibasilar atelectasis


No CT angio evidence of acute pulmonary emboli.


 














Assessment & Plan





- Diagnosis


(1) Anemia


Qualifiers: 


   Iron deficiency anemia type: other iron deficiency 


Is this a current diagnosis for this admission?: Yes   





(2) Chest pain


Qualifiers: 


   Chest pain type: unspecified   Qualified Code(s): R07.9 - Chest pain, 

unspecified   


Is this a current diagnosis for this admission?: Yes   





(3) Chronic pain


Is this a current diagnosis for this admission?: Yes   





(4) DNR (do not resuscitate)


Is this a current diagnosis for this admission?: Yes   





(5) Hypotension


Is this a current diagnosis for this admission?: Yes   





(6) Left shoulder pain


Qualifiers: 


   Chronicity: chronic   Qualified Code(s): M25.512 - Pain in left shoulder; 

G89.29 - Other chronic pain; G89.29 - Other chronic pain   


Is this a current diagnosis for this admission?: Yes   





(7) NSTEMI (non-ST elevated myocardial infarction)


Is this a current diagnosis for this admission?: No   





(8) Septicemia


Is this a current diagnosis for this admission?: Yes   





(9) Tachycardia


Is this a current diagnosis for this admission?: Yes   





(10) UTI (urinary tract infection)


Qualifiers: 


   Urinary tract infection type: acute cystitis   Hematuria presence: with 

hematuria   Qualified Code(s): N30.01 - Acute cystitis with hematuria   


Is this a current diagnosis for this admission?: Yes   





(11) Weakness


Is this a current diagnosis for this admission?: Yes   





(12) Pulmonary emboli


Qualifiers: 


   Pulmonary embolism type: other 


Is this a current diagnosis for this admission?: No   








(14) Acute renal injury due to hypovolemia


Is this a current diagnosis for this admission?: Yes   





- Plan Summary


Plan Summary: 


The patient's pain appears largely to be musculoskeletal.  His chest pain and 

left shoulder pain appear to be secondary to arthritis.  Currently, the patient'

s pain is under good control. Last week, doses of pain medications were lowered

  due to lethary and low blood pressure. Patient is now on zanaflex and 

gabapentin. Morphine has been discontinued (due to persistent hypotension). The 

patient had complications due to sepsis this hospitalization.  His septicemia 

was secondary to his urinary tract infection (bacteria outlined above) and 

likely his blood cultures which were positive for staph epidermidis.  He has 

completed therapy with zyvox.  The patient had a type II non-ST elevation 

myocardial infarction during this hospitalization.  He remains on Xarelto for 

pulmonary emboli.  The patient also suffered acute renal failure during this 

hospitalization. His labs will be repeated in the morning. He received IVF and 

losartan was discntinued. Discharge planning is working on disposition to SNF 

for hospice.

## 2017-11-27 LAB
ANION GAP SERPL CALC-SCNC: 12 MMOL/L (ref 5–19)
BASOPHILS # BLD AUTO: 0 10^3/UL (ref 0–0.2)
BASOPHILS # BLD AUTO: 0.1 10^3/UL (ref 0–0.2)
BASOPHILS NFR BLD AUTO: 0.6 % (ref 0–2)
BASOPHILS NFR BLD AUTO: 0.7 % (ref 0–2)
BUN SERPL-MCNC: 29 MG/DL (ref 7–20)
CALCIUM: 8.2 MG/DL (ref 8.4–10.2)
CHLORIDE SERPL-SCNC: 103 MMOL/L (ref 98–107)
CK MB SERPL-MCNC: 0.26 NG/ML (ref ?–4.55)
CK MB SERPL-MCNC: < 0.22 NG/ML (ref ?–4.55)
CK SERPL-CCNC: < 20 U/L (ref 55–170)
CO2 SERPL-SCNC: 22 MMOL/L (ref 22–30)
CREAT SERPL-MCNC: 1.54 MG/DL (ref 0.52–1.25)
EOSINOPHIL # BLD AUTO: 0.1 10^3/UL (ref 0–0.6)
EOSINOPHIL # BLD AUTO: 0.2 10^3/UL (ref 0–0.6)
EOSINOPHIL NFR BLD AUTO: 1.4 % (ref 0–6)
EOSINOPHIL NFR BLD AUTO: 2 % (ref 0–6)
ERYTHROCYTE [DISTWIDTH] IN BLOOD BY AUTOMATED COUNT: 16.8 % (ref 11.5–14)
ERYTHROCYTE [DISTWIDTH] IN BLOOD BY AUTOMATED COUNT: 17.3 % (ref 11.5–14)
GLUCOSE SERPL-MCNC: 84 MG/DL (ref 75–110)
HCT VFR BLD CALC: 22.3 % (ref 37.9–51)
HCT VFR BLD CALC: 27.9 % (ref 37.9–51)
HGB BLD-MCNC: 7.7 G/DL (ref 13.5–17)
HGB BLD-MCNC: 9.4 G/DL (ref 13.5–17)
HGB HCT DIFFERENCE: 0.3
HGB HCT DIFFERENCE: 0.8
LYMPHOCYTES # BLD AUTO: 1.2 10^3/UL (ref 0.5–4.7)
LYMPHOCYTES # BLD AUTO: 1.5 10^3/UL (ref 0.5–4.7)
LYMPHOCYTES NFR BLD AUTO: 15.2 % (ref 13–45)
LYMPHOCYTES NFR BLD AUTO: 20.1 % (ref 13–45)
MAGNESIUM SERPL-MCNC: 1.6 MG/DL (ref 1.6–2.3)
MCH RBC QN AUTO: 27.8 PG (ref 27–33.4)
MCH RBC QN AUTO: 28.2 PG (ref 27–33.4)
MCHC RBC AUTO-ENTMCNC: 33.9 G/DL (ref 32–36)
MCHC RBC AUTO-ENTMCNC: 34.7 G/DL (ref 32–36)
MCV RBC AUTO: 82 FL (ref 80–97)
MCV RBC AUTO: 82 FL (ref 80–97)
MONOCYTES # BLD AUTO: 0.9 10^3/UL (ref 0.1–1.4)
MONOCYTES # BLD AUTO: 1 10^3/UL (ref 0.1–1.4)
MONOCYTES NFR BLD AUTO: 11.5 % (ref 3–13)
MONOCYTES NFR BLD AUTO: 12.3 % (ref 3–13)
NEUTROPHILS # BLD AUTO: 5 10^3/UL (ref 1.7–8.2)
NEUTROPHILS # BLD AUTO: 5.6 10^3/UL (ref 1.7–8.2)
NEUTS SEG NFR BLD AUTO: 65.7 % (ref 42–78)
NEUTS SEG NFR BLD AUTO: 70.5 % (ref 42–78)
POTASSIUM SERPL-SCNC: 3.6 MMOL/L (ref 3.6–5)
RBC # BLD AUTO: 2.73 10^6/UL (ref 4.35–5.55)
RBC # BLD AUTO: 3.39 10^6/UL (ref 4.35–5.55)
SODIUM SERPL-SCNC: 136.7 MMOL/L (ref 137–145)
TROPONIN I SERPL-MCNC: 0.01 NG/ML
TROPONIN I SERPL-MCNC: 0.02 NG/ML
WBC # BLD AUTO: 7.6 10^3/UL (ref 4–10.5)
WBC # BLD AUTO: 7.9 10^3/UL (ref 4–10.5)

## 2017-11-27 PROCEDURE — 30233N1 TRANSFUSION OF NONAUTOLOGOUS RED BLOOD CELLS INTO PERIPHERAL VEIN, PERCUTANEOUS APPROACH: ICD-10-PCS | Performed by: INTERNAL MEDICINE

## 2017-11-27 RX ADMIN — RIVAROXABAN SCH MG: 10 TABLET, FILM COATED ORAL at 08:07

## 2017-11-27 RX ADMIN — METOPROLOL SUCCINATE SCH MG: 50 TABLET, EXTENDED RELEASE ORAL at 22:31

## 2017-11-27 RX ADMIN — ESCITALOPRAM OXALATE SCH MG: 10 TABLET, FILM COATED ORAL at 22:31

## 2017-11-27 RX ADMIN — GABAPENTIN SCH MG: 100 CAPSULE ORAL at 22:31

## 2017-11-27 RX ADMIN — METOPROLOL SUCCINATE SCH MG: 50 TABLET, EXTENDED RELEASE ORAL at 09:42

## 2017-11-27 RX ADMIN — TIZANIDINE SCH MG: 4 TABLET ORAL at 09:42

## 2017-11-27 RX ADMIN — GABAPENTIN SCH MG: 100 CAPSULE ORAL at 05:45

## 2017-11-27 RX ADMIN — TIZANIDINE SCH MG: 4 TABLET ORAL at 22:30

## 2017-11-27 RX ADMIN — GABAPENTIN SCH MG: 100 CAPSULE ORAL at 14:32

## 2017-11-27 RX ADMIN — LEVOTHYROXINE SODIUM SCH MG: 25 TABLET ORAL at 05:46

## 2017-11-27 RX ADMIN — LANSOPRAZOLE SCH MG: 30 TABLET, ORALLY DISINTEGRATING, DELAYED RELEASE ORAL at 05:45

## 2017-11-27 RX ADMIN — FLUDROCORTISONE ACETATE SCH MG: 0.1 TABLET ORAL at 09:42

## 2017-11-27 RX ADMIN — MAGNESIUM OXIDE TAB 400 MG (241.3 MG ELEMENTAL MG) SCH MG: 400 (241.3 MG) TAB at 09:42

## 2017-11-27 RX ADMIN — TAMSULOSIN HYDROCHLORIDE SCH MG: 0.4 CAPSULE ORAL at 17:15

## 2017-11-27 RX ADMIN — MEGESTROL ACETATE SCH MG: 40 SUSPENSION ORAL at 09:42

## 2017-11-27 NOTE — PDOC PROGRESS REPORT
Subjective


Progress Note for:: 11/27/17


Subjective:: 


Patient is seen on rounds. He is resting in bed.  He denies any chest pain, 

shortness of breath, or dyspnea.  Denies any nausea, vomiting, or diarrhea.  He 

denies any significant arthralgias or myalgias. Remaining review of systems are 

negative


Reason For Visit: 


PERSISTENT TACHYCARDIA,CHEST PAIN,UTI,ELEVATED








Physical Exam


Vital Signs: 


 











Temp Pulse Resp BP Pulse Ox


 


 98.0 F   85   12   131/61 H  100 


 


 11/27/17 17:12  11/27/17 17:12  11/27/17 17:12  11/27/17 17:12  11/27/17 17:12








 Intake & Output











 11/26/17 11/27/17 11/28/17





 06:59 06:59 06:59


 


Intake Total 961 590 337


 


Balance 961 590 337


 


Weight 71.7 kg 71.6 kg 











General appearance: PRESENT: no acute distress, thin, well-developed, well-

nourished


Head exam: PRESENT: atraumatic, normocephalic


Eye exam: PRESENT: conjunctiva pink, EOMI, PERRLA.  ABSENT: scleral icterus


Ear exam: PRESENT: normal external ear exam


Mouth exam: PRESENT: moist, tongue midline


Neck exam: ABSENT: carotid bruit, JVD, lymphadenopathy, thyromegaly


Respiratory exam: PRESENT: clear to auscultation alexandra.  ABSENT: rales, rhonchi, 

wheezes


Cardiovascular exam: PRESENT: RRR.  ABSENT: diastolic murmur, rubs, systolic 

murmur


Pulses: PRESENT: normal dorsalis pedis pul


Vascular exam: PRESENT: normal capillary refill


GI/Abdominal exam: PRESENT: normal bowel sounds, soft.  ABSENT: distended, 

guarding, mass, organolmegaly, rebound, tenderness


Rectal exam: PRESENT: deferred


Extremities exam: PRESENT: full ROM.  ABSENT: calf tenderness, clubbing, pedal 

edema


Neurological exam: PRESENT: alert, awake, oriented to person, CN II-XII grossly 

intact.  ABSENT: motor sensory deficit


Psychiatric exam: PRESENT: appropriate affect, normal mood.  ABSENT: homicidal 

ideation, suicidal ideation


Skin exam: PRESENT: dry, intact, warm.  ABSENT: cyanosis, rash





Results


Laboratory Results: 


 





 11/27/17 07:07 





 11/27/17 07:07 





 











  11/27/17 11/27/17 11/27/17





  07:07 07:07 11:23


 


WBC  7.6  


 


RBC  2.73 L  


 


Hgb  7.7 L  


 


Hct  22.3 L  


 


MCV  82  


 


MCH  28.2  


 


MCHC  34.7  


 


RDW  17.3 H  


 


Plt Count  395  


 


Seg Neutrophils %  65.7  


 


Lymphocytes %  20.1  


 


Monocytes %  11.5  


 


Eosinophils %  2.0  


 


Basophils %  0.7  


 


Absolute Neutrophils  5.0  


 


Absolute Lymphocytes  1.5  


 


Absolute Monocytes  0.9  


 


Absolute Eosinophils  0.2  


 


Absolute Basophils  0.1  


 


Sodium   136.7 L 


 


Potassium   3.6 


 


Chloride   103 


 


Carbon Dioxide   22 


 


Anion Gap   12 


 


BUN   29 H 


 


Creatinine   1.54 H 


 


Est GFR ( Amer)   53 L 


 


Est GFR (Non-Af Amer)   44 L 


 


Glucose   84 


 


Calcium   8.2 L 


 


Magnesium   1.6 


 


Blood Type    O POSITIVE


 


Antibody Screen    NEGATIVE








 











  11/26/17 11/26/17 11/27/17





  19:30 19:30 01:20


 


Creatine Kinase  21 L   < 20 L


 


CK-MB (CK-2)   0.38 


 


Troponin I   0.024 














  11/27/17 11/27/17 11/27/17





  01:20 07:07 07:07


 


Creatine Kinase   < 20 L 


 


CK-MB (CK-2)  0.26   < 0.22


 


Troponin I  0.019   0.012











Impressions: 


 





Chest X-Ray  11/03/17 08:59


IMPRESSION:  Minimal bibasilar atelectasis.


 








Shoulder X-Ray  11/03/17 08:59


IMPRESSION:  Osteoporotic.  No acute fracture or malalignment.


 








Chest/Abdomen CTA  11/03/17 12:02


IMPRESSION:  Obstructive lung disease.


Bibasilar atelectasis


No CT angio evidence of acute pulmonary emboli.


 














Assessment & Plan





- Diagnosis


(1) Chest pain


Qualifiers: 


   Chest pain type: unspecified   Qualified Code(s): R07.9 - Chest pain, 

unspecified   


Is this a current diagnosis for this admission?: Yes   





(2) Chronic pain


Is this a current diagnosis for this admission?: Yes   





(3) DVT prophylaxis


Is this a current diagnosis for this admission?: Yes   





(4) Anemia


Qualifiers: 


   Anemia type: iron deficiency   Iron deficiency anemia type: other iron 

deficiency   Qualified Code(s): D50.8 - Other iron deficiency anemias   


Is this a current diagnosis for this admission?: Yes   


Plan: 


Patient's hemoglobin dropped from 8.6-7.7.  He has not been on iron 

supplements.  History of iron deficiency anemia.  He also recently was started 

on Xarelto for bilateral PEs.  Will hold this today guaiac stools.  Transfuse 1 

unit packed cells.  Recheck H&H after transfusion.  He will return to [facility] tomorrow if there is no bleeding








(5) Dehydration with hyponatremia


Is this a current diagnosis for this admission?: Yes   


Plan: 


Resolved








(6) Pulmonary emboli


Qualifiers: 


   Pulmonary embolism type: other 


Is this a current diagnosis for this admission?: No   


Plan: 


He has been on Xarelto and will need to be for total of 6-9 months








(7) Underweight


Is this a current diagnosis for this admission?: Yes   


Plan: 


Liberalize diet to regular with supplements








(8) Benign essential hypertension


Is this a current diagnosis for this admission?: Yes   


Plan: 


Continue current medications he is normotensive

## 2017-11-28 VITALS — DIASTOLIC BLOOD PRESSURE: 56 MMHG | SYSTOLIC BLOOD PRESSURE: 104 MMHG

## 2017-11-28 LAB
BASOPHILS # BLD AUTO: 0 10^3/UL (ref 0–0.2)
BASOPHILS # BLD AUTO: 0 10^3/UL (ref 0–0.2)
BASOPHILS NFR BLD AUTO: 0.4 % (ref 0–2)
BASOPHILS NFR BLD AUTO: 0.6 % (ref 0–2)
EOSINOPHIL # BLD AUTO: 0.2 10^3/UL (ref 0–0.6)
EOSINOPHIL # BLD AUTO: 0.2 10^3/UL (ref 0–0.6)
EOSINOPHIL NFR BLD AUTO: 2.2 % (ref 0–6)
EOSINOPHIL NFR BLD AUTO: 2.2 % (ref 0–6)
ERYTHROCYTE [DISTWIDTH] IN BLOOD BY AUTOMATED COUNT: 17 % (ref 11.5–14)
ERYTHROCYTE [DISTWIDTH] IN BLOOD BY AUTOMATED COUNT: 17.3 % (ref 11.5–14)
HCT VFR BLD CALC: 25.5 % (ref 37.9–51)
HCT VFR BLD CALC: 27.2 % (ref 37.9–51)
HGB BLD-MCNC: 9 G/DL (ref 13.5–17)
HGB BLD-MCNC: 9.3 G/DL (ref 13.5–17)
HGB HCT DIFFERENCE: 0.7
HGB HCT DIFFERENCE: 1.5
LYMPHOCYTES # BLD AUTO: 1.1 10^3/UL (ref 0.5–4.7)
LYMPHOCYTES # BLD AUTO: 1.5 10^3/UL (ref 0.5–4.7)
LYMPHOCYTES NFR BLD AUTO: 13.1 % (ref 13–45)
LYMPHOCYTES NFR BLD AUTO: 20.2 % (ref 13–45)
MCH RBC QN AUTO: 28 PG (ref 27–33.4)
MCH RBC QN AUTO: 28.8 PG (ref 27–33.4)
MCHC RBC AUTO-ENTMCNC: 34.3 G/DL (ref 32–36)
MCHC RBC AUTO-ENTMCNC: 35.1 G/DL (ref 32–36)
MCV RBC AUTO: 82 FL (ref 80–97)
MCV RBC AUTO: 82 FL (ref 80–97)
MONOCYTES # BLD AUTO: 1 10^3/UL (ref 0.1–1.4)
MONOCYTES # BLD AUTO: 1.2 10^3/UL (ref 0.1–1.4)
MONOCYTES NFR BLD AUTO: 12.8 % (ref 3–13)
MONOCYTES NFR BLD AUTO: 13.3 % (ref 3–13)
NEUTROPHILS # BLD AUTO: 4.9 10^3/UL (ref 1.7–8.2)
NEUTROPHILS # BLD AUTO: 6.2 10^3/UL (ref 1.7–8.2)
NEUTS SEG NFR BLD AUTO: 64.2 % (ref 42–78)
NEUTS SEG NFR BLD AUTO: 71 % (ref 42–78)
RBC # BLD AUTO: 3.11 10^6/UL (ref 4.35–5.55)
RBC # BLD AUTO: 3.33 10^6/UL (ref 4.35–5.55)
WBC # BLD AUTO: 7.6 10^3/UL (ref 4–10.5)
WBC # BLD AUTO: 8.7 10^3/UL (ref 4–10.5)

## 2017-11-28 RX ADMIN — ASPIRIN SCH MG: 81 TABLET, COATED ORAL at 09:10

## 2017-11-28 RX ADMIN — MAGNESIUM OXIDE TAB 400 MG (241.3 MG ELEMENTAL MG) SCH MG: 400 (241.3 MG) TAB at 09:08

## 2017-11-28 RX ADMIN — RIVAROXABAN SCH MG: 10 TABLET, FILM COATED ORAL at 08:57

## 2017-11-28 RX ADMIN — LANSOPRAZOLE SCH MG: 30 TABLET, ORALLY DISINTEGRATING, DELAYED RELEASE ORAL at 05:46

## 2017-11-28 RX ADMIN — MAGNESIUM SULFATE IN DEXTROSE SCH ML: 10 INJECTION, SOLUTION INTRAVENOUS at 08:11

## 2017-11-28 RX ADMIN — METOPROLOL SUCCINATE SCH MG: 50 TABLET, EXTENDED RELEASE ORAL at 09:10

## 2017-11-28 RX ADMIN — MEGESTROL ACETATE SCH MG: 40 SUSPENSION ORAL at 09:09

## 2017-11-28 RX ADMIN — MAGNESIUM SULFATE IN DEXTROSE SCH ML: 10 INJECTION, SOLUTION INTRAVENOUS at 09:08

## 2017-11-28 RX ADMIN — GABAPENTIN SCH MG: 100 CAPSULE ORAL at 13:43

## 2017-11-28 RX ADMIN — LEVOTHYROXINE SODIUM SCH MG: 25 TABLET ORAL at 05:46

## 2017-11-28 RX ADMIN — FLUDROCORTISONE ACETATE SCH MG: 0.1 TABLET ORAL at 09:09

## 2017-11-28 RX ADMIN — GABAPENTIN SCH MG: 100 CAPSULE ORAL at 05:46

## 2017-11-28 RX ADMIN — TIZANIDINE SCH MG: 4 TABLET ORAL at 09:09

## 2017-11-28 NOTE — PDOC TRANSFER SUMMARY
General





- Admit/Disc Date/PCP


Admission Date/Primary Care Provider: 


  11/04/17 13:30





  





Discharge Date: 11/28/17





- Discharge Diagnosis


(1) Acute renal injury due to hypovolemia


Is this a current diagnosis for this admission?: Yes   





(2) Anemia


Is this a current diagnosis for this admission?: Yes   





(3) DVT prophylaxis


Is this a current diagnosis for this admission?: Yes   





(4) Dehydration with hyponatremia


Is this a current diagnosis for this admission?: Yes   





(5) Hypomagnesemia


Is this a current diagnosis for this admission?: Yes   





(6) Hypotension


Is this a current diagnosis for this admission?: Yes   





(7) Left shoulder pain


Is this a current diagnosis for this admission?: Yes   





(8) Moderate protein-calorie malnutrition


Is this a current diagnosis for this admission?: Yes   





(9) NSTEMI (non-ST elevated myocardial infarction)


Is this a current diagnosis for this admission?: No   





(10) Sepsis


Is this a current diagnosis for this admission?: Yes   





(11) UTI (urinary tract infection)


Is this a current diagnosis for this admission?: Yes   





(12) Pulmonary emboli


Is this a current diagnosis for this admission?: No   





(13) Tachycardia


Is this a current diagnosis for this admission?: Yes   





- Additional Information


Resuscitation Status: Do Not Resuscitate


Discharge Diet: Regular


Discharge Activity: Activity As Tolerated


Home Medications: 








Acetaminophen [Tylenol Extra Strength 500 mg Tablet] 1,000 mg PO Q8 11/03/17 


Amlodipine Besylate [Norvasc 10 mg Tablet] 10 mg PO DAILY 11/03/17 


Cyproheptadine HCl [Periactin 4 mg Tablet] 4 mg PO DAILY 11/03/17 


Escitalopram Oxalate [Lexapro 10 mg Tablet] 10 mg PO QHS 11/03/17 


Levothyroxine Sodium [Synthroid] 25 mcg PO DAILY 11/03/17 


Ondansetron [Zofran Odt] 4 mg PO Q6HP PRN 11/03/17 


Polyethylene Glycol 3350 [Miralax Powder 17 gm/Packet] 1 packet PO DAILY 11/03/ 17 


Rivaroxaban [Xarelto 10 mg Tablet] 20 mg PO DAILY 11/03/17 


Tetrahydrozoline HCl [Visine 0.05% Oph Soln 15 ml] 1 drop OU Q4HP PRN 11/03/17 


Fludrocortisone Acetate [Florinef 0.1 mg Tablet] 0.1 mg PO DAILY 30 Days #30 

tablet 11/28/17 


Gabapentin [Neurontin 100 mg Capsule] 100 mg PO Q8 90 Days #90 capsule 11/28/17 


Iron Polysaccharides Complex [Nu-Iron 150 Capsule] 150 mg PO DAILY@1200 30 Days 

#30 capsule 11/28/17 


Megestrol Acetate [Megace Deana 400 mg/10 ml Udcup] 400 mg PO DAILY 30 Days  udc 

11/28/17 


Metoprolol Succinate [Toprol Xl 50 mg Tab.sr] 50 mg PO DAILY@1700 30 Days #60 

tab.sr.24h 11/28/17 


Oxycodone HCl [Oxy-Ir 5 mg Tablet] 5 mg PO Q6HP PRN 2 Days #8 tablet 11/28/17 


Tamsulosin HCl [Flomax 0.4 mg Cap.sr] 0.4 mg PO PCSUPPER #30 cap.sr.24h 11/28/ 17 


Tizanidine HCl [Zanaflex 4 mg Tablet] 4 mg PO Q12 30 Days #60 tablet 11/28/17 











History of Present Illness


Admission Date/PCP: 


  11/04/17 13:30





  





History of Present Illness: 


RM MCBRIDE is a 82 year old male who was transferred from local nursing home 

facility since again was complaining of chest pain and left shoulder pain. 

Patient admitted and treated for UTI.





Hospital Course


Hospital Course: 


Patient brought in from [facility] with complaint of pain and tachycardia.

  Patient found to have polymicrobial UTI for which she was treated with Zyvox.

  Patient also had acute renal injury due to hypovolemia.  Patient also 

received a CTA with contrast which could also cause acute renal failure.  

Patient creatinine did trend down however did trend back up.  She may have 

underlying CKD stage III which means that patient had acute on chronic renal 

failure possibly secondary to intravascular depletion.  Patient also has iron 

deficient anemia and or anemia of chronic renal disease.  Patient hemoglobin 

did trend down during his hospitalization to 7.7.  This is concerning as 

patient is on Xarelto for pulmonary pulmonary embolism in 2015.  Patient FOBT 

is negative.  Patient was transfused 1 unit of packed RBCs.  Repeat hemoglobin 

is 9.4.  Patient hemoglobin is 9.3 today.  Suspect 7.7 was incorrect.  He was 

hyponatremic and dehydrated.  This did resolve with IV hydration.  Patient also 

had hypomagnesemia which resolved with replacement.  Patient is hypotensive and 

was started on Florinef which has corrected the problem however patient is at 

times hypertensive.  Tachycardia.  Patient has sinus tachycardia.  Patient is 

asymptomatic.  Patient metoprolol was increased to 50 mg p.o. twice daily.  

Patient tachycardia has improved however he is intermittently tachycardic.  

Patient also has a history of chronic left shoulder pain however he is not 

complaining of pain at this time.  Will continue with muscle relaxant, 

gabapentin and as needed pain medication.  Patient also had what look like an 

end STEMI however this could have been a type II MI due to acute on chronic 

renal failure.  Patient currently on beta-blocker.  Aspirin discontinued as 

patient is on Xarelto and is demonstrating an anemia.  No sign of GI bleed was 

noted however patient is at risk for bleeding therefore the patient is no 

longer on baby aspirin.  She is DNR therefore no aggressive intervention was 

done at this time.





Physical Exam


Vital Signs: 


 











Temp Pulse Resp BP Pulse Ox


 


 98.6 F   102 H  14   150/84 H  96 


 


 11/28/17 03:18  11/28/17 07:00  11/28/17 03:18  11/28/17 03:18  11/28/17 03:18








 Intake & Output











 11/27/17 11/28/17 11/29/17





 06:59 06:59 06:59


 


Intake Total 590 540 


 


Balance 590 540 


 


Weight 71.6 kg 71.4 kg 











General appearance: PRESENT: no acute distress, thin, other - Elderly


Head exam: PRESENT: normocephalic


Eye exam: PRESENT: EOMI.  ABSENT: scleral icterus


Mouth exam: PRESENT: moist


Neck exam: ABSENT: carotid bruit, JVD, lymphadenopathy, thyromegaly


Respiratory exam: PRESENT: clear to auscultation alexandra.  ABSENT: rales, rhonchi, 

wheezes


Cardiovascular exam: PRESENT: RRR.  ABSENT: diastolic murmur, rubs, systolic 

murmur


GI/Abdominal exam: PRESENT: normal bowel sounds, soft.  ABSENT: distended, 

guarding, mass, organolmegaly, rebound, tenderness


Rectal exam: PRESENT: deferred


Extremities exam: PRESENT: full ROM.  ABSENT: calf tenderness, clubbing, pedal 

edema


Neurological exam: PRESENT: alert, awake, oriented to person, oriented to place

, oriented to time, oriented to situation, CN II-XII grossly intact.  ABSENT: 

motor sensory deficit


Psychiatric exam: PRESENT: appropriate affect, normal mood.  ABSENT: homicidal 

ideation, suicidal ideation


Skin exam: PRESENT: dry, intact, warm.  ABSENT: cyanosis, rash





Results


Laboratory Results: 


 





 11/28/17 07:16 





 11/27/17 07:07 





 











  11/27/17 11/27/17 11/28/17





  11:23 20:20 07:16


 


WBC   7.9  7.6


 


RBC   3.39 L  3.33 L


 


Hgb   9.4 L  9.3 L


 


Hct   27.9 L  27.2 L


 


MCV   82  82


 


MCH   27.8  28.0


 


MCHC   33.9  34.3


 


RDW   16.8 H  17.0 H


 


Plt Count   476 H  545 H


 


Seg Neutrophils %   70.5  64.2


 


Lymphocytes %   15.2  20.2


 


Monocytes %   12.3  12.8


 


Eosinophils %   1.4  2.2


 


Basophils %   0.6  0.6


 


Absolute Neutrophils   5.6  4.9


 


Absolute Lymphocytes   1.2  1.5


 


Absolute Monocytes   1.0  1.0


 


Absolute Eosinophils   0.1  0.2


 


Absolute Basophils   0.0  0.0


 


Blood Type  O POSITIVE  


 


Antibody Screen  NEGATIVE  








 











  11/26/17 11/26/17 11/27/17





  19:30 19:30 01:20


 


Creatine Kinase  21 L   < 20 L


 


CK-MB (CK-2)   0.38 


 


Troponin I   0.024 














  11/27/17 11/27/17 11/27/17





  01:20 07:07 07:07


 


Creatine Kinase   < 20 L 


 


CK-MB (CK-2)  0.26   < 0.22


 


Troponin I  0.019   0.012











Impressions: 


 





Chest X-Ray  11/03/17 08:59


IMPRESSION:  Minimal bibasilar atelectasis.


 








Shoulder X-Ray  11/03/17 08:59


IMPRESSION:  Osteoporotic.  No acute fracture or malalignment.


 








Chest/Abdomen CTA  11/03/17 12:02


IMPRESSION:  Obstructive lung disease.


Bibasilar atelectasis


No CT angio evidence of acute pulmonary emboli.


 














Transfer Plan





- Disposition


Transfer Plan: 





Patient being discharged by the [facility].  Patient is currently 

medically stable for discharge.  Patient is to continue current medical 

management.  Use of benzos and opiates should be done cautiously as patient is 

prone to oversedation.





- Time Spent with Patient


Time spent with patient: Less than 30 Minutes





Qualifiers


**PATEINT BEING DISCHARGED WITH ANY OF THE FOLLOWING DIAGNOSIS?: MI


MI Pt being discharged on Aspirin therapy?: No


Reason(s) for not prescribing Aspirin therapy:: Compl of medication care


MI Pt being discharged on Statins?: No


Reason(s) for not prescribing Statin therapy:: Compl of medication care


MI Pt discharged ACEI/ARBS?: No


Reason(s) for not prescribing ACEI/ARBS:: Compl of medication care





Plan


Time Spent: Greater than 30 Minutes

## 2017-11-28 NOTE — PDOC H&P
History of Present Illness


Admission Date/PCP: 





November 3, 2017


Patient complains of: Chest pain and left shoulder pain for sometime


History of Present Illness: 


RM MCBRIDE is a 82 year old male who was transferred from local nursing home 

facility since again was complaining of chest pain and left shoulder pain. 

Patient had been seen in this ED seven times with same complaints. However he 

did not present with tachycardia. Daughter was to bedside and relates that 

patient gets this pain in the middle of the chest primarily when is eating. She 

had recommended to be placed straight since it takes him a good amount of time 

to digest the food. Patient states that pain is off on. Patient had been on 

xanax for some time but became he was kept too sedated and unable to 

participate in physical therapy, she requested for this medication to be 

stopped. Daughter also states that patient suffers from pain in his left 

shoulder for a good amount of time. Otherwise he denies shortness of breath, 

fever, chills, problems passing water. In the recent past he used a meza. Due 

to the unusual presentation of tachycardia and finding of urinary tract 

infection our service was contacted and prompted to admit for further 

evaluation. Currently he is anticoagulated with xarelto due to history of 

pulmonary embolism back in 2015.








Past Medical History


Cardiac Medical History: Reports: Coronary Artery Disease, DVT, Hypertension, 

Pulmonary Embolism


   Denies: Myocardial Infarction


Pulmonary Medical History: 


   Denies: Asthma, Bronchitis, Chronic Obstructive Pulmonary Disease (COPD), 

Pneumonia, Tuberculosis


Neurological Medical History: 


   Denies: Seizures


Endocrine Medical History: 


   Denies: Diabetes Mellitus Type 1, Diabetes Mellitus Type 2, Hyperthyroidism, 

Hypothyroidism


Renal/ Medical History: Reports: Other - BPH


GI Medical History: 


   Denies: Hepatitis


Musculoskeltal Medical History: Reports: Arthritis


   Denies: Fibromyalgia


Skin Medical History: 


   Denies: Eczema, Psoriasis


Psychiatric Medical History: 


   Denies: Attention Deficit Hyperactivity Disorder, Bipolar Disorder, 

Depression, Personality Disorder, Schizoaffective Disorder


Traumatic Medical History: 


   Denies: Gunshot Wound, Pneumothorax, Traumatic Brain Injury


Hematology: 


   Denies: Anemia


Infectious Medical History: 


   Denies: Clostridium Difficile, HIV, Methicillin-Resistant Staph Aureus, 

Vancomycin-Resistant Enterococci





Past Surgical History


Past Surgical History: Reports: Appendectomy, Orthopedic Surgery - foot; left 

hip arthroplasty


   Denies: Pacemaker





Social History


Smoking Status: Former Smoker


Frequency of Alcohol Use: None


Hx Recreational Drug Use: No


Hx Prescription Drug Abuse: No





- Advance Directive


Resuscitation Status: Do Not Resuscitate





Family History


Family History: Reviewed & Not Pertinent





Medication/Allergy


Home Medications: 








Acetaminophen [Tylenol Extra Strength 500 mg Tablet] 1,000 mg PO Q8 11/03/17 


Amlodipine Besylate [Norvasc 10 mg Tablet] 10 mg PO DAILY 11/03/17 


Cyproheptadine HCl [Periactin 4 mg Tablet] 4 mg PO DAILY 11/03/17 


Escitalopram Oxalate [Lexapro 10 mg Tablet] 10 mg PO QHS 11/03/17 


Levothyroxine Sodium [Synthroid] 25 mcg PO DAILY 11/03/17 


Ondansetron [Zofran Odt] 4 mg PO Q6HP PRN 11/03/17 


Polyethylene Glycol 3350 [Miralax Powder 17 gm/Packet] 1 packet PO DAILY 11/03/ 17 


Rivaroxaban [Xarelto 10 mg Tablet] 20 mg PO DAILY 11/03/17 


Tetrahydrozoline HCl [Visine 0.05% Oph Soln 15 ml] 1 drop OU Q4HP PRN 11/03/17 


Fludrocortisone Acetate [Florinef 0.1 mg Tablet] 0.1 mg PO DAILY 30 Days #30 

tablet 11/28/17 


Iron Polysaccharides Complex [Nu-Iron 150 Capsule] 150 mg PO DAILY@1200 30 Days 

#30 capsule 11/28/17 


Metoprolol Succinate [Toprol Xl 50 mg Tab.sr] 50 mg PO DAILY@1700 30 Days #60 

tab.sr.24h 11/28/17 


Oxycodone HCl [Oxy-Ir 5 mg Tablet] 5 mg PO Q6HP PRN 2 Days #8 tablet 11/28/17 


Tamsulosin HCl [Flomax 0.4 mg Cap.sr] 0.4 mg PO PCSUPPER #30 cap.sr.24h 11/28/ 17 


Tizanidine HCl [Zanaflex 4 mg Tablet] 4 mg PO Q12 30 Days #60 tablet 11/28/17 


Gabapentin [Neurontin 100 mg Capsule] 100 mg PO Q8H 11/30/17 


Ketotifen Fumarate [Alaway] 1 drop OU Q12H 11/30/17 








Allergies/Adverse Reactions: 


 





No Known Allergies Allergy (Verified 11/30/17 12:57)


 











Review of Systems


Constitutional: ABSENT: fever(s), headache(s), night sweats, weakness


Eyes: ABSENT: visual disturbances


Ears: ABSENT: hearing changes


Nose, Mouth, and Throat: ABSENT: headache(s)


Cardiovascular: PRESENT: chest pain.  ABSENT: dyspnea on exertion, edema, 

palpitations


Respiratory: ABSENT: cough, dyspnea, hemoptysis


Gastrointestinal: ABSENT: abdominal pain, heartburn, nausea, vomiting


Neurological: PRESENT: paresthesias.  ABSENT: dizziness


Psychiatric: PRESENT: anxiety





Physical Exam


Vital Signs: 


 











Temp Pulse Resp BP Pulse Ox


 


 98.2 F   116 H  28 H  150/87 H  100 


 


 11/03/17 08:36  11/03/17 09:19  11/03/17 12:00  11/03/17 11:00  11/03/17 12:00








 Intake & Output











 11/02/17 11/03/17 11/04/17





 06:59 06:59 06:59


 


Weight   65.771 kg











General appearance: PRESENT: no acute distress, cooperative, thin


Head exam: PRESENT: atraumatic, normocephalic


Eye exam: PRESENT: conjunctiva pink, EOMI, PERRLA


Ear exam: PRESENT: normal external ear exam


Mouth exam: PRESENT: moist, neck supple


Teeth exam: PRESENT: edentulous


Neck exam: PRESENT: full ROM.  ABSENT: JVD, tenderness


Respiratory exam: PRESENT: clear to auscultation alexandra


Cardiovascular exam: PRESENT: tachycardia.  ABSENT: diastolic murmur, gallop, 

systolic murmur


Vascular exam: PRESENT: normal capillary refill


GI/Abdominal exam: PRESENT: normal bowel sounds.  ABSENT: distended, firm, 

guarding, tenderness


Extremities exam: ABSENT: joint swelling - tenderness on palpation of left 

shoulder, pedal edema


Neurological exam: PRESENT: alert, awake, oriented to person, oriented to place

, oriented to time


Psychiatric exam: PRESENT: anxious





Results


Laboratory Results: 


 





 11/03/17 10:47 





 11/03/17 10:47 





 











  11/03/17 11/03/17 11/03/17





  10:47 10:47 10:47


 


WBC  10.1  


 


RBC  3.93 L  


 


Hgb  11.1 L  


 


Hct  32.8 L  


 


MCV  83  


 


MCH  28.2  


 


MCHC  33.8  


 


RDW  16.0 H  


 


Plt Count  622 H  


 


Seg Neutrophils %  85.1 H  


 


Lymphocytes %  7.7 L  


 


Monocytes %  6.1  


 


Eosinophils %  0.5  


 


Basophils %  0.6  


 


Absolute Neutrophils  8.6 H  


 


Absolute Lymphocytes  0.8  


 


Absolute Monocytes  0.6  


 


Absolute Eosinophils  0.0  


 


Absolute Basophils  0.1  


 


Sodium   137.8 


 


Potassium   4.9 


 


Chloride   97 L 


 


Carbon Dioxide   27 


 


Anion Gap   14 


 


BUN   12 


 


Creatinine   0.80 


 


Est GFR ( Amer)   > 60 


 


Est GFR (Non-Af Amer)   > 60 


 


Glucose   103 


 


Calcium   9.4 


 


Total Bilirubin   0.6 


 


AST   38 


 


ALT   37 


 


Alkaline Phosphatase   141 H 


 


Total Protein   7.8 


 


Albumin   3.9 


 


Urine Color    YELLOW


 


Urine Appearance    CLOUDY


 


Urine pH    7.0


 


Ur Specific Gravity    1.010


 


Urine Protein    NEGATIVE


 


Urine Glucose (UA)    NEGATIVE


 


Urine Ketones    NEGATIVE


 


Urine Blood    SMALL H


 


Urine Nitrite    POSITIVE H


 


Ur Leukocyte Esterase    LARGE H


 


Urine WBC (Auto)    >182


 


Urine RBC (Auto)    4








 











  11/03/17 11/03/17 11/03/17





  10:47 10:47 13:30


 


Creatine Kinase  56  


 


CK-MB (CK-2)   2.37 


 


Troponin I   0.187  0.176











Impressions: 


 





Chest X-Ray  11/03/17 08:59


IMPRESSION:  Minimal bibasilar atelectasis.


 








Shoulder X-Ray  11/03/17 08:59


IMPRESSION:  Osteoporotic.  No acute fracture or malalignment.


 








Chest/Abdomen CTA  11/03/17 12:02


IMPRESSION:  Obstructive lung disease.


Bibasilar atelectasis


No CT angio evidence of acute pulmonary emboli.


 














Assessment & Plan





- Diagnosis


(1) Chest pain


Qualifiers: 


   Chest pain type: unspecified   Qualified Code(s): R07.9 - Chest pain, 

unspecified   


Is this a current diagnosis for this admission?: Yes   


Plan: 


After talking with daughter appears to be GI related. Due to comorbidities will 

place in telemetry and troponin will be trended.








(2) Left shoulder pain


Qualifiers: 


   Chronicity: chronic   Qualified Code(s): M25.512 - Pain in left shoulder; 

G89.29 - Other chronic pain; G89.29 - Other chronic pain   


Is this a current diagnosis for this admission?: Yes   


Plan: 


Appears to be arthritic. Pain management.








(3) Tachycardia


Is this a current diagnosis for this admission?: Yes   


Plan: 


May be situational. Will place on ivf's and low dose xanax.








(4) UTI (urinary tract infection)


Qualifiers: 


   Urinary tract infection type: acute cystitis   Hematuria presence: with 

hematuria   Qualified Code(s): N30.01 - Acute cystitis with hematuria   


Is this a current diagnosis for this admission?: Yes   


Plan: 


Continue rocephin iv which had been initiated in ED. Add flomax due to history 

of BPH. Track urine culture result.








(5) Pulmonary emboli


Qualifiers: 


   Pulmonary embolism type: other 


Is this a current diagnosis for this admission?: Yes   


Plan: 


Continue xarelto as outpatient








- Time


Time Spent: 50 to 70 Minutes


Medications reviewed and adjusted accordingly: Yes


Anticipated discharge: SNF


Within: within 48 hours





- Inpatient Certification


Based on my medical assessment, after consideration of the patient's 

comorbidities, presenting symptoms, or acuity I expect that the services needed 

warrant INPATIENT care.: No


I certify that my determination is in accordance with my understanding of 

Medicare's requirements for reasonable and necessary INPATIENT services [42 CFR 

412.3e].: Yes


Medical Necessity: Failure to Improve With Outpatient Therapy

## 2017-11-30 ENCOUNTER — HOSPITAL ENCOUNTER (EMERGENCY)
Dept: HOSPITAL 62 - ER | Age: 82
Discharge: HOME | End: 2017-11-30
Payer: MEDICARE

## 2017-11-30 VITALS — DIASTOLIC BLOOD PRESSURE: 92 MMHG | SYSTOLIC BLOOD PRESSURE: 153 MMHG

## 2017-11-30 DIAGNOSIS — Z79.899: ICD-10-CM

## 2017-11-30 DIAGNOSIS — M25.512: ICD-10-CM

## 2017-11-30 DIAGNOSIS — Z87.891: ICD-10-CM

## 2017-11-30 DIAGNOSIS — G89.29: ICD-10-CM

## 2017-11-30 DIAGNOSIS — Z79.01: ICD-10-CM

## 2017-11-30 DIAGNOSIS — R07.9: Primary | ICD-10-CM

## 2017-11-30 LAB
ALBUMIN SERPL-MCNC: 3.3 G/DL (ref 3.5–5)
ALP SERPL-CCNC: 81 U/L (ref 38–126)
ALT SERPL-CCNC: 59 U/L (ref 21–72)
ANION GAP SERPL CALC-SCNC: 12 MMOL/L (ref 5–19)
AST SERPL-CCNC: 37 U/L (ref 17–59)
BASOPHILS # BLD AUTO: 0 10^3/UL (ref 0–0.2)
BASOPHILS NFR BLD AUTO: 0.5 % (ref 0–2)
BILIRUB DIRECT SERPL-MCNC: 0.3 MG/DL (ref 0–0.4)
BILIRUB SERPL-MCNC: 0.4 MG/DL (ref 0.2–1.3)
BUN SERPL-MCNC: 21 MG/DL (ref 7–20)
CALCIUM: 8.7 MG/DL (ref 8.4–10.2)
CHLORIDE SERPL-SCNC: 106 MMOL/L (ref 98–107)
CK MB SERPL-MCNC: < 0.22 NG/ML (ref ?–4.55)
CK SERPL-CCNC: 24 U/L (ref 55–170)
CO2 SERPL-SCNC: 22 MMOL/L (ref 22–30)
CREAT SERPL-MCNC: 1.61 MG/DL (ref 0.52–1.25)
EOSINOPHIL # BLD AUTO: 0.2 10^3/UL (ref 0–0.6)
EOSINOPHIL NFR BLD AUTO: 2 % (ref 0–6)
ERYTHROCYTE [DISTWIDTH] IN BLOOD BY AUTOMATED COUNT: 17.3 % (ref 11.5–14)
GLUCOSE SERPL-MCNC: 100 MG/DL (ref 75–110)
HCT VFR BLD CALC: 25 % (ref 37.9–51)
HGB BLD-MCNC: 8.7 G/DL (ref 13.5–17)
HGB HCT DIFFERENCE: 1.1
LYMPHOCYTES # BLD AUTO: 1.1 10^3/UL (ref 0.5–4.7)
LYMPHOCYTES NFR BLD AUTO: 14.9 % (ref 13–45)
MCH RBC QN AUTO: 28.7 PG (ref 27–33.4)
MCHC RBC AUTO-ENTMCNC: 34.6 G/DL (ref 32–36)
MCV RBC AUTO: 83 FL (ref 80–97)
MONOCYTES # BLD AUTO: 0.8 10^3/UL (ref 0.1–1.4)
MONOCYTES NFR BLD AUTO: 10.1 % (ref 3–13)
NEUTROPHILS # BLD AUTO: 5.5 10^3/UL (ref 1.7–8.2)
NEUTS SEG NFR BLD AUTO: 72.5 % (ref 42–78)
POTASSIUM SERPL-SCNC: 3.6 MMOL/L (ref 3.6–5)
PROT SERPL-MCNC: 6.8 G/DL (ref 6.3–8.2)
RBC # BLD AUTO: 3.01 10^6/UL (ref 4.35–5.55)
SODIUM SERPL-SCNC: 140 MMOL/L (ref 137–145)
TROPONIN I SERPL-MCNC: < 0.012 NG/ML
WBC # BLD AUTO: 7.6 10^3/UL (ref 4–10.5)

## 2017-11-30 PROCEDURE — 84484 ASSAY OF TROPONIN QUANT: CPT

## 2017-11-30 PROCEDURE — 93010 ELECTROCARDIOGRAM REPORT: CPT

## 2017-11-30 PROCEDURE — 85025 COMPLETE CBC W/AUTO DIFF WBC: CPT

## 2017-11-30 PROCEDURE — 80053 COMPREHEN METABOLIC PANEL: CPT

## 2017-11-30 PROCEDURE — 82553 CREATINE MB FRACTION: CPT

## 2017-11-30 PROCEDURE — 71010: CPT

## 2017-11-30 PROCEDURE — 99285 EMERGENCY DEPT VISIT HI MDM: CPT

## 2017-11-30 PROCEDURE — 93005 ELECTROCARDIOGRAM TRACING: CPT

## 2017-11-30 PROCEDURE — 82550 ASSAY OF CK (CPK): CPT

## 2017-11-30 PROCEDURE — 36415 COLL VENOUS BLD VENIPUNCTURE: CPT

## 2017-11-30 NOTE — EKG REPORT
SEVERITY:- BORDERLINE ECG -

SINUS TACHYCARDIA

PROBABLE LEFT ATRIAL ABNORMALITY

BORDERLINE PROLONGED QT INTERVAL

NONSPECIFIC LATERAL  ST-T CHANGES

:

Confirmed by: Collins Morales MD 30-Nov-2017 13:36:55

## 2017-11-30 NOTE — ER DOCUMENT REPORT
ED Cardiac





- General


Chief Complaint: Chest Pain


Stated Complaint: CHEST PAIN


Time Seen by Provider: 11/30/17 11:25


Information source: Patient, Transfer Record, Outside Facility Records


Notes: 





Patient is an 82-year-old male with a past medical history as recorded 

including a recent admission.  Patient was seen here multiple times over the 

last 2 months for chest pain.  During his last visit of chest pain complaints 

he was found also to have some tachycardia.  He was found to have a 

polymicrobial UTI complicated by chronic kidney disease and anemia of chronic 

disease.  They did transfuse the patient, treat the patient with antibiotics, 

and increase the patient's metoprolol.  The patient does have some troponin 

anemia that they thought was kidney related.  Patient is a DNR/DNI.  During 

that time they did perform a CTA of the chest as well as an x-ray of the left 

shoulder given his chronic left shoulder complaints.  Patient is on Xarelto as 

a blood thinner.





Patient presents today stating he had some chest "burning" radiating to his 

left shoulder.  This is very similar to the multiple complaints of chest 

discomfort that he has had here recently.  He denies any shortness of breath, 

nausea, vomiting, diaphoresis, calf pain or leg swelling.  He denies any chest 

discomfort at this time.  He denies any aggravating or relieving factors.


TRAVEL OUTSIDE OF THE [place] IN LAST 30 DAYS: No





- HPI


Patient complains to provider of: Chest tightness


Was the onset of pain: Gradual


Is the pain a: Chronic problem


Chest pain location: Substernal


Quality of pain: Other - See above


Chest pain radiation location: Left jaw


Severity now: Mild


Severity at worst: Mild


Pain level currently: 1


Cardiac risk factors: None


Positive cardiac history: No


Associated symptoms: Other - See above


Exacerbated by: Denies


Relieved by: Nothing


Similar symptoms previously: No


Recently seen / treated by doctor: No





- Related Data


Allergies/Adverse Reactions: 


 





No Known Allergies Allergy (Verified 11/30/17 12:57)


 








Home Medications: 


 Current Home Medications





Gabapentin [Neurontin 100 mg Capsule] 100 mg PO Q8H 11/30/17 [History]


Ketotifen Fumarate [Alaway] 1 drop OU Q12H 11/30/17 [History]











Past Medical History





- General


Information source: Patient





- Social History


Smoking Status: Former Smoker


Cigarette use (# per day): No


Chew tobacco use (# tins/day): No


Smoking Education Provided: No


Frequency of alcohol use: None


Drug Abuse: None


Family History: Reviewed & Not Pertinent


Patient has suicidal ideation: No


Patient has homicidal ideation: No





- Past Medical History


Cardiac Medical History: Reports: Hx Coronary Artery Disease, Hx DVT, Hx 

Hypertension, Hx Pulmonary Embolism


   Denies: Hx Heart Attack


Pulmonary Medical History: Reports: Hx Pneumonia


   Denies: Hx Asthma, Hx Bronchitis, Hx COPD, Hx Tuberculosis


Neurological Medical History: Denies: Hx Cerebrovascular Accident, Hx Seizures


Endocrine Medical History: Denies: Hx Diabetes Mellitus Type 1, Hx Diabetes 

Mellitus Type 2, Hx Graves' Disease, Hx Hyperthyroidism, Hx Hypothyroidism


Renal/ Medical History: Denies: Hx Peritoneal Dialysis


GI Medical History: Denies: Hx Hepatitis


Musculoskeltal Medical History: Reports Hx Arthritis, Denies Hx Fibromyalgia, 

Denies Hx Multiple Sclerosis, Denies Hx Muscular Dystrophy, Denies Hx Muscle 

Spasm, Denies Hx Muscle Weakness, Denies Hx Musculoskeletal Deformity, Denies 

Hx Musculoskeletal Trauma


Skin Medical History: Denies Hx Cellulitis, Denies Hx Eczema, Denies Hx MRSA, 

Denies Hx Psoriasis


Psychiatric Medical History: Reports: Hx Anxiety


   Denies: Hx Attention Deficit Hyperactivity Disorder, Hx Bipolar Disorder, Hx 

Borderline Personality Disorder, Hx Depression, Hx Obsessive Compulsive Disorder

, Hx Personality Disorder, Hx Schizoaffective Disorder, Hx Schizophrenia


Traumatic Medical History: Denies: Hx Fractures, Hx Gunshot Wound, Hx Liver 

Laceration, Hx Pneumothorax, Hx Spine Fracture, Hx Spleen Laceration/Rupture, 

Hx Traumatic Brain Injury


Infectious Medical History: Denies: Hx C-Diff, Hx Hepatitis, Hx HIV, Hx MRSA, 

Hx VRE


Past Surgical History: Reports: Hx Appendectomy, Hx Orthopedic Surgery - foot; 

left hip arthroplasty.  Denies: Hx Pacemaker





- Immunizations


Immunizations up to date: Yes


Hx Diphtheria, Pertussis, Tetanus Vaccination: Yes





Review of Systems





- Review of Systems


Constitutional: denies: Fever


EENT: denies: Eye discharge, Nose congestion, Nose discharge


Cardiovascular: denies: Dizziness, Lightheaded


Respiratory: denies: Hurts to breathe, Short of breath


Gastrointestinal: denies: Vomiting


Genitourinary: denies: Dysuria


Musculoskeletal: denies: Leg swelling


Skin: Other - no hives.  denies: Rash


Neurological/Psychological: Other - no slurred speech


-: Yes All other systems reviewed and negative





Physical Exam





- Vital signs


Vitals: 


 











Temp Resp BP Pulse Ox


 


 97.3 F   18   147/87 H  99 


 


 11/30/17 10:18  11/30/17 10:18  11/30/17 10:18  11/30/17 10:18











Notes: 





Reviewed vital signs and nursing note as charted by RN.





CONSTITUTIONAL: Alert and oriented and responds appropriately to questions. Well

-appearing; well-nourished





HEAD: Normocephalic; atraumatic





EYES: PERRL





ENT: Normal nose; no rhinorrhea; moist mucous membranes; pharynx without 

lesions noted





NECK: Supple without meningismus; non-tender





CARD: Regular rate and rhythm; no murmurs, no clicks, no rubs, no gallops; 

symmetric distal pulses





RESP: Normal chest excursion without splinting or tachypnea; breath sounds 

clear and equal bilaterally; no wheezes, no rhonchi, no rales





ABD/GI: Normal bowel sounds; non-distended; soft, non-tender





BACK: The back appears normal and is non-tender to palpation





EXT: Normal ROM in all joints; non-tender to palpation; no edema





SKIN: No acute lesions noted





NEURO: Moves all extremities equally; Motor and sensory function intact





PSYCH: The patient's mood and manner are appropriate. Grooming and personal 

hygiene are appropriate.





Course





- Re-evaluation


Re-evalutation: 








Given the history, physical, DNR status, already on Xarelto, with multiple 

previous presentations of similar complaints, followed by the nursing home 

facility, we will obtain a cardiac evaluation including an x-ray of the chest 

and an EKG.  I would like to check the patient's hemoglobin level, troponin, x-

ray of the chest for pneumonia, and reassess.  Given the lack of any shortness 

of breath or calf pain I do believe pulmonary embolism to be unlikely.  Given 

that the patient has been having intermittent chest pain for months with no 

radiation to his back, currently chest pain-free, I do believe that aortic 

dissection to be unlikely.





I called and spoke to the nurse taking care of the patient who states that she 

believes that this is anxiety related.  The patient has supposedly been having 

some disagreements with his roommate and has been asking multiple times over 

this last week to change rooms.  The patient was recently discontinued from 

Ativan secondary to the patient being a little somnolent.





11/30/17 11:37


    Heart rate 106, sinus tachycardia, normal axis, no obvious ST elevation or 

depression.  Flattening T waves in leads aVL, and III.  Old EKG from November 4 

shows no obvious appreciable change.





11/30/17 12:04


Initial troponin is unremarkable.





11/30/17 13:14


X-rays recorded.  Unchanged from previous x-ray here at this facility.  Patient 

is sleeping.  Second troponin is pending for 2 PM draw.





11/30/17 15:32


Second troponin is unremarkable.  Patient still denies any pain.  Patient will 

be discharged home at this time.





- Vital Signs


Vital signs: 


 











Temp Pulse Resp BP Pulse Ox


 


 97.3 F      10 L  136/75 H  100 


 


 11/30/17 10:18     11/30/17 13:01  11/30/17 13:00  11/30/17 13:01














- Laboratory


Result Diagrams: 


 11/30/17 10:48





 11/30/17 10:48


Laboratory results interpreted by me: 


 











  11/30/17 11/30/17





  10:48 10:48


 


RBC  3.01 L 


 


Hgb  8.7 L 


 


Hct  25.0 L 


 


RDW  17.3 H 


 


Plt Count  658 H 


 


BUN   21 H


 


Creatinine   1.61 H


 


Est GFR ( Amer)   50 L


 


Est GFR (Non-Af Amer)   41 L


 


Creatine Kinase   24 L


 


Albumin   3.3 L














Discharge





- Discharge


Clinical Impression: 


 Chest discomfort





Condition: Good


Disposition: HOME, SELF-CARE


Additional Instructions: 


Come back immediately with any return of pain, fever, vomiting, shortness of 

breath, leg swelling, or any other acute problems.  Please make sure that the 

patient follows up with the physician, nurse practitioner, or physician 

assistant at the facility in the next 24 hours for reassessment.


Referrals: 


MARIAN PORRAS PA-C [Primary Care Provider] - Follow up as needed

## 2017-11-30 NOTE — RADIOLOGY REPORT (SQ)
EXAM DESCRIPTION:  CHEST SINGLE VIEW



COMPLETED DATE/TIME:  11/30/2017 11:03 am



REASON FOR STUDY:  cp



COMPARISON:  11/3/2017



EXAM PARAMETERS:  NUMBER OF VIEWS: One view.

TECHNIQUE: Single frontal radiographic view of the chest acquired.

RADIATION DOSE: NA

LIMITATIONS: None.



FINDINGS:  LUNGS AND PLEURA: There appears to be a minimal left pleural effusion.  There is retrocard
iac opacification.  The medial aspect of the left hemidiaphragm is obscured.

MEDIASTINUM AND HILAR STRUCTURES: No masses.  Contour normal.

HEART AND VASCULAR STRUCTURES: Heart normal in size.  Normal vasculature.

BONES: No acute findings.

HARDWARE: None in the chest.

OTHER: No other significant finding.



IMPRESSION:  Minimal left pleural effusion.  Cannot exclude atelectasis or consolidation in the left 
lower lobe.



TECHNICAL DOCUMENTATION:  JOB ID:  [id number]

 2011 [employer]- All Rights Reserved

## 2020-03-04 ENCOUNTER — HOSPITAL ENCOUNTER (OUTPATIENT)
Dept: HOSPITAL 62 - SC | Age: 85
Discharge: HOME | End: 2020-03-04
Attending: OPHTHALMOLOGY
Payer: MEDICARE

## 2020-03-04 DIAGNOSIS — Z79.899: ICD-10-CM

## 2020-03-04 DIAGNOSIS — Z79.82: ICD-10-CM

## 2020-03-04 DIAGNOSIS — Z87.891: ICD-10-CM

## 2020-03-04 DIAGNOSIS — H40.1122: ICD-10-CM

## 2020-03-04 DIAGNOSIS — E03.9: ICD-10-CM

## 2020-03-04 DIAGNOSIS — I10: ICD-10-CM

## 2020-03-04 DIAGNOSIS — H25.12: Primary | ICD-10-CM

## 2020-03-04 PROCEDURE — 0191T: CPT

## 2020-03-04 PROCEDURE — C1783 OCULAR IMP, AQUEOUS DRAIN DE: HCPCS

## 2020-03-04 PROCEDURE — V2632 POST CHMBR INTRAOCULAR LENS: HCPCS

## 2020-03-04 PROCEDURE — 66984 XCAPSL CTRC RMVL W/O ECP: CPT

## 2020-03-04 RX ADMIN — CYCLOPENTOLATE HYDROCHLORIDE AND PHENYLEPHRINE HYDROCHLORIDE PRN DROP: 2; 10 SOLUTION/ DROPS OPHTHALMIC at 10:13

## 2020-03-04 RX ADMIN — TROPICAMIDE PRN DROP: 10 SOLUTION/ DROPS OPHTHALMIC at 10:36

## 2020-03-04 RX ADMIN — CYCLOPENTOLATE HYDROCHLORIDE AND PHENYLEPHRINE HYDROCHLORIDE PRN DROP: 2; 10 SOLUTION/ DROPS OPHTHALMIC at 10:24

## 2020-03-04 RX ADMIN — TETRACAINE HYDROCHLORIDE PRN DROP: 5 SOLUTION OPHTHALMIC at 10:36

## 2020-03-04 RX ADMIN — TROPICAMIDE PRN DROP: 10 SOLUTION/ DROPS OPHTHALMIC at 10:24

## 2020-03-04 RX ADMIN — CYCLOPENTOLATE HYDROCHLORIDE AND PHENYLEPHRINE HYDROCHLORIDE PRN DROP: 2; 10 SOLUTION/ DROPS OPHTHALMIC at 10:36

## 2020-03-04 RX ADMIN — BESIFLOXACIN PRN DROP: 6 SUSPENSION OPHTHALMIC at 10:25

## 2020-03-04 RX ADMIN — TETRACAINE HYDROCHLORIDE PRN DROP: 5 SOLUTION OPHTHALMIC at 10:13

## 2020-03-04 RX ADMIN — TROPICAMIDE PRN DROP: 10 SOLUTION/ DROPS OPHTHALMIC at 10:13

## 2020-03-04 RX ADMIN — BESIFLOXACIN PRN DROP: 6 SUSPENSION OPHTHALMIC at 10:13

## 2020-03-04 RX ADMIN — BESIFLOXACIN PRN DROP: 6 SUSPENSION OPHTHALMIC at 11:13

## 2020-03-04 RX ADMIN — TETRACAINE HYDROCHLORIDE PRN DROP: 5 SOLUTION OPHTHALMIC at 10:41

## 2020-03-04 NOTE — OPERATIVE REPORT
Operative Report-[facility]


Operative Report: 





DATE OF SURGERY: March 4, 2020


PREOPERATIVE DIAGNOSIS: NUCLEAR CATARACT, LEFT EYE.


POSTOPERATIVE DIAGNOSIS: NUCLEAR CATARACT, LEFT EYE.


PROCEDURE PERFORMED: PHACOEMULSIFICATION WITH POSTERIOR CHAMBER INTRAOCULAR LENS

IMPLANT, LEFT EYE.  I stent


SURGEON: Mike De La Cruz DO


MEDICATIONS AND ANESTHESIA:


Versed: IV Versed Tetracaine drops: 1 to 2 drops given as needed


COMPLICATION: [None]


INDICATIONS FOR SURGERY: Medical necessity: Best corrected visual acuity worse 

than 20/40 secondary to cataracts with impairment of ability to carry out needs 

or desired activities, blurred vision, visual distortion, reduced contrast 

sensitivity and/or glare with association functional impairment and supporting 

documentation/testing, and cataracts causing symptomatic impairment of visual 

functions not corrected with tolerable changes in glasses or contact lenses 

interfering with activities of daily life.


PROCEDURE:


Consent: The risks, benefits and alternatives of this procedures was discussed 

with the patient. The patient read and signed the consent forms, was identified 

and was seated in the exam chair.


IOL: [MX 60 E 19.5]


IOL Diopters: []


Phacoemulsification with posterior chamber intraocular lens implant: The face 

was prepped with 5% povidone iodine solution, and a few drops of 5% povidone 

iodine solution was instilled into the inferior fornix. A non-fenestrated drape 

was placed over the eye and the lids were parted with the speculum. A 

paracentesis was made with a 15 degree blade, and 1% lidocaine MPF followed by 

viscoelastic was injected into the anterior chamber. A 2.4 mm metal micro-

keratome was used to create a temporal clear corneal incision. A circular 

anterior capsulorrhexis was created, followed by hydro-dissection and hydro-

delineation. The phacoemulsification hand piece was inserted and the nucleus was

 removed with the Phaco chop technique. The irrigation-aspiration hand piece was

 used to remove the residual cortex, and vacuum the posterior capsule. The 

capsular bag was inflated and viscoelastic and the above-mentioned IOL was 

injected into the eye with care to insert both leaning and trailing haptics in 

the capsular bag. The irrigation/aspiration hand piece was reinserted to remove 

residual viscoelastic from the capsular bag and anterior chamber. The corneal 

incision was hydrated, and anterior chamber was inflated with sterile BSS via 

the paracentesis site, and found to be watertight.  In addition and I stent 

inject was placed nasally two clock hours apart


Postop medication:1 drop of prednisolone into operative by followed by 1 drop of

 Cosopt into operative eye followed by 1 drop of Besivance intraoperative by  

Other: []

## 2020-03-18 ENCOUNTER — HOSPITAL ENCOUNTER (OUTPATIENT)
Dept: HOSPITAL 62 - SC | Age: 85
Discharge: HOME | End: 2020-03-18
Attending: OPHTHALMOLOGY
Payer: MEDICARE

## 2020-03-18 DIAGNOSIS — H40.1132: ICD-10-CM

## 2020-03-18 DIAGNOSIS — Z98.42: ICD-10-CM

## 2020-03-18 DIAGNOSIS — H25.11: Primary | ICD-10-CM

## 2020-03-18 DIAGNOSIS — E03.9: ICD-10-CM

## 2020-03-18 DIAGNOSIS — I10: ICD-10-CM

## 2020-03-18 DIAGNOSIS — Z79.82: ICD-10-CM

## 2020-03-18 DIAGNOSIS — Z87.891: ICD-10-CM

## 2020-03-18 DIAGNOSIS — Z79.899: ICD-10-CM

## 2020-03-18 PROCEDURE — C1783 OCULAR IMP, AQUEOUS DRAIN DE: HCPCS

## 2020-03-18 PROCEDURE — 66984 XCAPSL CTRC RMVL W/O ECP: CPT

## 2020-03-18 PROCEDURE — V2632 POST CHMBR INTRAOCULAR LENS: HCPCS

## 2020-03-18 PROCEDURE — 0191T: CPT

## 2020-03-18 RX ADMIN — DORZOLAMIDE HYDROCHLORIDE AND TIMOLOL MALEATE PRN DROP: 20; 5 SOLUTION OPHTHALMIC at 00:00

## 2020-03-18 RX ADMIN — TETRACAINE HYDROCHLORIDE PRN DROP: 5 SOLUTION OPHTHALMIC at 09:52

## 2020-03-18 RX ADMIN — BESIFLOXACIN PRN DROP: 6 SUSPENSION OPHTHALMIC at 10:04

## 2020-03-18 RX ADMIN — DORZOLAMIDE HYDROCHLORIDE AND TIMOLOL MALEATE PRN DROP: 20; 5 SOLUTION OPHTHALMIC at 10:46

## 2020-03-18 RX ADMIN — BESIFLOXACIN PRN DROP: 6 SUSPENSION OPHTHALMIC at 10:46

## 2020-03-18 RX ADMIN — CYCLOPENTOLATE HYDROCHLORIDE AND PHENYLEPHRINE HYDROCHLORIDE PRN DROP: 2; 10 SOLUTION/ DROPS OPHTHALMIC at 09:52

## 2020-03-18 RX ADMIN — TROPICAMIDE PRN DROP: 10 SOLUTION/ DROPS OPHTHALMIC at 10:02

## 2020-03-18 RX ADMIN — BESIFLOXACIN PRN DROP: 6 SUSPENSION OPHTHALMIC at 00:00

## 2020-03-18 RX ADMIN — TETRACAINE HYDROCHLORIDE PRN DROP: 5 SOLUTION OPHTHALMIC at 10:21

## 2020-03-18 RX ADMIN — BESIFLOXACIN PRN DROP: 6 SUSPENSION OPHTHALMIC at 09:52

## 2020-03-18 RX ADMIN — CYCLOPENTOLATE HYDROCHLORIDE AND PHENYLEPHRINE HYDROCHLORIDE PRN DROP: 2; 10 SOLUTION/ DROPS OPHTHALMIC at 10:21

## 2020-03-18 RX ADMIN — TROPICAMIDE PRN DROP: 10 SOLUTION/ DROPS OPHTHALMIC at 09:52

## 2020-03-18 RX ADMIN — TROPICAMIDE PRN DROP: 10 SOLUTION/ DROPS OPHTHALMIC at 10:21

## 2020-03-18 RX ADMIN — CYCLOPENTOLATE HYDROCHLORIDE AND PHENYLEPHRINE HYDROCHLORIDE PRN DROP: 2; 10 SOLUTION/ DROPS OPHTHALMIC at 10:02

## 2020-03-18 RX ADMIN — TETRACAINE HYDROCHLORIDE PRN DROP: 5 SOLUTION OPHTHALMIC at 10:27

## 2020-03-18 NOTE — OPERATIVE REPORT
Operative Report-[facility]


Operative Report: 





DATE OF SURGERY: March 18, 2020


PREOPERATIVE DIAGNOSIS: NUCLEAR CATARACT, RIGHT EYE.  Glaucoma


POSTOPERATIVE DIAGNOSIS: NUCLEAR CATARACT, RIGHT EYE.  Glaucoma


PROCEDURE PERFORMED: PHACOEMULSIFICATION WITH POSTERIOR CHAMBER INTRAOCULAR LENS

IMPLANT, RIGHT EYE.  I stent


SURGEON: Mike De La Cruz, DO


MEDICATIONS AND ANESTHESIA:


Versed: IV Versed Tetracaine drops: 1 to 2 drops given as needed


COMPLICATION: None


INDICATIONS FOR SURGERY: Medical necessity: Best corrected visual acuity worse 

than 20/40 secondary to cataracts with impairment of ability to carry out needs 

or desired activities, blurred vision, visual distortion, reduced contrast 

sensitivity and/or glare with association functional impairment and supporting 

documentation/testing, and cataracts causing symptomatic impairment of visual 

functions not corrected with tolerable changes in glasses or contact lenses 

interfering with activities of daily life.


PROCEDURE:


Consent: The risks, benefits and alternatives of this procedures was discussed 

with the patient. The patient read and signed the consent forms, was identified 

and was seated in the exam chair.


IOL: MX 60 E 20.5


IOL Diopters:


Phacoemulsification with posterior chamber intraocular lens implant: The face 

was prepped with 5% povidone iodine solution, and a few drops of 5% povidone 

iodine solution was instilled into the inferior fornix. A non-fenestrated drape 

was placed over the eye and the lids were parted with the speculum. A 

paracentesis was made with a 15 degree blade, and 1% lidocaine MPF followed by 

viscoelastic was injected into the anterior chamber. A 2.4 mm metal micro-

keratome was used to create a temporal clear corneal incision. A circular 

anterior capsulorrhexis was created, followed by hydro-dissection and hydro-

delineation. The phacoemulsification hand piece was inserted and the nucleus was

removed with the Phaco chop technique. The irrigation-aspiration hand piece was 

used to remove the residual cortex, and vacuum the posterior capsule. The 

capsular bag was inflated and viscoelastic and the above-mentioned IOL was 

injected into the eye with care to insert both leaning and trailing haptics in 

the capsular bag. The irrigation/aspiration hand piece was reinserted to remove 

residual viscoelastic from the capsular bag and anterior chamber. The corneal 

incision was hydrated, and anterior chamber was inflated with sterile BSS via 

the paracentesis site, and found to be watertight.  In addition two istents were

placed approximately 2 clock hours apart


Postop medication: 1 drop of prednisolone into operative by followed by 1 drop 

of Cosopt into operative eye followed by 1 drop of Besivance intraoperative by 

other:

## 2020-03-26 NOTE — PDOC PROGRESS REPORT
Left message asking pt to return my call at     Reason: [facility] appts postponed to May~ Need to r/s her [facility] appt    Subjective


Progress Note for:: 11/06/17


Subjective:: 





Patient continues complaining of pain in both shoulders.





Physical Exam


Vital Signs: 


 











Temp Pulse Resp BP Pulse Ox


 


 98.5 F   920 H  18   113/66   99 


 


 11/06/17 00:00  11/06/17 02:00  11/06/17 00:00  11/06/17 00:00  11/06/17 00:00








 Intake & Output











 11/05/17 11/06/17 11/07/17





 06:59 06:59 06:59


 


Intake Total  619 


 


Output Total  1 


 


Balance  618 


 


Weight  65.7 kg 











General appearance: PRESENT: cooperative, mild distress, thin


Head exam: PRESENT: atraumatic, normocephalic


Eye exam: PRESENT: EOMI, PERRLA


Mouth exam: PRESENT: moist, neck supple


Teeth exam: PRESENT: edentulous


Neck exam: PRESENT: tenderness.  ABSENT: full ROM, JVD


Respiratory exam: PRESENT: clear to auscultation alexandra, tachypnea, unlabored


Cardiovascular exam: PRESENT: RRR.  ABSENT: diastolic murmur, gallop, systolic 

murmur


GI/Abdominal exam: PRESENT: normal bowel sounds, soft, tenderness.  ABSENT: 

guarding


Extremities exam: ABSENT: joint swelling, pedal edema


Neurological exam: PRESENT: alert, oriented to person, oriented to place


Psychiatric exam: PRESENT: anxious





Results


Laboratory Results: 


 





 11/06/17 07:09 





 











  11/05/17 11/06/17





  13:58 07:09


 


WBC   5.7


 


RBC   3.35 L


 


Hgb   9.6 L


 


Hct   27.7 L


 


MCV   83


 


MCH   28.8


 


MCHC   34.8


 


RDW   16.4 H


 


Plt Count   504 H


 


Seg Neutrophils %   65.4


 


Lymphocytes %   19.9


 


Monocytes %   12.5


 


Eosinophils %   1.8


 


Basophils %   0.4


 


Absolute Neutrophils   3.7


 


Absolute Lymphocytes   1.1


 


Absolute Monocytes   0.7


 


Absolute Eosinophils   0.1


 


Absolute Basophils   0.0


 


TSH  4.30 











Impressions: 


 





Chest X-Ray  11/03/17 08:59


IMPRESSION:  Minimal bibasilar atelectasis.


 








Shoulder X-Ray  11/03/17 08:59


IMPRESSION:  Osteoporotic.  No acute fracture or malalignment.


 








Chest/Abdomen CTA  11/03/17 12:02


IMPRESSION:  Obstructive lung disease.


Bibasilar atelectasis


No CT angio evidence of acute pulmonary emboli.


 














Assessment & Plan





- Diagnosis


(1) Chest pain


Qualifiers: 


   Chest pain type: precordial pain   Qualified Code(s): R07.2 - Precordial 

pain   


Is this a current diagnosis for this admission?: Yes   


Plan: 


Consult palliative care. Increase fentanyl patch dose.








(2) Left shoulder pain


Qualifiers: 


   Chronicity: chronic   Qualified Code(s): M25.512 - Pain in left shoulder; 

G89.29 - Other chronic pain; G89.29 - Other chronic pain   


Is this a current diagnosis for this admission?: Yes   


Plan: 


Appears to be arthritic. Pain management.








(3) Tachycardia


Is this a current diagnosis for this admission?: Yes   


Plan: 


Improved and due to septicemia.








(4) UTI (urinary tract infection)


Qualifiers: 


   Urinary tract infection type: acute cystitis   Hematuria presence: with 

hematuria   Qualified Code(s): N30.01 - Acute cystitis with hematuria   


Is this a current diagnosis for this admission?: Yes   


Plan: 


Continue rocephin iv. Due to E coli. 








(5) Pulmonary emboli


Qualifiers: 


   Pulmonary embolism type: other 


Is this a current diagnosis for this admission?: No   


Plan: 


Continue xarelto as outpatient








(6) NSTEMI (non-ST elevated myocardial infarction)


Is this a current diagnosis for this admission?: Yes   


Plan: 


May be due to myocardial demand vs true coranry artery disease. Continue 

aspirin. Discontinue low dose imdur due to tachycardia. ECHO ordered and 

pending result.








(7) Chronic pain


Is this a current diagnosis for this admission?: Yes   


Plan: 


Increase fentanyl dose.








(8) Weakness


Is this a current diagnosis for this admission?: Yes   


Plan: 


Continue PT. He is very stiff








(9) Septicemia


Is this a current diagnosis for this admission?: Yes   


Plan: 


Blood culture growing staph epidermidis. Awaiting sensitivity result. 

Tachycardia improved.








- Time


Time Spent with patient: 15-24 minutes


Medications reviewed and adjusted accordingly: Yes


Anticipated discharge: SNF





- Inpatient Certification


Based on my medical assessment, after consideration of the patient's 

comorbidities, presenting symptoms, or acuity I expect that the services needed 

warrant INPATIENT care.: Yes


I certify that my determination is in accordance with my understanding of 

Medicare's requirements for reasonable and necessary INPATIENT services [42 CFR 

412.3e].: Yes


Medical Necessity: Need for Pain Control, Need for IV Antibiotics

## 2020-06-05 NOTE — ER DOCUMENT REPORT
ED General





- General


Stated Complaint: CHEST WALL PAIN


Time Seen by Provider: 10/02/17 22:12


Notes: 


Patient is an 82-year-old male who presents with complaint of pain to the left 

side of his chest that radiates down from the neck.  He had neck surgery 

recently.  Says pain has been intermittent since then but was more continuous 

today.  Paramedics said that they try to give him his pain medicine at the 

nursing home but he refused that and said to call 911.  He is therefore brought 

here.  He denies any new weakness or numbness.  He denies any history of heart 

disease.  He denies any difficulty breathing.  No fevers.  He is currently on 

doxycycline and Levaquin for pneumonia and UTI.  He has no other complaints at 

this time.





TRAVEL OUTSIDE OF THE [place] IN LAST 30 DAYS: No





- Related Data


Allergies/Adverse Reactions: 


 





No Known Allergies Allergy (Verified 10/02/17 23:30)


 











Past Medical History





- Social History


Smoking Status: Unknown if Ever Smoked


Frequency of alcohol use: None


Drug Abuse: None


Family History: Reviewed & Not Pertinent





- Past Medical History


Cardiac Medical History: Reports: Hx Hypertension, Hx Pulmonary Embolism


   Denies: Hx Coronary Artery Disease, Hx Heart Attack


Pulmonary Medical History: 


   Denies: Hx Asthma, Hx Bronchitis, Hx COPD, Hx Pneumonia, Hx Tuberculosis


Neurological Medical History: Denies: Hx Cerebrovascular Accident, Hx Seizures


Endocrine Medical History: Denies: Hx Diabetes Mellitus Type 1, Hx Diabetes 

Mellitus Type 2, Hx Graves' Disease, Hx Hyperthyroidism, Hx Hypothyroidism


Renal/ Medical History: Denies: Hx Peritoneal Dialysis


GI Medical History: Denies: Hx Hepatitis


Musculoskeltal Medical History: Reports Hx Arthritis, Denies Hx Fibromyalgia, 

Denies Hx Multiple Sclerosis, Denies Hx Muscular Dystrophy, Denies Hx Muscle 

Spasm, Denies Hx Muscle Weakness, Denies Hx Musculoskeletal Deformity, Denies 

Hx Musculoskeletal Trauma


Skin Medical History: Denies Hx Cellulitis, Denies Hx Eczema, Denies Hx MRSA, 

Denies Hx Psoriasis


Psychiatric Medical History: Reports: Hx Anxiety


   Denies: Hx Attention Deficit Hyperactivity Disorder, Hx Bipolar Disorder, Hx 

Borderline Personality Disorder, Hx Depression, Hx Obsessive Compulsive Disorder

, Hx Personality Disorder, Hx Schizoaffective Disorder, Hx Schizophrenia


Traumatic Medical History: Denies: Hx Fractures, Hx Gunshot Wound, Hx Liver 

Laceration, Hx Pneumothorax, Hx Spine Fracture, Hx Spleen Laceration/Rupture, 

Hx Traumatic Brain Injury


Infectious Medical History: Denies: Hx C-Diff, Hx Hepatitis, Hx HIV, Hx MRSA, 

Hx VRE


Past Surgical History: Reports: Hx Appendectomy, Hx Orthopedic Surgery - foot; 

left hip arthroplasty.  Denies: Hx Pacemaker





- Immunizations


Immunizations up to date: Yes


Hx Diphtheria, Pertussis, Tetanus Vaccination: Yes





Review of Systems





- Review of Systems


Notes: 





My Normal Review Basic





REVIEW OF SYSTEMS:


CONSTITUTIONAL :  Denies fever,  chills, or sweats.  Denies recent illness.


EENT:   Denies eye, ear, throat, or mouth pain or symptoms.  Denies nasal or 

sinus congestion.


CARDIOVASCULAR: Some left upper chest pain.


RESPIRATORY:  Denies cough, cold, or chest congestion.  Denies shortness of 

breath, difficulty breathing, or wheezing.


GASTROINTESTINAL:  Denies abdominal pain.  Denies nausea, vomiting, or 

diarrhea.  Denies constipation.  Last BM: 


MUSCULOSKELETAL: Left-sided neck pain.


SKIN:   Denies rash or skin lesions.


NEUROLOGICAL:   Denies new sensory or motor loss.


ALL OTHER SYSTEMS REVIEWED AND NEGATIVE.





Physical Exam





- Vital signs


Vitals: 


 











Resp


 


 21 H


 


 10/02/17 22:16














- Notes


Notes: 





General Appearance: Well nourished, alert, cooperative, no acute distress,

moderate obvious discomfort.


Vitals: reviewed, See vital signs table.


Head: no swelling or tenderness to the head


Eyes: PERRL, EOMI, Conjuctiva clear


Mouth: No decreasd moisture


Throat: No tonsillar inflammation, No airway obstruction,  No lymphadenopathy


Neck: Surgical incision site to the left lateral aspect of the neck is intact.  

No abnormal drainage.  No foul smell.  No significant redness.


Chest wall: Tenderness to palpation of her left upper chest wall that tracks up 

to incision site coming from his recent neck surgery.


Lungs: No wheezing, No rales, No rhonci, No accessory muscle use, good air 

exchange bilaterally.


Heart: Normal rate, Regular rythm, No murmur, no rub


Extremities:  strength in left hand is slightly weaker than the right hand.

, good pulses in all extremities, no swelling or tenderness in the extremities, 

no edema.


Skin: Patient does have a sacral decubitus ulcer.  Stage II.  No surrounding 

erythema or redness or signs of infection.


Neuro: speech clear, oriented x 3, normal affect, responds appropriately to 

questions.





Course





- Vital Signs


Vital signs: 


 











Temp Pulse Resp BP Pulse Ox


 


 97.8 F      18   139/80 H  100 


 


 10/02/17 23:43     10/03/17 04:00  10/03/17 04:00  10/03/17 04:01














- Laboratory


Result Diagrams: 


 10/02/17 22:35





 10/02/17 22:35


Laboratory results interpreted by me: 


 











  10/02/17 10/02/17





  22:35 22:35


 


RBC  3.86 L 


 


Hgb  11.1 L 


 


Hct  31.6 L 


 


RDW  14.4 H 


 


Plt Count  657 H 


 


Seg Neutrophils %  81.8 H 


 


Lymphocytes %  10.5 L 


 


Absolute Neutrophils  8.4 H 


 


Sodium   133.1 L


 


BUN   25 H


 


Direct Bilirubin   0.6 H


 


Alkaline Phosphatase   163 H


 


Creatine Kinase   224 H














- EKG Interpretation by Me


Additional EKG results interpreted by me: 





10/02/17 22:31


EKG is reviewed and interpreted by me.  EKG shows normal sinus rhythm with a 

rate of 93 bpm.  No ST segment elevation or depression.  No ischemic T-wave 

inversions.  IA interval, QRS duration are within normal range.  QT interval is 

prolonged.





- Transfer of Care


Notes: 





10/03/17 05:48


Patient's chest pain is easily reproducible to the patient and is only on the 

upper chest just below the neck near the incision site.  The pain radiates down 

from the neck.  I suspect all his pain is related to his neck surgery site.  

The patient actually agrees with this.  His troponin delta troponin are 

negative.  His EKG is negative.  I did obtain troponin and EKG because he is 

elderly and sometimes there can be atypical presentations for heart disease.  

At this time I feel the patient is safe to be discharged home.  He is feeling 

better after pain medicine was given here.  I informed him that he should take 

pain medicine is offered to him at the nursing home to help prevent him from 

having these worsening pain exacerbations.  Patient agrees with plan and will 

be discharged home.





Dictation of this chart was performed using voice recognition software; 

therefore, there may be some unintended grammatical errors.





Discharge





- Discharge


Clinical Impression: 


 Neck pain on left side, Cervical radiculopathy





Sacral ulcer


Qualifiers:


 Non-pressure ulcer stage: unspecified non-pressure ulcer stage Qualified Code(s

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





Condition: Good


Disposition: HOME, SELF-CARE


Additional Instructions: 


You workup looking at your heart showed no evidence of heart attack. Your pain 

seems to be coming from your neck. Please take your medications as prescribed. 

Please return to the ER immediately if you have worsening pain, fevers, or feel 

unwell.


Referrals: 


MARIAN PORRAS PA-C [Primary Care Provider] - Follow up in 3-5 days cleansed

## 2020-10-05 ENCOUNTER — HOSPITAL ENCOUNTER (EMERGENCY)
Dept: HOSPITAL 62 - ER | Age: 85
Discharge: HOME | End: 2020-10-05
Payer: MEDICARE

## 2020-10-05 VITALS — SYSTOLIC BLOOD PRESSURE: 163 MMHG | DIASTOLIC BLOOD PRESSURE: 98 MMHG

## 2020-10-05 DIAGNOSIS — Z79.82: ICD-10-CM

## 2020-10-05 DIAGNOSIS — I10: ICD-10-CM

## 2020-10-05 DIAGNOSIS — I25.10: ICD-10-CM

## 2020-10-05 DIAGNOSIS — R20.8: ICD-10-CM

## 2020-10-05 DIAGNOSIS — M54.6: Primary | ICD-10-CM

## 2020-10-05 DIAGNOSIS — Z79.899: ICD-10-CM

## 2020-10-05 LAB
ADD MANUAL DIFF: NO
ALBUMIN SERPL-MCNC: 4 G/DL (ref 3.5–5)
ALP SERPL-CCNC: 68 U/L (ref 38–126)
ANION GAP SERPL CALC-SCNC: 10 MMOL/L (ref 5–19)
AST SERPL-CCNC: 19 U/L (ref 17–59)
BASOPHILS # BLD AUTO: 0 10^3/UL (ref 0–0.2)
BASOPHILS NFR BLD AUTO: 0.4 % (ref 0–2)
BILIRUB DIRECT SERPL-MCNC: 0.3 MG/DL (ref 0–0.4)
BILIRUB SERPL-MCNC: 1 MG/DL (ref 0.2–1.3)
BUN SERPL-MCNC: 18 MG/DL (ref 7–20)
CALCIUM: 9.2 MG/DL (ref 8.4–10.2)
CHLORIDE SERPL-SCNC: 103 MMOL/L (ref 98–107)
CK MB SERPL-MCNC: 0.83 NG/ML (ref ?–4.55)
CK SERPL-CCNC: 75 U/L (ref 55–170)
CO2 SERPL-SCNC: 23 MMOL/L (ref 22–30)
EOSINOPHIL # BLD AUTO: 0.1 10^3/UL (ref 0–0.6)
EOSINOPHIL NFR BLD AUTO: 1.1 % (ref 0–6)
ERYTHROCYTE [DISTWIDTH] IN BLOOD BY AUTOMATED COUNT: 15.8 % (ref 11.5–14)
GLUCOSE SERPL-MCNC: 106 MG/DL (ref 75–110)
HCT VFR BLD CALC: 40.8 % (ref 37.9–51)
HGB BLD-MCNC: 13.7 G/DL (ref 13.5–17)
LYMPHOCYTES # BLD AUTO: 1 10^3/UL (ref 0.5–4.7)
LYMPHOCYTES NFR BLD AUTO: 15.4 % (ref 13–45)
MCH RBC QN AUTO: 28.2 PG (ref 27–33.4)
MCHC RBC AUTO-ENTMCNC: 33.6 G/DL (ref 32–36)
MCV RBC AUTO: 84 FL (ref 80–97)
MONOCYTES # BLD AUTO: 0.3 10^3/UL (ref 0.1–1.4)
MONOCYTES NFR BLD AUTO: 5.3 % (ref 3–13)
NEUTROPHILS # BLD AUTO: 5 10^3/UL (ref 1.7–8.2)
NEUTS SEG NFR BLD AUTO: 77.8 % (ref 42–78)
PLATELET # BLD: 225 10^3/UL (ref 150–450)
POTASSIUM SERPL-SCNC: 3.9 MMOL/L (ref 3.6–5)
PROT SERPL-MCNC: 7.7 G/DL (ref 6.3–8.2)
RBC # BLD AUTO: 4.87 10^6/UL (ref 4.35–5.55)
TOTAL CELLS COUNTED % (AUTO): 100 %
TROPONIN I SERPL-MCNC: < 0.012 NG/ML
WBC # BLD AUTO: 6.4 10^3/UL (ref 4–10.5)

## 2020-10-05 PROCEDURE — 93005 ELECTROCARDIOGRAM TRACING: CPT

## 2020-10-05 PROCEDURE — 71046 X-RAY EXAM CHEST 2 VIEWS: CPT

## 2020-10-05 PROCEDURE — 82550 ASSAY OF CK (CPK): CPT

## 2020-10-05 PROCEDURE — 80053 COMPREHEN METABOLIC PANEL: CPT

## 2020-10-05 PROCEDURE — 84484 ASSAY OF TROPONIN QUANT: CPT

## 2020-10-05 PROCEDURE — 83690 ASSAY OF LIPASE: CPT

## 2020-10-05 PROCEDURE — 36415 COLL VENOUS BLD VENIPUNCTURE: CPT

## 2020-10-05 PROCEDURE — 93010 ELECTROCARDIOGRAM REPORT: CPT

## 2020-10-05 PROCEDURE — 82553 CREATINE MB FRACTION: CPT

## 2020-10-05 PROCEDURE — 99285 EMERGENCY DEPT VISIT HI MDM: CPT

## 2020-10-05 PROCEDURE — 85025 COMPLETE CBC W/AUTO DIFF WBC: CPT

## 2020-10-05 NOTE — ER DOCUMENT REPORT
ED General





- General


Chief Complaint: Chest Pain


Stated Complaint: BURNING IN BACK


Time Seen by Provider: 10/05/20 03:39


Primary Care Provider: 


MARIAN PORRAS PA-C [Primary Care Provider] - Follow up as needed


TRAVEL OUTSIDE OF THE [place] IN LAST 30 DAYS: No





- HPI


Onset/Duration: Sudden


Quality of pain: Burning


Context: 





This is a 85-year-old male presenting with a chief complaint of a burning 

sensation in his back.  Patient localizes the burning sensation to his mid back 

in the thoracic region and states that lasted approximately 1 hour.  At that 

point the patient called EMS.  By the time EMS had arrived on scene patient stat

es his discomfort in his back was gone.  Patient denies chest pain.  Patient 

denies jaw pain, shortness of breath, diaphoresis, nausea, vomiting, abdominal 

pain.  Patient denies history of COVID-19 infection, known exposure to COVID 

positive patients, known exposure to persons under investigation for COVID.  

Patient denies recent falls or other traumatic back injury.  Patient describes 

the sensation as a burning sensation, rated at its maximum as a 3 out of 5 and 

currently rates it is a 0 out of 5.  Patient states that nothing made the pain 

worse and nothing alleviated the pain.  Patient states that the pain just "went 

away on its own."


Associated symptoms: None


Exacerbated by: Denies


Relieved by: Denies





- Related Data


Allergies/Adverse Reactions: 


                                        





No Known Allergies Allergy (Verified 20 10:45)


   








Home Medications: ASA, carvedilol, flomax, tamsulosin





Past Medical History





- General


Information source: Patient





- Social History


Smoking Status: Never Smoker


Frequency of alcohol use: None


Drug Abuse: None


Family History: Reviewed & Not Pertinent


Patient has suicidal ideation: No


Patient has homicidal ideation: No





- Medical History


Medical History: Other - Review of patient's medical record reveals that the 

patient is DNR/DNI





- Past Medical History


Cardiac Medical History: Reports: Hx Coronary Artery Disease, Hx DVT, Hx Hyperte

nsion, Hx Pulmonary Embolism


   Denies: Hx Heart Attack


Pulmonary Medical History: Reports: Hx Pneumonia


   Denies: Hx Asthma, Hx Bronchitis, Hx COPD, Hx Tuberculosis


Neurological Medical History: Denies: Hx Cerebrovascular Accident, Hx Seizures


Endocrine Medical History: Denies: Hx Diabetes Mellitus Type 1, Hx Diabetes 

Mellitus Type 2, Hx Graves' Disease, Hx Hyperthyroidism, Hx Hypothyroidism


Renal/ Medical History: Denies: Hx Peritoneal Dialysis


GI Medical History: Denies: Hx Hepatitis, Hx Hiatal Hernia, Hx Ulcer


Musculoskeletal Medical History: Reports Hx Arthritis, Denies Hx Fibromyalgia, 

Denies Hx Multiple Sclerosis, Denies Hx Muscular Dystrophy, Denies Hx Muscle 

Spasm, Denies Hx Muscle Weakness, Denies Hx Musculoskeletal Deformity, Denies Hx

Musculoskeletal Trauma


Skin Medical History: Denies Hx Cellulitis, Denies Hx Eczema, Denies Hx MRSA, 

Denies Hx Psoriasis


Psychiatric Medical History: Reports: Hx Anxiety


   Denies: Hx Attention Deficit Hyperactivity Disorder, Hx Bipolar Disorder, Hx 

Borderline Personality Disorder, Hx Depression, Hx Obsessive Compulsive 

Disorder, Hx Personality Disorder, Hx Schizoaffective Disorder, Hx Schizophrenia


Traumatic Medical History: Denies: Hx Fractures, Hx Gunshot Wound, Hx Liver 

Laceration, Hx Pneumothorax, Hx Spine Fracture, Hx Spleen Laceration/Rupture, Hx

Traumatic Brain Injury


Infectious Medical History: Denies: Hx C-Diff, Hx Hepatitis, Hx HIV, Hx MRSA, Hx

VRE


Past Surgical History: Reports: Hx Appendectomy, Hx Orthopedic Surgery - foot; 

left hip arthroplasty.  Denies: Hx Open Heart Surgery, Hx Pacemaker





- Immunizations


Immunizations up to date: Yes


Hx Diphtheria, Pertussis, Tetanus Vaccination: Yes





Review of Systems





- Review of Systems


Constitutional: No symptoms reported


EENT: No symptoms reported


Cardiovascular: No symptoms reported


Respiratory: No symptoms reported


Gastrointestinal: No symptoms reported


Genitourinary: No symptoms reported


Male Genitourinary: No symptoms reported


Musculoskeletal: Back pain


Skin: No symptoms reported


Hematologic/Lymphatic: No symptoms reported


Neurological/Psychological: No symptoms reported


-: Yes All other systems reviewed and negative





Physical Exam





- Vital signs


Vitals: 


                                        











Temp Pulse Resp BP Pulse Ox


 


 97.7 F   82   16   148/102 H  99 


 


 10/05/20 01:54  10/05/20 01:54  10/05/20 01:54  10/05/20 01:54  10/05/20 01:54














- Notes


Notes: 





CONSTITUTIONAL 


[Vital signs reviewed, Patient appears comfortable, Alert and oriented X 3, 

Normal stature.]


HEAD 


[Atraumatic, Normocephalic.]


EYES 


[Eyes are normal to inspection, No discharge from eyes, Extraocular muscles 

intact, Sclera are normal, Conjunctiva are normal.]


NECK 


[Normal ROM, No jugular venous distention, No meningeal signs, no carotid 

bruit.]


RESPIRATORY CHEST 


[Chest is nontender, Breath sounds normal, No respiratory distress.]


CARDIOVASCULAR 


[RRR, No murmurs, Normal S1 S2, No rub, No gallop.]


ABDOMEN 


[Abdomen is nontender, No pulsatile masses, No other masses, Bowel sounds 

normal, No distension, No peritoneal signs, No hernias.]


BACK 


[There is no CVA Tenderness, There is no tenderness to palpation, Normal 

inspection.]


UPPER EXTREMITY 


[Inspection normal, No cyanosis, No clubbing, No edema, 2+ radial pulses.]


LOWER EXTREMITY 


[Inspection normal, No cyanosis, No clubbing, No edema, No calf tenderness, 2+ 

femoral pulses.]


NEURO 


[No focal motor deficits, No focal sensory deficits, Speech normal.]


SKIN 


[Skin is warm, Skin is dry, Skin is normal color.]


LYMPHATIC 


[No adenopathy in neck.]


PSYCHIATRIC 


[Normal affect. ]





Course





- Re-evaluation


Re-evalutation: 





10/05/20 05:52


Patient is lying in bed in no acute distress.  Patient denies any back or chest 

pain.  Results of ED MSE discussed with patient.  Emergency signs and symptoms, 

reasons to return to the emergency department discussed with patient.


10/05/20 05:53


I do not see any evidence of any emergency medical condition at this time.  

Patient's EKG is unremarkable, patient's troponin is negative.  Patient has no 

pain or tenderness on examination of his back.  Patient denies chest pain.  

There is no evidence of a vesicular rash to suggest herpes zoster.  Signs and 

symptoms of such discussed with patient.  At this time I feel it is reasonable 

to discharge the patient home with information on signs and symptoms to watch 

for and reasons to call 911 and return to the emergency department if necessary.





- Vital Signs


Vital signs: 


                                        











Temp Pulse Resp BP Pulse Ox


 


 97.7 F   82   18   148/102 H  99 


 


 10/05/20 01:54  10/05/20 01:54  10/05/20 02:13  10/05/20 01:54  10/05/20 02:05














- Laboratory


Result Diagrams: 


                                 10/05/20 03:40





                                 10/05/20 02:15


Laboratory results interpreted by me: 


                                        











  10/05/20 10/05/20





  02:15 03:40


 


RDW   15.8 H


 


Sodium  136.2 L 


 


Creatinine  1.56 H 


 


Est GFR ( Amer)  52 L 


 


Est GFR (MDRD) Non-Af  43 L 














- Diagnostic Test


Radiology reviewed: Reports reviewed





- EKG Interpretation by Me


Additional EKG results interpreted by me: 





10/05/20 05:25


EKG obtained on 10/5/2020 at 0218 hrs. was interpreted by this MD.  Findings: 

Baseline rhythm is difficult to interpret secondary to amount of artifact at 

baseline.  Rate is 81, axis appears normal, QRS complexes appear narrow, QTC is 

460, there are no obvious patterns of ST segment elevation depression or recipr

ocal changes to suggest acute myocardial ischemia or infarction.  Impression 

indeterminate rhythm with nonspecific ST segments.





Repeat EKG obtained on 10/5/2020 at 0409 hrs. was interpreted by this MD.  

Findings: Normal sinus rhythm, rate 78, [place] interval appears to be within normal 

limits, P waves proceed QRS complexes, QRS complexes appear narrow, QTC is 506, 

there are no obvious patterns of ST segment elevation, depression or reciprocal 

changes seen to suggest acute myocardial ischemia or infarction.  Impression: 

Normal sinus rhythm with nonspecific ST segments.





Discharge





- Discharge


Clinical Impression: 


Back pain


Qualifiers:


 Back pain location: thoracic back pain Chronicity: acute Back pain laterality: 

bilateral Qualified Code(s): M54.6 - Pain in thoracic spine





Condition: Stable


Disposition: HOME, SELF-CARE


Additional Instructions: 


Return to the Emergency Department without delay if any worse.











HOME CARE INSTRUCTIONS & INFORMATION:  Thank you for choosing us for your 

medical needs. We hope you're satisfied with the care you received.  After you 

leave, you must properly care for your problem and, at the same time, observe 

its progress.  Any condition can change.  Some illnesses can change rapidly over

hours or days.  If your condition worsens, return to the Emergency Department or

see your physician promptly.





ABOUT YOUR X-RAYS AND EKG'S:   If you had an EKG or X-rays taken, they have been

read by the Emergency Physician. The X-rays and EKG's will also be read by a 

Radiologist or Cardiologist within 24 hours.  If discrepancies are noted, you 

will be notified by telephone.  Please be certain the ED has a correct telephone

number & address where you can be reached.  Also, realize that some fractures or

abnormalities do not show up on initial X-rays.  If your symptoms continue, see 

your physician.





ABOUT YOUR LABORATORY TEST:   If you had laboratory tests, the results have been

reviewed by the Emergency Physician.  Some test results (for example cultures) 

may not be available for several days.  You will be contacted if any test result

shows you need additional treatment.  Please be certain the ED has a correct 

telephone number and address where you can be reached.





ABOUT YOUR MEDICATIONS:  You will receive instructions on how to take your 

medicine on the prescription label you receive.  Additional information may be 

provided by the Pharmacy.  If you have questions afterwards, call the ED for 

clarification or further instructions.  Some prescribed medications may cause 

drowsiness.  Do not perform tasks such as driving a car or operating machinery 

without consulting your Pharmacist.  If you feel you need a refill of pain 

medication, your condition will need re-evaluation.  Please do not call for a 

refill of any medication.





ABOUT YOUR SIGNATURE:   Signature of this document acknowledges to followin. Understanding that you received emergency treatment and that you may be 

   released before al medical problems are known or treated. Please be certain  

   the ED has a correct phone number & address where you can be reached.


   2. Acknowledgement that you will arrange for follow-up care as recommended.


   3. Authorization for the Emergency Physician to provide information to your 

follow-up Physician in order to maximize your care.





AT ANY TIME, IF YOUR SYMPTOMS CHANGE SIGNIFICANTLY OR WORSEN OR YOU DEVELOP NEW 

SYMPTOMS, RETURN TO THE EMERGENCY DEPARTMENT IMMEDIATELY FOR RE-EVALUATION.





OUR GOAL IS TO PROVIDE EXCELLENT MEDICAL CARE!





WE HOPE THAT WE HAVE MET YOUR EXPECTATIONS DURING YOUR EMERGENCY DEPARTMENT 

VISIT AND THAT YOU FEEL YOU HAVE RECEIVED EXCELLENT CARE!











Referrals: 


MARIAN PORRAS PA-C [Primary Care Provider] - Follow up as needed

## 2020-10-05 NOTE — EKG REPORT
SEVERITY:- ABNORMAL ECG -

SINUS RHYTHM

LOW VOLTAGE IN FRONTAL LEADS

PROLONGED QT INTERVAL

:

Confirmed by: Jessica Guajardo MD 05-Oct-2020 12:47:22

## 2020-10-05 NOTE — RADIOLOGY REPORT (SQ)
EXAM DESCRIPTION: 



XR CHEST 2 VIEWS



COMPLETED DATE/TME:  10/05/2020 00:00



CLINICAL HISTORY: 



85 years, Male, CP



COMPARISON:

11/3/2017 chest



NUMBER OF VIEWS:

2



TECHNIQUE:

2 views of the chest



LIMITATIONS:

None.



FINDINGS:



The heart size is normal. COPD. Osteopenia. Lungs clear. No

pneumothorax. Postsurgical change cervical spine



IMPRESSION:



COPD. Lungs are clear

 



copyright 2011 [employer]- All Rights Reserved

## 2020-10-05 NOTE — EKG REPORT
SEVERITY:- ABNORMAL ECG -

LOW VOLTAGE LIMB LEADS

SINUS RHYTHM

NONSPECIFIC LATERAL  ST-T CHANGES

:

Confirmed by: Collins Morales MD 05-Oct-2020 06:39:55

## 2023-05-05 NOTE — PDOC PROGRESS REPORT
Subjective


Progress Note for:: 11/24/17


Subjective:: 


Her Abad is an 82-year-old male who presented to the emergency department 7 

times complaining of shoulder pain.  During this hospitalization he has grown 

staph epidermidis from 1 blood culture.  He has also had a repeat blood culture 

that is negative.  His urine culture has grown E. coli and Enterococcus 

faecalis followed by enterococcus raffinosis.  Antibiotic therapy has been 

completed.  The patient is awaiting placement to hospice.





Physical Exam


Vital Signs: 


 











Temp Pulse Resp BP Pulse Ox


 


 98.1 F   115 H  20   139/66 H  95 


 


 11/24/17 08:43  11/24/17 08:43  11/24/17 08:43  11/24/17 08:43  11/24/17 08:43








 Intake & Output











 11/23/17 11/24/17 11/25/17





 06:59 06:59 06:59


 


Intake Total 627 1189 


 


Output Total  500 


 


Balance 627 689 


 


Weight 70.3 kg 70.3 kg 











Additional comments: 


The patient is awake.  With assistance from nursing staff he is eating 

breakfast.  He is communicative and tells me that he feels he is wax in his 

ears and would like his ears cleaned out.  Currently, he denies pain.  He does 

not appear to be in any pain.  His lungs are noted to be clear both anteriorly 

and posteriorly.  His cardiac exam is regular without murmurs, gallops or rubs, 

but, his heart rate borders on tachycardia.  His abdomen is soft and flat.  

Bowel sounds are present.  He does not have any guarding rebound noted and 

there are no hernias or masses present.  The lower extremities demonstrate 

trace edema.  The skin shows a stage II decubitus ulcer.  This is per nursing 

staff.  I did not examine this.  Apparently, it is only the size of a quarter.  

Otherwise, the skin is clean dry and intact.








Results


Laboratory Results: 


 





 11/24/17 04:12 





 11/24/17 04:12 





 











  11/24/17 11/24/17





  04:12 04:12


 


WBC  9.6 


 


RBC  3.09 L 


 


Hgb  8.6 L 


 


Hct  25.3 L 


 


MCV  82 


 


MCH  27.8 


 


MCHC  33.9 


 


RDW  17.3 H 


 


Plt Count  203 


 


Seg Neutrophils %  74.9 


 


Lymphocytes %  13.4 


 


Monocytes %  10.6 


 


Eosinophils %  0.8 


 


Basophils %  0.3 


 


Absolute Neutrophils  7.2 


 


Absolute Lymphocytes  1.3 


 


Absolute Monocytes  1.0 


 


Absolute Eosinophils  0.1 


 


Absolute Basophils  0.0 


 


Sodium   139.3


 


Potassium   3.8


 


Chloride   100


 


Carbon Dioxide   26


 


Anion Gap   13


 


BUN   30 H


 


Creatinine   1.78 H


 


Est GFR ( Amer)   45 L


 


Est GFR (Non-Af Amer)   37 L


 


Glucose   102


 


Calcium   8.4


 


Magnesium   1.7











Impressions: 


 





Chest X-Ray  11/03/17 08:59


IMPRESSION:  Minimal bibasilar atelectasis.


 








Shoulder X-Ray  11/03/17 08:59


IMPRESSION:  Osteoporotic.  No acute fracture or malalignment.


 








Chest/Abdomen CTA  11/03/17 12:02


IMPRESSION:  Obstructive lung disease.


Bibasilar atelectasis


No CT angio evidence of acute pulmonary emboli.


 














Assessment & Plan





- Diagnosis


(1) Anemia


Qualifiers: 


   Iron deficiency anemia type: other iron deficiency 


Is this a current diagnosis for this admission?: Yes   





(2) Chest pain


Qualifiers: 


   Chest pain type: unspecified   Qualified Code(s): R07.9 - Chest pain, 

unspecified   


Is this a current diagnosis for this admission?: Yes   





(3) Chronic pain


Is this a current diagnosis for this admission?: Yes   





(4) DNR (do not resuscitate)


Is this a current diagnosis for this admission?: Yes   





(5) Hypotension


Is this a current diagnosis for this admission?: Yes   





(6) Left shoulder pain


Qualifiers: 


   Chronicity: chronic   Qualified Code(s): M25.512 - Pain in left shoulder; 

G89.29 - Other chronic pain; G89.29 - Other chronic pain   


Is this a current diagnosis for this admission?: Yes   





(7) NSTEMI (non-ST elevated myocardial infarction)


Is this a current diagnosis for this admission?: No   





(8) Septicemia


Is this a current diagnosis for this admission?: Yes   





(9) Tachycardia


Is this a current diagnosis for this admission?: Yes   





(10) UTI (urinary tract infection)


Qualifiers: 


   Urinary tract infection type: acute cystitis   Hematuria presence: with 

hematuria   Qualified Code(s): N30.01 - Acute cystitis with hematuria   


Is this a current diagnosis for this admission?: Yes   





(11) Weakness


Is this a current diagnosis for this admission?: Yes   





(12) Pulmonary emboli


Qualifiers: 


   Pulmonary embolism type: other 


Is this a current diagnosis for this admission?: No   








- Plan Summary


Plan Summary: 


The patient's pain appears largely to be musculoskeletal.  His chest pain and 

left shoulder pain appear to be secondary to arthritis.  The patient had 

complications due to sepsis this hospitalization.  His septicemia is secondary 

to his urinary tract infection (bacteria outlined above) and likely his blood 

cultures which are positive for staph epidermidis.  He has completed therapy 

with zyvox.  The patient had a type II non-ST elevation myocardial infarction 

during this hospitalization.  He remains on Xarelto for pulmonary emboli.  

Discharge planning is working on disposition to hospice. We will schedule for an echo to assess LV function and to rule out severe aortic stenosis prior to hip surgery. If the echocardiogram shows no evidence of severe aortic stenosis then he stable to undergo left hip surgery without any further ischemic work-up. Patient able to exercise on a stationary bike for 20 minutes, 4 times a week without any chest pain or dyspnea. Continue Lasix 40 mg 1 every other day. He was advised to follow up with ortho service for left hip pain. Continue current dose of Coreg 12.5 mg p.o. twice daily, amlodipine 10 mg p.o. daily for hypertension  Continue rest of the current medications. Follow-up with me in 3 months.